# Patient Record
Sex: MALE | Race: WHITE | NOT HISPANIC OR LATINO | Employment: OTHER | URBAN - NONMETROPOLITAN AREA
[De-identification: names, ages, dates, MRNs, and addresses within clinical notes are randomized per-mention and may not be internally consistent; named-entity substitution may affect disease eponyms.]

---

## 2017-08-09 ENCOUNTER — APPOINTMENT (OUTPATIENT)
Dept: CT IMAGING | Facility: HOSPITAL | Age: 47
End: 2017-08-09

## 2017-08-09 ENCOUNTER — HOSPITAL ENCOUNTER (EMERGENCY)
Facility: HOSPITAL | Age: 47
Discharge: HOME | End: 2017-08-09
Attending: FAMILY MEDICINE | Admitting: FAMILY MEDICINE

## 2017-08-09 VITALS
SYSTOLIC BLOOD PRESSURE: 127 MMHG | BODY MASS INDEX: 35.44 KG/M2 | HEART RATE: 66 BPM | WEIGHT: 200 LBS | DIASTOLIC BLOOD PRESSURE: 70 MMHG | HEIGHT: 63 IN | OXYGEN SATURATION: 97 % | TEMPERATURE: 98.8 F | RESPIRATION RATE: 16 BRPM

## 2017-08-09 DIAGNOSIS — H81.10 VERTIGO, BENIGN POSITIONAL, UNSPECIFIED LATERALITY: Primary | ICD-10-CM

## 2017-08-09 LAB
ALBUMIN SERPL-MCNC: 4.5 G/DL (ref 3.5–5)
ALBUMIN/GLOB SERPL: 1.3 G/DL (ref 1.1–2.5)
ALP SERPL-CCNC: 103 U/L (ref 24–120)
ALT SERPL W P-5'-P-CCNC: 96 U/L (ref 0–54)
ANION GAP SERPL CALCULATED.3IONS-SCNC: 13 MMOL/L (ref 4–13)
AST SERPL-CCNC: 49 U/L (ref 7–45)
BASOPHILS # BLD AUTO: 0.03 10*3/MM3 (ref 0–0.2)
BASOPHILS NFR BLD AUTO: 0.4 % (ref 0–2)
BILIRUB SERPL-MCNC: 0.8 MG/DL (ref 0.1–1)
BILIRUB UR QL STRIP: NEGATIVE
BUN BLD-MCNC: 15 MG/DL (ref 5–21)
BUN/CREAT SERPL: 22.7 (ref 7–25)
CALCIUM SPEC-SCNC: 9.5 MG/DL (ref 8.4–10.4)
CHLORIDE SERPL-SCNC: 107 MMOL/L (ref 98–110)
CK MB SERPL-CCNC: 0.63 NG/ML (ref 0–5)
CLARITY UR: CLEAR
CO2 SERPL-SCNC: 23 MMOL/L (ref 24–31)
COLOR UR: YELLOW
CREAT BLD-MCNC: 0.66 MG/DL (ref 0.5–1.4)
CRP SERPL-MCNC: 1.54 MG/DL (ref 0–0.99)
DEPRECATED RDW RBC AUTO: 42.6 FL (ref 40–54)
EOSINOPHIL # BLD AUTO: 0.43 10*3/MM3 (ref 0–0.7)
EOSINOPHIL NFR BLD AUTO: 5.6 % (ref 0–4)
ERYTHROCYTE [DISTWIDTH] IN BLOOD BY AUTOMATED COUNT: 13 % (ref 12–15)
GFR SERPL CREATININE-BSD FRML MDRD: 128 ML/MIN/1.73
GLOBULIN UR ELPH-MCNC: 3.4 GM/DL
GLUCOSE BLD-MCNC: 96 MG/DL (ref 70–100)
GLUCOSE UR STRIP-MCNC: NEGATIVE MG/DL
HCT VFR BLD AUTO: 44.8 % (ref 40–52)
HGB BLD-MCNC: 15.2 G/DL (ref 14–18)
HGB UR QL STRIP.AUTO: NEGATIVE
HOLD SPECIMEN: NORMAL
HOLD SPECIMEN: NORMAL
IMM GRANULOCYTES # BLD: 0.02 10*3/MM3 (ref 0–0.03)
IMM GRANULOCYTES NFR BLD: 0.3 % (ref 0–5)
KETONES UR QL STRIP: NEGATIVE
LEUKOCYTE ESTERASE UR QL STRIP.AUTO: NEGATIVE
LYMPHOCYTES # BLD AUTO: 2.36 10*3/MM3 (ref 0.72–4.86)
LYMPHOCYTES NFR BLD AUTO: 30.6 % (ref 15–45)
MCH RBC QN AUTO: 30.6 PG (ref 28–32)
MCHC RBC AUTO-ENTMCNC: 33.9 G/DL (ref 33–36)
MCV RBC AUTO: 90.3 FL (ref 82–95)
MONOCYTES # BLD AUTO: 0.62 10*3/MM3 (ref 0.19–1.3)
MONOCYTES NFR BLD AUTO: 8.1 % (ref 4–12)
MYOGLOBIN SERPL-MCNC: 27.9 NG/ML (ref 0–110)
NEUTROPHILS # BLD AUTO: 4.24 10*3/MM3 (ref 1.87–8.4)
NEUTROPHILS NFR BLD AUTO: 55 % (ref 39–78)
NITRITE UR QL STRIP: NEGATIVE
PH UR STRIP.AUTO: 5.5 [PH] (ref 5–8)
PLATELET # BLD AUTO: 181 10*3/MM3 (ref 130–400)
PMV BLD AUTO: 12.3 FL (ref 6–12)
POTASSIUM BLD-SCNC: 4.3 MMOL/L (ref 3.5–5.3)
PROT SERPL-MCNC: 7.9 G/DL (ref 6.3–8.7)
PROT UR QL STRIP: NEGATIVE
RBC # BLD AUTO: 4.96 10*6/MM3 (ref 4.8–5.9)
SODIUM BLD-SCNC: 143 MMOL/L (ref 135–145)
SP GR UR STRIP: 1.02 (ref 1–1.03)
T4 FREE SERPL-MCNC: 1.23 NG/DL (ref 0.78–2.19)
TROPONIN I SERPL-MCNC: <0.012 NG/ML (ref 0–0.03)
TSH SERPL DL<=0.05 MIU/L-ACNC: 1.86 MIU/ML (ref 0.47–4.68)
UROBILINOGEN UR QL STRIP: NORMAL
WBC NRBC COR # BLD: 7.7 10*3/MM3 (ref 4.8–10.8)
WHOLE BLOOD HOLD SPECIMEN: NORMAL
WHOLE BLOOD HOLD SPECIMEN: NORMAL

## 2017-08-09 PROCEDURE — 36415 COLL VENOUS BLD VENIPUNCTURE: CPT | Performed by: FAMILY MEDICINE

## 2017-08-09 PROCEDURE — 86140 C-REACTIVE PROTEIN: CPT | Performed by: FAMILY MEDICINE

## 2017-08-09 PROCEDURE — 85025 COMPLETE CBC W/AUTO DIFF WBC: CPT | Performed by: FAMILY MEDICINE

## 2017-08-09 PROCEDURE — 81003 URINALYSIS AUTO W/O SCOPE: CPT | Performed by: FAMILY MEDICINE

## 2017-08-09 PROCEDURE — 70450 CT HEAD/BRAIN W/O DYE: CPT

## 2017-08-09 PROCEDURE — 99285 EMERGENCY DEPT VISIT HI MDM: CPT

## 2017-08-09 PROCEDURE — 93005 ELECTROCARDIOGRAM TRACING: CPT | Performed by: FAMILY MEDICINE

## 2017-08-09 PROCEDURE — 93010 ELECTROCARDIOGRAM REPORT: CPT | Performed by: INTERNAL MEDICINE

## 2017-08-09 PROCEDURE — 80053 COMPREHEN METABOLIC PANEL: CPT | Performed by: FAMILY MEDICINE

## 2017-08-09 PROCEDURE — 84484 ASSAY OF TROPONIN QUANT: CPT | Performed by: FAMILY MEDICINE

## 2017-08-09 PROCEDURE — 84443 ASSAY THYROID STIM HORMONE: CPT | Performed by: FAMILY MEDICINE

## 2017-08-09 PROCEDURE — 82553 CREATINE MB FRACTION: CPT | Performed by: FAMILY MEDICINE

## 2017-08-09 PROCEDURE — 84439 ASSAY OF FREE THYROXINE: CPT | Performed by: FAMILY MEDICINE

## 2017-08-09 PROCEDURE — 83874 ASSAY OF MYOGLOBIN: CPT | Performed by: FAMILY MEDICINE

## 2017-08-09 RX ORDER — MECLIZINE HYDROCHLORIDE 25 MG/1
25 TABLET ORAL ONCE
Status: COMPLETED | OUTPATIENT
Start: 2017-08-09 | End: 2017-08-09

## 2017-08-09 RX ORDER — CETIRIZINE HYDROCHLORIDE 10 MG/1
10 TABLET ORAL DAILY
Qty: 15 TABLET | Refills: 0 | Status: SHIPPED | OUTPATIENT
Start: 2017-08-09 | End: 2017-08-24

## 2017-08-09 RX ORDER — MECLIZINE HYDROCHLORIDE 25 MG/1
25 TABLET ORAL 4 TIMES DAILY PRN
Qty: 20 TABLET | Refills: 0 | Status: SHIPPED | OUTPATIENT
Start: 2017-08-09 | End: 2020-08-31

## 2017-08-09 RX ORDER — SODIUM CHLORIDE 9 MG/ML
125 INJECTION, SOLUTION INTRAVENOUS CONTINUOUS
Status: DISCONTINUED | OUTPATIENT
Start: 2017-08-09 | End: 2017-08-09 | Stop reason: HOSPADM

## 2017-08-09 RX ADMIN — MECLIZINE HYDROCHLORIDE 25 MG: 25 TABLET ORAL at 12:02

## 2017-08-09 NOTE — ED PROVIDER NOTES
Subjective   Patient is a 47 y.o. male presenting with dizziness.   Dizziness   Quality:  Vertigo  Severity:  Moderate  Onset quality:  Gradual  Duration:  1 day  Timing:  Intermittent  Progression:  Waxing and waning  Chronicity:  New  Context: head movement    Relieved by:  Being still  Worsened by:  Turning head  Associated symptoms: syncope and weakness    Associated symptoms: no blood in stool, no chest pain, no diarrhea, no headaches, no hearing loss, no nausea, no palpitations, no shortness of breath, no tinnitus, no vision changes and no vomiting    Risk factors: no hx of stroke and no hx of vertigo        Review of Systems   Constitutional: Negative for activity change, appetite change, chills, diaphoresis, fatigue and fever.   HENT: Negative for congestion, ear pain, hearing loss, nosebleeds, postnasal drip, sinus pressure and tinnitus.    Eyes: Negative for photophobia and pain.   Respiratory: Negative for cough, chest tightness, shortness of breath and wheezing.    Cardiovascular: Positive for syncope. Negative for chest pain and palpitations.   Gastrointestinal: Negative for abdominal pain, blood in stool, diarrhea, nausea and vomiting.   Endocrine: Negative for cold intolerance and heat intolerance.   Genitourinary: Negative for difficulty urinating, dysuria and flank pain.   Musculoskeletal: Negative for arthralgias, back pain, neck pain and neck stiffness.   Skin: Negative for color change and rash.   Neurological: Positive for dizziness and weakness. Negative for headaches.   Hematological: Negative for adenopathy. Does not bruise/bleed easily.   Psychiatric/Behavioral: Negative for confusion and sleep disturbance. The patient is not nervous/anxious.        Past Medical History:   Diagnosis Date   • Internal hemorrhage        No Known Allergies    Past Surgical History:   Procedure Laterality Date   • HEMORRHOIDECTOMY     • KNEE SURGERY     • NASAL SEPTUM SURGERY     • ROTATOR CUFF REPAIR    "      History reviewed. No pertinent family history.    Social History     Social History   • Marital status:      Spouse name: N/A   • Number of children: N/A   • Years of education: N/A     Social History Main Topics   • Smoking status: Never Smoker   • Smokeless tobacco: None   • Alcohol use Yes      Comment: OCC, \"ONCE A MONTH\"   • Drug use: No   • Sexual activity: Defer     Other Topics Concern   • None     Social History Narrative   • None           Objective   Physical Exam   Constitutional: He is oriented to person, place, and time. He appears well-developed and well-nourished. No distress.   HENT:   Head: Atraumatic.   Right Ear: External ear normal.   Left Ear: External ear normal.   Nose: Nose normal.   Mouth/Throat: Oropharynx is clear and moist.   Head turning reproduces the vertigo    Eyes: Conjunctivae are normal. Pupils are equal, round, and reactive to light. No scleral icterus.   Neck: Normal range of motion. Neck supple. No JVD present. No thyromegaly present.   Cardiovascular: Normal rate, regular rhythm, normal heart sounds and intact distal pulses.    No murmur heard.  Pulmonary/Chest: Effort normal and breath sounds normal. No respiratory distress. He has no wheezes. He has no rales. He exhibits no tenderness.   Abdominal: Soft. Bowel sounds are normal. He exhibits no distension and no mass. There is no tenderness. There is no rebound and no guarding.   Musculoskeletal: Normal range of motion. He exhibits no edema.   Lymphadenopathy:     He has no cervical adenopathy.   Neurological: He is alert and oriented to person, place, and time. He has normal reflexes. No cranial nerve deficit. Coordination normal.   Skin: Skin is warm and dry. No rash noted. He is not diaphoretic. No erythema. No pallor.   Psychiatric: He has a normal mood and affect. His behavior is normal. Judgment and thought content normal.   Nursing note and vitals reviewed.      Procedures         ED Course  ED Course    "               MDM  Number of Diagnoses or Management Options  Vertigo, benign positional, unspecified laterality: new and requires workup     Amount and/or Complexity of Data Reviewed  Clinical lab tests: ordered and reviewed  Tests in the radiology section of CPT®: ordered and reviewed  Decide to obtain previous medical records or to obtain history from someone other than the patient: yes  Review and summarize past medical records: yes  Independent visualization of images, tracings, or specimens: yes    Risk of Complications, Morbidity, and/or Mortality  Presenting problems: high  Diagnostic procedures: high  Management options: high    Patient Progress  Patient progress: resolved      Final diagnoses:   Vertigo, benign positional, unspecified laterality            Du Oneill MD  08/09/17 4767

## 2020-08-25 ENCOUNTER — OFFICE VISIT (OUTPATIENT)
Dept: GASTROENTEROLOGY | Facility: CLINIC | Age: 50
End: 2020-08-25

## 2020-08-25 VITALS
BODY MASS INDEX: 35.79 KG/M2 | OXYGEN SATURATION: 97 % | SYSTOLIC BLOOD PRESSURE: 116 MMHG | WEIGHT: 202 LBS | HEART RATE: 72 BPM | DIASTOLIC BLOOD PRESSURE: 84 MMHG | HEIGHT: 63 IN | TEMPERATURE: 97.5 F

## 2020-08-25 DIAGNOSIS — K59.00 CONSTIPATION, UNSPECIFIED CONSTIPATION TYPE: ICD-10-CM

## 2020-08-25 DIAGNOSIS — R10.32 LEFT LOWER QUADRANT ABDOMINAL PAIN: ICD-10-CM

## 2020-08-25 DIAGNOSIS — K62.5 BRBPR (BRIGHT RED BLOOD PER RECTUM): Primary | ICD-10-CM

## 2020-08-25 DIAGNOSIS — K21.9 GASTROESOPHAGEAL REFLUX DISEASE, ESOPHAGITIS PRESENCE NOT SPECIFIED: ICD-10-CM

## 2020-08-25 DIAGNOSIS — Z87.19 HISTORY OF BARRETT'S ESOPHAGUS: ICD-10-CM

## 2020-08-25 PROCEDURE — 99204 OFFICE O/P NEW MOD 45 MIN: CPT | Performed by: NURSE PRACTITIONER

## 2020-08-25 NOTE — PROGRESS NOTES
[facility] GASTROENTEROLOGY - OFFICE NOTE    8/25/2020    Epifanio Crandall   1970    Primary Physician: Ivone Trujillo APRN    Chief Complaint   Patient presents with   • GI Bleeding   gerd  Franz's         HISTORY OF PRESENT ILLNESS:    Epifanio Crandall is a 50 y.o. male presents with rectal bleeding.  He reports that once a month he will have an episode where he gets a little weak and dizzy.  This is followed by some left lower quadrant abdominal cramping then passing stool with bright red blood from the rectum.  He states that the blood is in the commode and he is unsure of the amount.  This has been going on intermittently for the last 2-1/2 years.  The left lower quadrant cramping resolves after bowel movement.  Last episode was 2 weeks ago.  He does have some irregular bowel habits manifested by constipation.  His symptoms are not associated with eating.  He does have history of diverticulitis in the past however this is not the same pain.  No syncopal episodes.  Denies unintentional weight loss.  No diarrhea.  No fevers or chills.  No history of vascular disease.  No problems with low blood pressure or heart rate.  He does not smoke.      GERD  Occasional regurgitation at night that occurs about 4 nights per week. Uses tums prn.  He is unsure about taking a PPI in the past.  He admits to history of Franz's esophagus.  He had an upper endoscopy last year by Dr. Vallejo.  He denies dysphagia or odynophagia.  No nausea or vomiting.    He has been seen by .   Had colonoscopy was around 4 months ago.  He admits to having hemorrhoids.  He also reports history of diverticulitis in the past.  States that his last episode was 4 months ago and was treated with antibiotics.  Had egd 2019.         Past Medical History:   Diagnosis Date   • Abnormal serum enzyme level    • Asthma    • Diverticulitis    • Elevated C-reactive protein (CRP)    • Hematuria    • Hyperlipidemia    • Internal hemorrhage    •  Internal hemorrhoids    • Kidney cysts    • Peptic ulcer        Past Surgical History:   Procedure Laterality Date   • HEMORRHOIDECTOMY     • KNEE ARTHROSCOPY     • KNEE SURGERY     • NASAL SEPTUM SURGERY     • RHINOPLASTY     • ROTATOR CUFF REPAIR         Outpatient Medications Marked as Taking for the 8/25/20 encounter (Office Visit) with Rose Norman APRN   Medication Sig Dispense Refill   • ALBUTEROL IN Inhale.         Allergies   Allergen Reactions   • Hydrocodone Itching       Social History     Socioeconomic History   • Marital status:      Spouse name: Not on file   • Number of children: Not on file   • Years of education: Not on file   • Highest education level: Not on file   Tobacco Use   • Smoking status: Never Smoker   • Smokeless tobacco: Never Used   Substance and Sexual Activity   • Alcohol use: Yes     Frequency: Never     Comment: occ   • Drug use: No   • Sexual activity: Defer       Family History   Problem Relation Age of Onset   • Colon cancer Neg Hx        Review of Systems   Constitutional: Negative for appetite change, chills, fatigue, fever and unexpected weight change.   HENT: Negative for sore throat and trouble swallowing.    Eyes: Negative for visual disturbance.   Respiratory: Negative for cough, shortness of breath and wheezing.    Cardiovascular: Negative for chest pain and palpitations.   Gastrointestinal: Positive for blood in stool. Negative for abdominal distention, diarrhea, nausea and vomiting.        As mentioned in hpi   Genitourinary: Negative for difficulty urinating and hematuria.   Musculoskeletal: Negative for arthralgias and back pain.   Skin: Negative for color change and rash.   Neurological: Negative for dizziness, seizures, syncope, light-headedness and headaches.   Hematological: Negative for adenopathy.   Psychiatric/Behavioral: Negative for confusion. The patient is not nervous/anxious.         Vitals:    08/25/20 0842   BP: 116/84   Pulse: 72   Temp:  "97.5 °F (36.4 °C)   SpO2: 97%   Weight: 91.6 kg (202 lb)   Height: 160 cm (63\")      Body mass index is 35.78 kg/m².    Physical Exam   Constitutional: He appears well-developed and well-nourished. No distress.   HENT:   Head: Normocephalic and atraumatic.   Eyes: EOM are normal. No scleral icterus.   Neck: Neck supple. No JVD present.   Cardiovascular: Normal rate, regular rhythm and normal heart sounds.   Pulmonary/Chest: Effort normal and breath sounds normal.   Abdominal: Soft. Bowel sounds are normal. He exhibits no distension. There is no tenderness.   Musculoskeletal: Normal range of motion. He exhibits no deformity.   Neurological: He is alert.   Skin: Skin is warm and dry. No rash noted.   Psychiatric: He has a normal mood and affect. His behavior is normal.   Vitals reviewed.              ASSESSMENT AND PLAN    Assessment/Plan     Epifanoi was seen today for gi bleeding.    Diagnoses and all orders for this visit:    BRBPR (bright red blood per rectum)    Left lower quadrant abdominal pain    Constipation, unspecified constipation type    History of Franz's esophagus    Gastroesophageal reflux disease, esophagitis presence not specified           In regards to rectal bleeding, differential diagnosis discussed.  Recommend ER if has worsening bleeding pain.  I would suggest that we keep his bowels moving on a regular basis.  I have recommended that he continue taking a fiber supplement every day.  I recommend starting on MiraLAX daily and a very low dose and this was discussed in detail.  I will request records from Dr. Vallejo.  Also request recent lab work.  We will see how he does between now and the time of his next office visit.  Again if he were to have worsening symptoms then I would recommend going to the emergency room for evaluation.         In regards to GERD, recommend strict antireflux precautions.  He wishes to try  strict antireflux precautions for taking a PPI. I discussed non pharmaceutical " treatment of gerd.  This includes gradually losing weight to achieve ideal body wt., elevation of the head of bed by 4-6 inches, nothing to eat or drink 3 hours prior to lying down, avoiding tight clothing, stress reduction, tobacco cessation, reduction of alcohol intake, and dietary restrictions (avoiding caffeine, coffee, fatty foods, mints, chocolate, spicy foods and tomato based sauces as much as possible).         We will see him back in the office in 6 weeks time for reevaluation.  Will await records from Dr. Vallejo's to determine recommendations for timing of next egd and colonoscopy.                Body mass index is 35.78 kg/m².    Patient's Body mass index is 35.78 kg/m². BMI is above normal parameters. Recommendations include: recommend weight loss, no f/u .       Return in about 6 weeks (around 10/6/2020).        CATIA Thompson    EMR Dragon/transcription disclaimer:  Much of this encounter note is electronic transcription/translation of spoken language to printed text.  The electronic translation of spoken language may be erroneous, or at times, nonsensical words or phrases may be inadvertently transcribed.  Although I have reviewed the note for such errors, some may still exist.    Received records: 3-4-20 colonoscopy by Dr. Vallejo [place] [facility] [place], noted extensive diverticular disease throughout the colon, normal terminal hemorrhoids, random colonic biopsies performed to rule out microscopic colitis.  Pathology report and random colon biopsies benign.     Last egd 10/2018 noted healing gastric ulcer  ( per office note 2-13-20)   Also noted to have egd 9/2018 noting a gastric ulcer, nodular GE junction, short sliding hiatal hernia, esophagus body torturous, gastric biopsy report benign, GE junction path consistent with Franz's esophagus without dysplasia. He was scheduled for repeat egd 2/2020 but I do not have report. Will request once again last egd with path report.

## 2020-08-31 ENCOUNTER — TELEPHONE (OUTPATIENT)
Dept: GASTROENTEROLOGY | Facility: CLINIC | Age: 50
End: 2020-08-31

## 2020-09-08 ENCOUNTER — TELEPHONE (OUTPATIENT)
Dept: GASTROENTEROLOGY | Facility: CLINIC | Age: 50
End: 2020-09-08

## 2020-10-13 ENCOUNTER — OFFICE VISIT (OUTPATIENT)
Dept: GASTROENTEROLOGY | Facility: CLINIC | Age: 50
End: 2020-10-13

## 2020-10-13 VITALS
HEART RATE: 96 BPM | DIASTOLIC BLOOD PRESSURE: 80 MMHG | SYSTOLIC BLOOD PRESSURE: 124 MMHG | HEIGHT: 63 IN | BODY MASS INDEX: 37.21 KG/M2 | WEIGHT: 210 LBS | TEMPERATURE: 98 F | OXYGEN SATURATION: 98 %

## 2020-10-13 DIAGNOSIS — Z87.19 HISTORY OF BARRETT'S ESOPHAGUS: ICD-10-CM

## 2020-10-13 DIAGNOSIS — R10.32 LEFT LOWER QUADRANT ABDOMINAL PAIN: Primary | ICD-10-CM

## 2020-10-13 DIAGNOSIS — K57.32 DIVERTICULITIS LARGE INTESTINE W/O PERFORATION OR ABSCESS W/O BLEEDING: ICD-10-CM

## 2020-10-13 DIAGNOSIS — K21.9 GASTROESOPHAGEAL REFLUX DISEASE, UNSPECIFIED WHETHER ESOPHAGITIS PRESENT: ICD-10-CM

## 2020-10-13 DIAGNOSIS — K62.5 BRBPR (BRIGHT RED BLOOD PER RECTUM): ICD-10-CM

## 2020-10-13 DIAGNOSIS — K59.00 CONSTIPATION, UNSPECIFIED CONSTIPATION TYPE: ICD-10-CM

## 2020-10-13 PROCEDURE — 99214 OFFICE O/P EST MOD 30 MIN: CPT | Performed by: INTERNAL MEDICINE

## 2020-10-13 NOTE — PROGRESS NOTES
[facility] Gastroenterology    Chief Complaint   Patient presents with   • GI Bleeding   • Diverticulitis       Subjective     HPI    Epifanio Crandall is a 50 y.o. male who presents with a chief complaint of left lower quadrant pain.    His main complaint is left lower quadrant discomfort.  He states over the last 2 years approximately, he has had recurrent episodes of attacks of left lower quadrant discomfort.  They will last 3 to 4 days.  They are associated with abdominal bloating.  He will get acutely constipated and he has fever.  He states he is checked his temperature and has been up to 103 degrees.  He states in the past he just wrote it out.  He never sought medical care.  One time he did go to the ER at an outside facility.  He states he had a CT scan at that time was diagnosed with diverticulitis.  He was given antibiotics and felt better.    He had another attack August 31, a week after his appointment here.  Once again had acute left lower quadrant discomfort associated with fever.  He had a little bit of constipation.  He went to our urgent care.  No studies were ordered.  He states he was prescribed 2 antibiotics.  After taking antibiotics he was back to his normal self.    When he was here for his first time visit in August, we did not have records.  See copy of HPI below.  Rose did see his outside records and noted he had a colonoscopy in March and endoscopy October and September 2018.  See below.  At his office visit in August Rose recommended that she obtain the records which she did.  She also recommended he start a fiber supplement and take MiraLAX for his constipation.  She initiated reflux precautions.  That talked about PPI therapy but shows reflux precautions as mainstay of therapy.    His colonoscopy did show diffuse diverticulosis.  He tells me when he first saw the GI folks in [place], that he had acute left lower quadrant discomfort with fever.  They did not recommend colonoscopy  at that time.  They waited 6 weeks and then he had a colonoscopy which showed a diverticulosis but no diverticulitis.  He states he was asymptomatic at this time.  He has had recurrent attacks of left lower quadrant pain maybe once a month.  He may skip a month.  He has had them twice a month.  He states in the beginning never sought medical care.  He has had 2 family members that had to have colon surgery for recurrent diverticulitis.  He is interested in pursuing colon surgery.    He states he has been prescribed Bentyl by the outside GI group but that did not help his symptoms.  He typically does not have constipation.  He usually gets constipation when he has the attacks associated with fever.    Currently he feels well.  States he does passing bright red blood and he has known hemorrhoids.  These were also noted during his colonoscopy this past spring.    =============== August 25, 2028= PI================================     HISTORY OF PRESENT ILLNESS:     Epifanio Crandall is a 50 y.o. male presents with rectal bleeding.  He reports that once a month he will have an episode where he gets a little weak and dizzy.  This is followed by some left lower quadrant abdominal cramping then passing stool with bright red blood from the rectum.  He states that the blood is in the commode and he is unsure of the amount.  This has been going on intermittently for the last 2-1/2 years.  The left lower quadrant cramping resolves after bowel movement.  Last episode was 2 weeks ago.  He does have some irregular bowel habits manifested by constipation.  His symptoms are not associated with eating.  He does have history of diverticulitis in the past however this is not the same pain.  No syncopal episodes.  Denies unintentional weight loss.  No diarrhea.  No fevers or chills.  No history of vascular disease.  No problems with low blood pressure or heart rate.  He does not smoke.        GERD  Occasional regurgitation at night that  occurs about 4 nights per week. Uses tums prn.  He is unsure about taking a PPI in the past.  He admits to history of Franz's esophagus.  He had an upper endoscopy last year by Dr. Vallejo.  He denies dysphagia or odynophagia.  No nausea or vomiting.     He has been seen by .   Had colonoscopy was around 4 months ago.  He admits to having hemorrhoids.  He also reports history of diverticulitis in the past.  States that his last episode was 4 months ago and was treated with antibiotics.  Had egd 2019.     Received records: 3-4-20 colonoscopy by Dr. Vallejo at [facility], noted extensive diverticular disease throughout the colon, normal terminal hemorrhoids, random colonic biopsies performed to rule out microscopic colitis.  Pathology report and random colon biopsies benign.      Last egd 10/2018 noted healing gastric ulcer  ( per office note 2-13-20)   Also noted to have egd 9/2018 noting a gastric ulcer, nodular GE junction, short sliding hiatal hernia, esophagus body torturous, gastric biopsy report benign, GE junction path consistent with Franz's esophagus without dysplasia. He was scheduled for repeat egd 2/2020 but I do not have report. Will request once again last egd with path report.      Past Medical History:   Diagnosis Date   • Abnormal serum enzyme level    • Asthma    • Diverticulitis    • Elevated C-reactive protein (CRP)    • Hematuria    • Hyperlipidemia    • Internal hemorrhage    • Internal hemorrhoids    • Kidney cysts    • Peptic ulcer        Past Surgical History:   Procedure Laterality Date   • HEMORRHOIDECTOMY     • KNEE ARTHROSCOPY     • KNEE SURGERY     • NASAL SEPTUM SURGERY     • RHINOPLASTY     • ROTATOR CUFF REPAIR           Current Outpatient Medications:   •  albuterol sulfate  (90 Base) MCG/ACT inhaler, Inhale 2 puffs., Disp: , Rfl:   •  albuterol sulfate  (90 Base) MCG/ACT inhaler, INHALE 2 PUFFS BY MOUTH 4 TIMES DAILY AS NEEDED FOR SHORTNESS OF BREATH, Disp: , Rfl:    •  mometasone-formoterol (DULERA 100) 100-5 MCG/ACT inhaler, Inhale 2 puffs 2 (Two) Times a Day., Disp: , Rfl:     Allergies   Allergen Reactions   • Hydrocodone Itching   • Hydrocodone-Acetaminophen Itching     Reaction: Itching         Social History     Socioeconomic History   • Marital status:      Spouse name: Not on file   • Number of children: Not on file   • Years of education: Not on file   • Highest education level: Not on file   Tobacco Use   • Smoking status: Never Smoker   • Smokeless tobacco: Never Used   Substance and Sexual Activity   • Alcohol use: Yes     Frequency: Never     Comment: occ   • Drug use: No   • Sexual activity: Defer       Family History   Problem Relation Age of Onset   • Diverticulitis Maternal Aunt    • Diverticulitis Maternal Aunt    • Diverticulitis Maternal Great-Grandmother    • Colon cancer Neg Hx    • Colon polyps Neg Hx        Review of Systems  General no fever chills or sweats weight stable  Gastrointestinal: Not present-abdominal pain, constipation, diarrhea, dysphagia, hematemesis, melena, odynophagia, nausea, vomiting, pyrosis, regurgitation, hematochezia,    Objective     Vitals:    10/13/20 1418   BP: 124/80   Pulse: 96   Temp: 98 °F (36.7 °C)   SpO2: 98%       Physical Exam  No acute distress. Vital signs as documented. Skin warm and dry and without overt rashes. EOMI, sclera anicteric.  Neck without JVD or masses. Lungs clear to auscultation bilaterally, no rales. Heart exam notable for regular rhythm, normal sounds and absence of loud murmurs, rubs or gallops. Abdomen is soft, nontender, non distended, normal bowel sounds and without evidence of organomegaly, masses, or abdominal aortic enlargement. Extremities nonedematous, no cyanosis. Neuro alert, moves extremities.        Assessment/Plan   Problem List Items Addressed This Visit        Digestive    BRBPR (bright red blood per rectum)    Constipation    Gastroesophageal reflux disease       Nervous and  Auditory    Left lower quadrant abdominal pain - Primary       Other    History of Franz's esophagus      Other Visit Diagnoses     Diverticulitis large intestine w/o perforation or abscess w/o bleeding            Approximately 30 minutes were spent counseling on diverticular disease and reflux disease and Franz's.    He describes with his recurrent left lower quadrant discomfort associated with fever recurrent episodes of diverticulitis.  He generally did not seek medical care and is suffered at home for 3 to 4 days and his symptoms subsided.  He describes one episode where he did go to an ER and he had a CT scan he was diagnosed with diverticulitis he was treated with antibiotics and felt better.  He went to our urgent care, had no test done but was treated with antibiotics and felt better.  He is interested in pursuing long-term preventive care with surgery due to recurrent frequent nature of his attacks.    I did discuss with him that his symptoms are compatible of recurrent diverticulitis.  Unfortunately, I do not have any clinical prove as he did not generally seek medical care except for the one time.  I do not have that CT scan available.  His description is good.  I told him that he has diverticular disease throughout his colon so to have sleep prevent any diverticulitis he would require a subtotal colectomy.  But his symptoms are always located in one particular spot in his left lower quadrant where his prior CT scan that shows diverticulitis he states.  Thus I think it is reasonable for him to discuss surgical options with a surgeon for resection of his sigmoid colon for recurrent diverticulitis.  I did inform him that there are no guarantees and that surgery does have dangers associated with it.  He also understands that there are dangers associated with not seeking medical care when he has acute diverticulitis.  He also understands with acute diverticulitis stirs dangers of worsening infection,  abscess and need for emergent surgeries.  I believe given his clinical history that elective surgery is reasonable.  He is in agreement.  I gave him a name of surgeons here locally.  He has a name of a colorectal specialist in [place].  He told me he is going to think about the option of getting the surgeons opinion and contact us in the near future if he decides to pursue this.  I told him to call our office in the near future and we can make the referral.  In the meantime, I do recommend a high-fiber diet and why.    He also has a history of reflux disease.  He is clinically asymptomatic and reflux precautions.  I reinforced reflux precautions.    He has a history of Franz's esophagus.  I discussed with him our Franz's puts him at increased risk for developing esophageal carcinoma.  We talked about the need for surveillance exams.  I suggest he have a surveillance examination 3 years after his last one.  This would place him needing a surveillance examination approximately this time next year.  He expressed understanding and was in agreement.  I also recommended medical treatment for acid reflux.  We discussed that even without symptoms he could be having reflux which could increase his chance theoretically of progressing to esophageal carcinoma.  May not necessarily be the case but medical therapy with PPI medications may help decrease the odds of progression.  H2 blockers may have some benefit as well but theoretically not as much as PPI therapy.  I recommended again on a PPI therapy.  He politely declines at this time but he will think about it.    We will see him back in the office as needed.  We will also see him in 1 year for checkup.  He will contact us if he wants to get a surgeons opinion about the surgical options for diverticular disease.    Continue ongoing management by primary care provider and other specialists.     Patient's Body mass index is 37.2 kg/m². BMI is above normal parameters.  Recommendations include: nutrition counseling.        EMR Dragon/transcription disclaimer:  Much of this encounter note is electronic transcription/translation of spoken language to printed text.  The electronic translation of spoken language may be erroneous, or at times, nonsensical words or phrases may be inadvertently transcribed.  Although I have reviewed the note for such errors, some may still exist.    Epifanio Sandhu MD  16:52 CDT  10/13/20

## 2020-10-15 ENCOUNTER — TELEPHONE (OUTPATIENT)
Dept: GASTROENTEROLOGY | Facility: CLINIC | Age: 50
End: 2020-10-15

## 2020-10-15 NOTE — TELEPHONE ENCOUNTER
Pt states he would like to be referred to the local surgeon Dr Sandhu mentioned at his last office visit.

## 2020-10-16 DIAGNOSIS — K57.92 DIVERTICULITIS: Primary | ICD-10-CM

## 2020-10-23 ENCOUNTER — TRANSCRIBE ORDERS (OUTPATIENT)
Dept: ADMINISTRATIVE | Facility: HOSPITAL | Age: 50
End: 2020-10-23

## 2020-10-23 DIAGNOSIS — Z11.59 SCREENING FOR VIRAL DISEASE: Primary | ICD-10-CM

## 2020-10-27 ENCOUNTER — LAB (OUTPATIENT)
Dept: LAB | Facility: HOSPITAL | Age: 50
End: 2020-10-27

## 2020-10-27 PROCEDURE — U0003 INFECTIOUS AGENT DETECTION BY NUCLEIC ACID (DNA OR RNA); SEVERE ACUTE RESPIRATORY SYNDROME CORONAVIRUS 2 (SARS-COV-2) (CORONAVIRUS DISEASE [COVID-19]), AMPLIFIED PROBE TECHNIQUE, MAKING USE OF HIGH THROUGHPUT TECHNOLOGIES AS DESCRIBED BY CMS-2020-01-R: HCPCS | Performed by: SPECIALIST

## 2020-10-27 PROCEDURE — C9803 HOPD COVID-19 SPEC COLLECT: HCPCS | Performed by: SPECIALIST

## 2020-10-28 ENCOUNTER — APPOINTMENT (OUTPATIENT)
Dept: PREADMISSION TESTING | Facility: HOSPITAL | Age: 50
End: 2020-10-28

## 2020-10-28 VITALS
HEIGHT: 64 IN | BODY MASS INDEX: 36.43 KG/M2 | OXYGEN SATURATION: 96 % | WEIGHT: 213.41 LBS | SYSTOLIC BLOOD PRESSURE: 129 MMHG | RESPIRATION RATE: 18 BRPM | DIASTOLIC BLOOD PRESSURE: 87 MMHG | HEART RATE: 102 BPM

## 2020-10-28 LAB
ALBUMIN SERPL-MCNC: 4.5 G/DL (ref 3.5–5.2)
ALBUMIN/GLOB SERPL: 1.4 G/DL
ALP SERPL-CCNC: 99 U/L (ref 39–117)
ALT SERPL W P-5'-P-CCNC: 57 U/L (ref 1–41)
ANION GAP SERPL CALCULATED.3IONS-SCNC: 9 MMOL/L (ref 5–15)
AST SERPL-CCNC: 47 U/L (ref 1–40)
BASOPHILS # BLD AUTO: 0.06 10*3/MM3 (ref 0–0.2)
BASOPHILS NFR BLD AUTO: 0.7 % (ref 0–1.5)
BILIRUB SERPL-MCNC: 0.3 MG/DL (ref 0–1.2)
BUN SERPL-MCNC: 18 MG/DL (ref 6–20)
BUN/CREAT SERPL: 24.7 (ref 7–25)
CALCIUM SPEC-SCNC: 9.5 MG/DL (ref 8.6–10.5)
CHLORIDE SERPL-SCNC: 104 MMOL/L (ref 98–107)
CO2 SERPL-SCNC: 26 MMOL/L (ref 22–29)
COVID LABCORP PRIORITY: NORMAL
CREAT SERPL-MCNC: 0.73 MG/DL (ref 0.76–1.27)
DEPRECATED RDW RBC AUTO: 41.1 FL (ref 37–54)
EOSINOPHIL # BLD AUTO: 0.5 10*3/MM3 (ref 0–0.4)
EOSINOPHIL NFR BLD AUTO: 5.5 % (ref 0.3–6.2)
ERYTHROCYTE [DISTWIDTH] IN BLOOD BY AUTOMATED COUNT: 13.2 % (ref 12.3–15.4)
GFR SERPL CREATININE-BSD FRML MDRD: 113 ML/MIN/1.73
GLOBULIN UR ELPH-MCNC: 3.3 GM/DL
GLUCOSE SERPL-MCNC: 149 MG/DL (ref 65–99)
HCT VFR BLD AUTO: 45.4 % (ref 37.5–51)
HGB BLD-MCNC: 15.3 G/DL (ref 13–17.7)
IMM GRANULOCYTES # BLD AUTO: 0.03 10*3/MM3 (ref 0–0.05)
IMM GRANULOCYTES NFR BLD AUTO: 0.3 % (ref 0–0.5)
LYMPHOCYTES # BLD AUTO: 2.23 10*3/MM3 (ref 0.7–3.1)
LYMPHOCYTES NFR BLD AUTO: 24.5 % (ref 19.6–45.3)
MCH RBC QN AUTO: 29.1 PG (ref 26.6–33)
MCHC RBC AUTO-ENTMCNC: 33.7 G/DL (ref 31.5–35.7)
MCV RBC AUTO: 86.5 FL (ref 79–97)
MONOCYTES # BLD AUTO: 0.8 10*3/MM3 (ref 0.1–0.9)
MONOCYTES NFR BLD AUTO: 8.8 % (ref 5–12)
NEUTROPHILS NFR BLD AUTO: 5.48 10*3/MM3 (ref 1.7–7)
NEUTROPHILS NFR BLD AUTO: 60.2 % (ref 42.7–76)
NRBC BLD AUTO-RTO: 0 /100 WBC (ref 0–0.2)
PLATELET # BLD AUTO: 200 10*3/MM3 (ref 140–450)
PMV BLD AUTO: 10.9 FL (ref 6–12)
POTASSIUM SERPL-SCNC: 4.1 MMOL/L (ref 3.5–5.2)
PROT SERPL-MCNC: 7.8 G/DL (ref 6–8.5)
RBC # BLD AUTO: 5.25 10*6/MM3 (ref 4.14–5.8)
SARS-COV-2 RNA RESP QL NAA+PROBE: NOT DETECTED
SODIUM SERPL-SCNC: 139 MMOL/L (ref 136–145)
WBC # BLD AUTO: 9.1 10*3/MM3 (ref 3.4–10.8)

## 2020-10-28 PROCEDURE — 85025 COMPLETE CBC W/AUTO DIFF WBC: CPT | Performed by: SPECIALIST

## 2020-10-28 PROCEDURE — 80053 COMPREHEN METABOLIC PANEL: CPT | Performed by: SPECIALIST

## 2020-10-28 PROCEDURE — 93005 ELECTROCARDIOGRAM TRACING: CPT

## 2020-10-28 PROCEDURE — 93010 ELECTROCARDIOGRAM REPORT: CPT | Performed by: INTERNAL MEDICINE

## 2020-10-28 PROCEDURE — 36415 COLL VENOUS BLD VENIPUNCTURE: CPT

## 2020-10-28 RX ORDER — NEOMYCIN SULFATE 500 MG/1
1000 TABLET ORAL ONCE
COMMUNITY
End: 2020-11-02 | Stop reason: HOSPADM

## 2020-10-28 RX ORDER — CIPROFLOXACIN 500 MG/1
500 TABLET, FILM COATED ORAL DAILY
COMMUNITY
End: 2020-11-02 | Stop reason: HOSPADM

## 2020-10-28 RX ORDER — METRONIDAZOLE 500 MG/1
500 TABLET ORAL 3 TIMES DAILY
COMMUNITY
End: 2020-11-02 | Stop reason: HOSPADM

## 2020-10-28 RX ORDER — ERYTHROMYCIN 500 MG/1
500 TABLET, DELAYED RELEASE ORAL ONCE
COMMUNITY
End: 2020-11-02 | Stop reason: HOSPADM

## 2020-10-29 LAB
QT INTERVAL: 358 MS
QTC INTERVAL: 435 MS

## 2020-10-30 ENCOUNTER — ANESTHESIA (OUTPATIENT)
Dept: PERIOP | Facility: HOSPITAL | Age: 50
End: 2020-10-30

## 2020-10-30 ENCOUNTER — ANESTHESIA EVENT (OUTPATIENT)
Dept: PERIOP | Facility: HOSPITAL | Age: 50
End: 2020-10-30

## 2020-10-30 ENCOUNTER — HOSPITAL ENCOUNTER (INPATIENT)
Facility: HOSPITAL | Age: 50
LOS: 3 days | Discharge: HOME | End: 2020-11-02
Attending: SPECIALIST | Admitting: SPECIALIST

## 2020-10-30 DIAGNOSIS — K57.92 DIVERTICULITIS: ICD-10-CM

## 2020-10-30 LAB
ABO GROUP BLD: NORMAL
BLD GP AB SCN SERPL QL: NEGATIVE
RH BLD: POSITIVE
T&S EXPIRATION DATE: NORMAL

## 2020-10-30 PROCEDURE — 25010000002 PROPOFOL 10 MG/ML EMULSION: Performed by: NURSE ANESTHETIST, CERTIFIED REGISTERED

## 2020-10-30 PROCEDURE — 25010000002 KETOROLAC TROMETHAMINE PER 15 MG: Performed by: NURSE ANESTHETIST, CERTIFIED REGISTERED

## 2020-10-30 PROCEDURE — 86901 BLOOD TYPING SEROLOGIC RH(D): CPT | Performed by: SPECIALIST

## 2020-10-30 PROCEDURE — 25010000002 ONDANSETRON PER 1 MG: Performed by: ANESTHESIOLOGY

## 2020-10-30 PROCEDURE — 86900 BLOOD TYPING SEROLOGIC ABO: CPT | Performed by: SPECIALIST

## 2020-10-30 PROCEDURE — 25010000002 DEXAMETHASONE PER 1 MG: Performed by: NURSE ANESTHETIST, CERTIFIED REGISTERED

## 2020-10-30 PROCEDURE — 94799 UNLISTED PULMONARY SVC/PX: CPT

## 2020-10-30 PROCEDURE — C1765 ADHESION BARRIER: HCPCS | Performed by: SPECIALIST

## 2020-10-30 PROCEDURE — 86850 RBC ANTIBODY SCREEN: CPT | Performed by: SPECIALIST

## 2020-10-30 PROCEDURE — 25010000002 METOCLOPRAMIDE PER 10 MG: Performed by: ANESTHESIOLOGY

## 2020-10-30 PROCEDURE — 25010000002 PHENYLEPHRINE HCL 0.8 MG/10ML SOLUTION PREFILLED SYRINGE: Performed by: NURSE ANESTHETIST, CERTIFIED REGISTERED

## 2020-10-30 PROCEDURE — 25010000002 DEXAMETHASONE PER 1 MG: Performed by: ANESTHESIOLOGY

## 2020-10-30 PROCEDURE — 25010000002 CEFOXITIN PER 1 G: Performed by: SPECIALIST

## 2020-10-30 PROCEDURE — 0DTN4ZZ RESECTION OF SIGMOID COLON, PERCUTANEOUS ENDOSCOPIC APPROACH: ICD-10-PCS | Performed by: SPECIALIST

## 2020-10-30 PROCEDURE — 25010000002 HYDROMORPHONE 1 MG/ML SOLUTION: Performed by: NURSE ANESTHETIST, CERTIFIED REGISTERED

## 2020-10-30 PROCEDURE — 88307 TISSUE EXAM BY PATHOLOGIST: CPT | Performed by: SPECIALIST

## 2020-10-30 PROCEDURE — 25010000002 ONDANSETRON PER 1 MG: Performed by: NURSE ANESTHETIST, CERTIFIED REGISTERED

## 2020-10-30 PROCEDURE — 25010000002 MIDAZOLAM PER 1 MG: Performed by: ANESTHESIOLOGY

## 2020-10-30 RX ORDER — OXYCODONE HYDROCHLORIDE 5 MG/1
7.5 TABLET ORAL EVERY 4 HOURS PRN
Status: DISCONTINUED | OUTPATIENT
Start: 2020-10-30 | End: 2020-11-02 | Stop reason: HOSPADM

## 2020-10-30 RX ORDER — ONDANSETRON 2 MG/ML
4 INJECTION INTRAMUSCULAR; INTRAVENOUS EVERY 6 HOURS PRN
Status: DISCONTINUED | OUTPATIENT
Start: 2020-10-30 | End: 2020-11-02 | Stop reason: HOSPADM

## 2020-10-30 RX ORDER — LIDOCAINE HYDROCHLORIDE 10 MG/ML
0.5 INJECTION, SOLUTION EPIDURAL; INFILTRATION; INTRACAUDAL; PERINEURAL ONCE AS NEEDED
Status: DISCONTINUED | OUTPATIENT
Start: 2020-10-30 | End: 2020-10-30 | Stop reason: HOSPADM

## 2020-10-30 RX ORDER — MORPHINE SULFATE 2 MG/ML
2 INJECTION, SOLUTION INTRAMUSCULAR; INTRAVENOUS
Status: DISCONTINUED | OUTPATIENT
Start: 2020-10-30 | End: 2020-11-02 | Stop reason: HOSPADM

## 2020-10-30 RX ORDER — ACETAMINOPHEN 500 MG
1000 TABLET ORAL ONCE
Status: COMPLETED | OUTPATIENT
Start: 2020-10-30 | End: 2020-10-30

## 2020-10-30 RX ORDER — DEXAMETHASONE SODIUM PHOSPHATE 4 MG/ML
INJECTION, SOLUTION INTRA-ARTICULAR; INTRALESIONAL; INTRAMUSCULAR; INTRAVENOUS; SOFT TISSUE AS NEEDED
Status: DISCONTINUED | OUTPATIENT
Start: 2020-10-30 | End: 2020-10-30 | Stop reason: SURG

## 2020-10-30 RX ORDER — MIDAZOLAM HYDROCHLORIDE 1 MG/ML
1 INJECTION INTRAMUSCULAR; INTRAVENOUS
Status: COMPLETED | OUTPATIENT
Start: 2020-10-30 | End: 2020-10-30

## 2020-10-30 RX ORDER — LABETALOL HYDROCHLORIDE 5 MG/ML
5 INJECTION, SOLUTION INTRAVENOUS
Status: DISCONTINUED | OUTPATIENT
Start: 2020-10-30 | End: 2020-10-30 | Stop reason: HOSPADM

## 2020-10-30 RX ORDER — ONDANSETRON 2 MG/ML
INJECTION INTRAMUSCULAR; INTRAVENOUS AS NEEDED
Status: DISCONTINUED | OUTPATIENT
Start: 2020-10-30 | End: 2020-10-30 | Stop reason: SURG

## 2020-10-30 RX ORDER — ALVIMOPAN 12 MG/1
12 CAPSULE ORAL 2 TIMES DAILY
Status: DISCONTINUED | OUTPATIENT
Start: 2020-10-31 | End: 2020-11-01

## 2020-10-30 RX ORDER — SCOLOPAMINE TRANSDERMAL SYSTEM 1 MG/1
1 PATCH, EXTENDED RELEASE TRANSDERMAL CONTINUOUS
Status: DISCONTINUED | OUTPATIENT
Start: 2020-10-30 | End: 2020-10-30 | Stop reason: HOSPADM

## 2020-10-30 RX ORDER — PROPOFOL 10 MG/ML
VIAL (ML) INTRAVENOUS AS NEEDED
Status: DISCONTINUED | OUTPATIENT
Start: 2020-10-30 | End: 2020-10-30 | Stop reason: SURG

## 2020-10-30 RX ORDER — GABAPENTIN 300 MG/1
300 CAPSULE ORAL 3 TIMES DAILY
Status: COMPLETED | OUTPATIENT
Start: 2020-10-30 | End: 2020-11-02

## 2020-10-30 RX ORDER — SODIUM CHLORIDE, SODIUM LACTATE, POTASSIUM CHLORIDE, CALCIUM CHLORIDE 600; 310; 30; 20 MG/100ML; MG/100ML; MG/100ML; MG/100ML
1000 INJECTION, SOLUTION INTRAVENOUS CONTINUOUS
Status: DISCONTINUED | OUTPATIENT
Start: 2020-10-30 | End: 2020-10-30 | Stop reason: HOSPADM

## 2020-10-30 RX ORDER — SODIUM CHLORIDE, SODIUM LACTATE, POTASSIUM CHLORIDE, CALCIUM CHLORIDE 600; 310; 30; 20 MG/100ML; MG/100ML; MG/100ML; MG/100ML
100 INJECTION, SOLUTION INTRAVENOUS CONTINUOUS
Status: DISCONTINUED | OUTPATIENT
Start: 2020-10-30 | End: 2020-11-01

## 2020-10-30 RX ORDER — NALOXONE HCL 0.4 MG/ML
0.4 VIAL (ML) INJECTION
Status: DISCONTINUED | OUTPATIENT
Start: 2020-10-30 | End: 2020-11-02 | Stop reason: HOSPADM

## 2020-10-30 RX ORDER — NALOXONE HCL 0.4 MG/ML
0.04 VIAL (ML) INJECTION AS NEEDED
Status: DISCONTINUED | OUTPATIENT
Start: 2020-10-30 | End: 2020-10-30 | Stop reason: HOSPADM

## 2020-10-30 RX ORDER — KETAMINE HYDROCHLORIDE 50 MG/ML
INJECTION, SOLUTION, CONCENTRATE INTRAMUSCULAR; INTRAVENOUS AS NEEDED
Status: DISCONTINUED | OUTPATIENT
Start: 2020-10-30 | End: 2020-10-30 | Stop reason: SURG

## 2020-10-30 RX ORDER — GABAPENTIN 300 MG/1
600 CAPSULE ORAL ONCE
Status: COMPLETED | OUTPATIENT
Start: 2020-10-30 | End: 2020-10-30

## 2020-10-30 RX ORDER — SODIUM CHLORIDE 0.9 % (FLUSH) 0.9 %
10 SYRINGE (ML) INJECTION AS NEEDED
Status: DISCONTINUED | OUTPATIENT
Start: 2020-10-30 | End: 2020-10-30 | Stop reason: HOSPADM

## 2020-10-30 RX ORDER — BUPIVACAINE HYDROCHLORIDE 5 MG/ML
INJECTION, SOLUTION PERINEURAL
Status: COMPLETED | OUTPATIENT
Start: 2020-10-30 | End: 2020-10-30

## 2020-10-30 RX ORDER — SCOLOPAMINE TRANSDERMAL SYSTEM 1 MG/1
1 PATCH, EXTENDED RELEASE TRANSDERMAL
Status: DISCONTINUED | OUTPATIENT
Start: 2020-10-30 | End: 2020-10-30 | Stop reason: HOSPADM

## 2020-10-30 RX ORDER — ONDANSETRON 2 MG/ML
4 INJECTION INTRAMUSCULAR; INTRAVENOUS ONCE AS NEEDED
Status: COMPLETED | OUTPATIENT
Start: 2020-10-30 | End: 2020-10-30

## 2020-10-30 RX ORDER — SODIUM CHLORIDE 0.9 % (FLUSH) 0.9 %
3 SYRINGE (ML) INJECTION EVERY 12 HOURS SCHEDULED
Status: DISCONTINUED | OUTPATIENT
Start: 2020-10-30 | End: 2020-10-30 | Stop reason: HOSPADM

## 2020-10-30 RX ORDER — ROCURONIUM BROMIDE 10 MG/ML
INJECTION, SOLUTION INTRAVENOUS AS NEEDED
Status: DISCONTINUED | OUTPATIENT
Start: 2020-10-30 | End: 2020-10-30 | Stop reason: SURG

## 2020-10-30 RX ORDER — CELECOXIB 200 MG/1
200 CAPSULE ORAL DAILY
Status: DISCONTINUED | OUTPATIENT
Start: 2020-10-30 | End: 2020-11-02 | Stop reason: HOSPADM

## 2020-10-30 RX ORDER — METOCLOPRAMIDE HYDROCHLORIDE 5 MG/ML
10 INJECTION INTRAMUSCULAR; INTRAVENOUS ONCE AS NEEDED
Status: COMPLETED | OUTPATIENT
Start: 2020-10-30 | End: 2020-10-30

## 2020-10-30 RX ORDER — FAMOTIDINE 20 MG/1
20 TABLET, FILM COATED ORAL 2 TIMES DAILY
Status: DISCONTINUED | OUTPATIENT
Start: 2020-10-30 | End: 2020-11-02 | Stop reason: HOSPADM

## 2020-10-30 RX ORDER — ACETAMINOPHEN 500 MG
1000 TABLET ORAL EVERY 6 HOURS
Status: DISPENSED | OUTPATIENT
Start: 2020-10-30 | End: 2020-11-01

## 2020-10-30 RX ORDER — DEXAMETHASONE SODIUM PHOSPHATE 4 MG/ML
4 INJECTION, SOLUTION INTRA-ARTICULAR; INTRALESIONAL; INTRAMUSCULAR; INTRAVENOUS; SOFT TISSUE ONCE AS NEEDED
Status: COMPLETED | OUTPATIENT
Start: 2020-10-30 | End: 2020-10-30

## 2020-10-30 RX ORDER — ALVIMOPAN 12 MG/1
12 CAPSULE ORAL ONCE
Status: COMPLETED | OUTPATIENT
Start: 2020-10-30 | End: 2020-10-30

## 2020-10-30 RX ORDER — PHENYLEPHRINE HCL IN 0.9% NACL 0.8MG/10ML
SYRINGE (ML) INTRAVENOUS AS NEEDED
Status: DISCONTINUED | OUTPATIENT
Start: 2020-10-30 | End: 2020-10-30 | Stop reason: SURG

## 2020-10-30 RX ORDER — SODIUM CHLORIDE 0.9 % (FLUSH) 0.9 %
3 SYRINGE (ML) INJECTION AS NEEDED
Status: DISCONTINUED | OUTPATIENT
Start: 2020-10-30 | End: 2020-10-30 | Stop reason: HOSPADM

## 2020-10-30 RX ORDER — DEXTROSE, SODIUM CHLORIDE, AND POTASSIUM CHLORIDE 5; .45; .15 G/100ML; G/100ML; G/100ML
100 INJECTION INTRAVENOUS CONTINUOUS
Status: DISCONTINUED | OUTPATIENT
Start: 2020-10-30 | End: 2020-11-01

## 2020-10-30 RX ORDER — FLUMAZENIL 0.1 MG/ML
0.2 INJECTION INTRAVENOUS AS NEEDED
Status: DISCONTINUED | OUTPATIENT
Start: 2020-10-30 | End: 2020-10-30 | Stop reason: HOSPADM

## 2020-10-30 RX ORDER — FAMOTIDINE 10 MG/ML
20 INJECTION, SOLUTION INTRAVENOUS
Status: COMPLETED | OUTPATIENT
Start: 2020-10-30 | End: 2020-10-30

## 2020-10-30 RX ORDER — SODIUM CHLORIDE 0.9 % (FLUSH) 0.9 %
3-10 SYRINGE (ML) INJECTION AS NEEDED
Status: DISCONTINUED | OUTPATIENT
Start: 2020-10-30 | End: 2020-10-30 | Stop reason: HOSPADM

## 2020-10-30 RX ORDER — MAGNESIUM HYDROXIDE 1200 MG/15ML
LIQUID ORAL AS NEEDED
Status: DISCONTINUED | OUTPATIENT
Start: 2020-10-30 | End: 2020-10-30 | Stop reason: HOSPADM

## 2020-10-30 RX ORDER — FENTANYL CITRATE 50 UG/ML
25 INJECTION, SOLUTION INTRAMUSCULAR; INTRAVENOUS
Status: DISCONTINUED | OUTPATIENT
Start: 2020-10-30 | End: 2020-10-30 | Stop reason: HOSPADM

## 2020-10-30 RX ORDER — SODIUM CHLORIDE 9 MG/ML
INJECTION, SOLUTION INTRAVENOUS AS NEEDED
Status: DISCONTINUED | OUTPATIENT
Start: 2020-10-30 | End: 2020-10-30 | Stop reason: HOSPADM

## 2020-10-30 RX ORDER — KETOROLAC TROMETHAMINE 30 MG/ML
INJECTION, SOLUTION INTRAMUSCULAR; INTRAVENOUS AS NEEDED
Status: DISCONTINUED | OUTPATIENT
Start: 2020-10-30 | End: 2020-10-30 | Stop reason: SURG

## 2020-10-30 RX ORDER — ONDANSETRON 2 MG/ML
4 INJECTION INTRAMUSCULAR; INTRAVENOUS AS NEEDED
Status: DISCONTINUED | OUTPATIENT
Start: 2020-10-30 | End: 2020-10-30 | Stop reason: HOSPADM

## 2020-10-30 RX ORDER — CELECOXIB 200 MG/1
200 CAPSULE ORAL ONCE
Status: COMPLETED | OUTPATIENT
Start: 2020-10-30 | End: 2020-10-30

## 2020-10-30 RX ADMIN — GABAPENTIN 600 MG: 300 CAPSULE ORAL at 06:58

## 2020-10-30 RX ADMIN — KETAMINE HYDROCHLORIDE 25 MG: 50 INJECTION, SOLUTION INTRAMUSCULAR; INTRAVENOUS at 10:35

## 2020-10-30 RX ADMIN — ROCURONIUM BROMIDE 25 MG: 10 INJECTION INTRAVENOUS at 08:12

## 2020-10-30 RX ADMIN — CEFOXITIN SODIUM 1 G: 1 POWDER, FOR SOLUTION INTRAVENOUS at 10:00

## 2020-10-30 RX ADMIN — ROCURONIUM BROMIDE 25 MG: 10 INJECTION INTRAVENOUS at 07:37

## 2020-10-30 RX ADMIN — KETAMINE HYDROCHLORIDE 25 MG: 50 INJECTION, SOLUTION INTRAMUSCULAR; INTRAVENOUS at 08:19

## 2020-10-30 RX ADMIN — FAMOTIDINE 20 MG: 10 INJECTION INTRAVENOUS at 06:44

## 2020-10-30 RX ADMIN — ACETAMINOPHEN 1000 MG: 500 TABLET, FILM COATED ORAL at 20:59

## 2020-10-30 RX ADMIN — HYDROMORPHONE HYDROCHLORIDE 100 MCG: 1 INJECTION, SOLUTION INTRAMUSCULAR; INTRAVENOUS; SUBCUTANEOUS at 07:29

## 2020-10-30 RX ADMIN — ONDANSETRON HYDROCHLORIDE 4 MG: 2 SOLUTION INTRAMUSCULAR; INTRAVENOUS at 10:30

## 2020-10-30 RX ADMIN — HYDROMORPHONE HYDROCHLORIDE 900 MCG: 1 INJECTION, SOLUTION INTRAMUSCULAR; INTRAVENOUS; SUBCUTANEOUS at 08:12

## 2020-10-30 RX ADMIN — BUPIVACAINE HYDROCHLORIDE 2.5 ML: 5 INJECTION, SOLUTION PERINEURAL at 07:29

## 2020-10-30 RX ADMIN — KETAMINE HYDROCHLORIDE 25 MG: 50 INJECTION, SOLUTION INTRAMUSCULAR; INTRAVENOUS at 09:48

## 2020-10-30 RX ADMIN — ONDANSETRON HYDROCHLORIDE 4 MG: 2 SOLUTION INTRAMUSCULAR; INTRAVENOUS at 06:44

## 2020-10-30 RX ADMIN — GABAPENTIN 300 MG: 300 CAPSULE ORAL at 16:19

## 2020-10-30 RX ADMIN — PROPOFOL 150 MG: 10 INJECTION, EMULSION INTRAVENOUS at 07:37

## 2020-10-30 RX ADMIN — DEXAMETHASONE SODIUM PHOSPHATE 4 MG: 4 INJECTION, SOLUTION INTRAMUSCULAR; INTRAVENOUS at 10:30

## 2020-10-30 RX ADMIN — POTASSIUM CHLORIDE, DEXTROSE MONOHYDRATE AND SODIUM CHLORIDE 100 ML/HR: 150; 5; 450 INJECTION, SOLUTION INTRAVENOUS at 14:58

## 2020-10-30 RX ADMIN — DEXAMETHASONE SODIUM PHOSPHATE 4 MG: 4 INJECTION, SOLUTION INTRAMUSCULAR; INTRAVENOUS at 06:44

## 2020-10-30 RX ADMIN — FAMOTIDINE 20 MG: 20 TABLET, FILM COATED ORAL at 20:53

## 2020-10-30 RX ADMIN — SODIUM CHLORIDE, POTASSIUM CHLORIDE, SODIUM LACTATE AND CALCIUM CHLORIDE: 600; 310; 30; 20 INJECTION, SOLUTION INTRAVENOUS at 08:03

## 2020-10-30 RX ADMIN — GABAPENTIN 300 MG: 300 CAPSULE ORAL at 20:53

## 2020-10-30 RX ADMIN — MIDAZOLAM HYDROCHLORIDE 1 MG: 2 INJECTION, SOLUTION INTRAMUSCULAR; INTRAVENOUS at 06:44

## 2020-10-30 RX ADMIN — CELECOXIB 200 MG: 200 CAPSULE ORAL at 07:02

## 2020-10-30 RX ADMIN — Medication 80 MCG: at 07:49

## 2020-10-30 RX ADMIN — Medication 80 MCG: at 10:07

## 2020-10-30 RX ADMIN — SODIUM CHLORIDE, POTASSIUM CHLORIDE, SODIUM LACTATE AND CALCIUM CHLORIDE 1000 ML: 600; 310; 30; 20 INJECTION, SOLUTION INTRAVENOUS at 06:20

## 2020-10-30 RX ADMIN — SCOPALAMINE 1 PATCH: 1 PATCH, EXTENDED RELEASE TRANSDERMAL at 06:44

## 2020-10-30 RX ADMIN — KETAMINE HYDROCHLORIDE 25 MG: 50 INJECTION, SOLUTION INTRAMUSCULAR; INTRAVENOUS at 07:35

## 2020-10-30 RX ADMIN — ACETAMINOPHEN 1000 MG: 500 TABLET, FILM COATED ORAL at 06:58

## 2020-10-30 RX ADMIN — Medication 80 MCG: at 10:19

## 2020-10-30 RX ADMIN — Medication 120 MCG: at 08:04

## 2020-10-30 RX ADMIN — MIDAZOLAM HYDROCHLORIDE 1 MG: 2 INJECTION, SOLUTION INTRAMUSCULAR; INTRAVENOUS at 07:18

## 2020-10-30 RX ADMIN — Medication 80 MCG: at 07:54

## 2020-10-30 RX ADMIN — Medication 80 MCG: at 09:55

## 2020-10-30 RX ADMIN — Medication 80 MCG: at 07:57

## 2020-10-30 RX ADMIN — KETOROLAC TROMETHAMINE 15 MG: 30 INJECTION, SOLUTION INTRAMUSCULAR at 10:30

## 2020-10-30 RX ADMIN — SODIUM CHLORIDE, POTASSIUM CHLORIDE, SODIUM LACTATE AND CALCIUM CHLORIDE: 600; 310; 30; 20 INJECTION, SOLUTION INTRAVENOUS at 10:23

## 2020-10-30 RX ADMIN — METOCLOPRAMIDE 10 MG: 5 INJECTION, SOLUTION INTRAMUSCULAR; INTRAVENOUS at 06:44

## 2020-10-30 RX ADMIN — ALVIMOPAN 12 MG: 12 CAPSULE ORAL at 06:58

## 2020-10-30 RX ADMIN — CEFOXITIN SODIUM 2 G: 1 POWDER, FOR SOLUTION INTRAVENOUS at 07:39

## 2020-10-30 NOTE — ANESTHESIA PREPROCEDURE EVALUATION
Anesthesia Evaluation     history of anesthetic complications: PONV  NPO Solid Status: > 8 hours  NPO Liquid Status: > 8 hours           Airway   Mallampati: II  TM distance: >3 FB  Neck ROM: full  No difficulty expected  Dental      Pulmonary    (+) asthma (uses inhaler daily),  (-) sleep apnea, not a smoker  Cardiovascular   Exercise tolerance: good (4-7 METS)    ECG reviewed    (+) hyperlipidemia,   (-) CAD      Neuro/Psych  (-) seizures, TIA, CVA  GI/Hepatic/Renal/Endo    (+)  GERD, PUD,  renal disease (cyst),     ROS Comment: diverticulosis    Musculoskeletal     Abdominal    Substance History      OB/GYN          Other                        Anesthesia Plan    ASA 2     general and ITN     intravenous induction     Anesthetic plan, all risks, benefits, and alternatives have been provided, discussed and informed consent has been obtained with: patient.

## 2020-10-30 NOTE — ANESTHESIA PROCEDURE NOTES
Spinal Block      Patient location during procedure: OB  Indication:at surgeon's request  Performed By  CRNA: Chetan Somers CRNA  Preanesthetic Checklist  Completed: patient identified, site marked, surgical consent, pre-op evaluation, timeout performed, IV checked, risks and benefits discussed and monitors and equipment checked  Spinal Block Prep:  Patient Position:sitting  Sterile Tech:cap, gloves, mask and sterile barriers  Prep:Chloraprep  Patient Monitoring:blood pressure monitoring, continuous pulse oximetry and EKG  Spinal Block Procedure  Approach:midline  Guidance:landmark technique  Location:L4-L5  Needle Type:Pencan  Needle Gauge:25 G  Placement of Spinal needle event:cerebrospinal fluid aspirated  Paresthesia: no  Fluid Appearance:clear  Medications: bupivacaine (MARCAINE) injection 0.5%, 2.5 mL  Med Administered at 10/30/2020 7:29 AM   Post Assessment  Patient Tolerance:patient tolerated the procedure well with no apparent complications  Complications no

## 2020-10-30 NOTE — ANESTHESIA PROCEDURE NOTES
Airway  Urgency: elective    Date/Time: 10/30/2020 7:41 AM  Airway not difficult    General Information and Staff    Patient location during procedure: OR  CRNA: Chetan Somers CRNA    Indications and Patient Condition  Indications for airway management: airway protection    Preoxygenated: yes  MILS maintained throughout  Mask difficulty assessment: 1 - vent by mask    Final Airway Details  Final airway type: endotracheal airway      Successful airway: ETT  Cuffed: yes   Successful intubation technique: direct laryngoscopy  Endotracheal tube insertion site: oral  Blade: Woodruff  Blade size: 2  ETT size (mm): 7.5  Cormack-Lehane Classification: grade IIa - partial view of glottis  Placement verified by: chest auscultation and capnometry   Cuff volume (mL): 5  Measured from: gums  ETT/EBT to gums (cm): 21  Number of attempts at approach: 1  Assessment: lips, teeth, and gum same as pre-op and atraumatic intubation

## 2020-10-30 NOTE — ANESTHESIA POSTPROCEDURE EVALUATION
Patient: Epifanio Crandall    Procedure Summary     Date: 10/30/20 Room / Location:  [facility] OR  /  [facility] OR    Anesthesia Start: 0722 Anesthesia Stop: 1114    Procedure: LAPAROSCOPIC ASSISTED SIGMOID COLECTOMY AND LAPAROSCOPIC TAKEDOWN OF SPLEENIC FLEXURE (N/A Abdomen) Diagnosis: (DIVERTICULITIS)    Surgeon: Gretchen Martinez MD Provider: Chetan Somers CRNA    Anesthesia Type: general, ITN ASA Status: 2          Anesthesia Type: general, ITN    Vitals  Vitals Value Taken Time   /75 10/30/20 1300   Temp 97.8 °F (36.6 °C) 10/30/20 1245   Pulse 96 10/30/20 1300   Resp 14 10/30/20 1300   SpO2 96 % 10/30/20 1300           Post Anesthesia Care and Evaluation    Patient location during evaluation: PACU  Patient participation: complete - patient participated  Level of consciousness: awake and alert  Pain management: adequate  Airway patency: patent  Anesthetic complications: No anesthetic complications    Cardiovascular status: acceptable  Respiratory status: acceptable  Hydration status: acceptable    Comments: Blood pressure 138/75, pulse 96, temperature 97.8 °F (36.6 °C), temperature source Temporal, resp. rate 14, SpO2 96 %.    Pt discharged from PACU based on karrie score >8

## 2020-10-31 LAB
ANION GAP SERPL CALCULATED.3IONS-SCNC: 6 MMOL/L (ref 5–15)
BASOPHILS # BLD AUTO: 0.01 10*3/MM3 (ref 0–0.2)
BASOPHILS NFR BLD AUTO: 0.1 % (ref 0–1.5)
BUN SERPL-MCNC: 10 MG/DL (ref 6–20)
BUN/CREAT SERPL: 14.3 (ref 7–25)
CALCIUM SPEC-SCNC: 8.5 MG/DL (ref 8.6–10.5)
CHLORIDE SERPL-SCNC: 104 MMOL/L (ref 98–107)
CO2 SERPL-SCNC: 25 MMOL/L (ref 22–29)
CREAT SERPL-MCNC: 0.7 MG/DL (ref 0.76–1.27)
DEPRECATED RDW RBC AUTO: 42.6 FL (ref 37–54)
EOSINOPHIL # BLD AUTO: 0 10*3/MM3 (ref 0–0.4)
EOSINOPHIL NFR BLD AUTO: 0 % (ref 0.3–6.2)
ERYTHROCYTE [DISTWIDTH] IN BLOOD BY AUTOMATED COUNT: 13.2 % (ref 12.3–15.4)
GFR SERPL CREATININE-BSD FRML MDRD: 118 ML/MIN/1.73
GLUCOSE SERPL-MCNC: 211 MG/DL (ref 65–99)
HCT VFR BLD AUTO: 37.9 % (ref 37.5–51)
HGB BLD-MCNC: 12.6 G/DL (ref 13–17.7)
IMM GRANULOCYTES # BLD AUTO: 0.07 10*3/MM3 (ref 0–0.05)
IMM GRANULOCYTES NFR BLD AUTO: 0.5 % (ref 0–0.5)
LYMPHOCYTES # BLD AUTO: 1.28 10*3/MM3 (ref 0.7–3.1)
LYMPHOCYTES NFR BLD AUTO: 8.4 % (ref 19.6–45.3)
MCH RBC QN AUTO: 29.4 PG (ref 26.6–33)
MCHC RBC AUTO-ENTMCNC: 33.2 G/DL (ref 31.5–35.7)
MCV RBC AUTO: 88.3 FL (ref 79–97)
MONOCYTES # BLD AUTO: 1.31 10*3/MM3 (ref 0.1–0.9)
MONOCYTES NFR BLD AUTO: 8.6 % (ref 5–12)
NEUTROPHILS NFR BLD AUTO: 12.62 10*3/MM3 (ref 1.7–7)
NEUTROPHILS NFR BLD AUTO: 82.4 % (ref 42.7–76)
NRBC BLD AUTO-RTO: 0 /100 WBC (ref 0–0.2)
PLATELET # BLD AUTO: 181 10*3/MM3 (ref 140–450)
PMV BLD AUTO: 11.1 FL (ref 6–12)
POTASSIUM SERPL-SCNC: 4.9 MMOL/L (ref 3.5–5.2)
RBC # BLD AUTO: 4.29 10*6/MM3 (ref 4.14–5.8)
SODIUM SERPL-SCNC: 135 MMOL/L (ref 136–145)
WBC # BLD AUTO: 15.29 10*3/MM3 (ref 3.4–10.8)

## 2020-10-31 PROCEDURE — 80048 BASIC METABOLIC PNL TOTAL CA: CPT | Performed by: SPECIALIST

## 2020-10-31 PROCEDURE — 25010000002 ENOXAPARIN PER 10 MG: Performed by: SPECIALIST

## 2020-10-31 PROCEDURE — 85025 COMPLETE CBC W/AUTO DIFF WBC: CPT | Performed by: SPECIALIST

## 2020-10-31 RX ADMIN — GABAPENTIN 300 MG: 300 CAPSULE ORAL at 17:51

## 2020-10-31 RX ADMIN — FAMOTIDINE 20 MG: 20 TABLET, FILM COATED ORAL at 20:00

## 2020-10-31 RX ADMIN — ACETAMINOPHEN 1000 MG: 500 TABLET, FILM COATED ORAL at 19:53

## 2020-10-31 RX ADMIN — ACETAMINOPHEN 1000 MG: 500 TABLET, FILM COATED ORAL at 08:42

## 2020-10-31 RX ADMIN — FAMOTIDINE 20 MG: 20 TABLET, FILM COATED ORAL at 08:42

## 2020-10-31 RX ADMIN — ENOXAPARIN SODIUM 40 MG: 40 INJECTION SUBCUTANEOUS at 08:42

## 2020-10-31 RX ADMIN — OXYCODONE HYDROCHLORIDE 7.5 MG: 5 TABLET ORAL at 19:53

## 2020-10-31 RX ADMIN — ACETAMINOPHEN 1000 MG: 500 TABLET, FILM COATED ORAL at 13:16

## 2020-10-31 RX ADMIN — ALVIMOPAN 12 MG: 12 CAPSULE ORAL at 08:42

## 2020-10-31 RX ADMIN — OXYCODONE HYDROCHLORIDE 7.5 MG: 5 TABLET ORAL at 13:03

## 2020-10-31 RX ADMIN — GABAPENTIN 300 MG: 300 CAPSULE ORAL at 20:00

## 2020-10-31 RX ADMIN — POTASSIUM CHLORIDE, DEXTROSE MONOHYDRATE AND SODIUM CHLORIDE 100 ML/HR: 150; 5; 450 INJECTION, SOLUTION INTRAVENOUS at 00:59

## 2020-10-31 RX ADMIN — ALVIMOPAN 12 MG: 12 CAPSULE ORAL at 20:00

## 2020-10-31 RX ADMIN — POTASSIUM CHLORIDE, DEXTROSE MONOHYDRATE AND SODIUM CHLORIDE 100 ML/HR: 150; 5; 450 INJECTION, SOLUTION INTRAVENOUS at 13:09

## 2020-10-31 RX ADMIN — OXYCODONE HYDROCHLORIDE 7.5 MG: 5 TABLET ORAL at 01:15

## 2020-10-31 RX ADMIN — GABAPENTIN 300 MG: 300 CAPSULE ORAL at 08:42

## 2020-10-31 RX ADMIN — CELECOXIB 200 MG: 200 CAPSULE ORAL at 08:42

## 2020-11-01 PROCEDURE — 25010000002 ENOXAPARIN PER 10 MG: Performed by: SPECIALIST

## 2020-11-01 RX ADMIN — ENOXAPARIN SODIUM 40 MG: 40 INJECTION SUBCUTANEOUS at 08:01

## 2020-11-01 RX ADMIN — OXYCODONE HYDROCHLORIDE 7.5 MG: 5 TABLET ORAL at 02:09

## 2020-11-01 RX ADMIN — ACETAMINOPHEN 1000 MG: 500 TABLET, FILM COATED ORAL at 02:09

## 2020-11-01 RX ADMIN — POTASSIUM CHLORIDE, DEXTROSE MONOHYDRATE AND SODIUM CHLORIDE 100 ML/HR: 150; 5; 450 INJECTION, SOLUTION INTRAVENOUS at 07:59

## 2020-11-01 RX ADMIN — FAMOTIDINE 20 MG: 20 TABLET, FILM COATED ORAL at 21:20

## 2020-11-01 RX ADMIN — OXYCODONE HYDROCHLORIDE 7.5 MG: 5 TABLET ORAL at 07:46

## 2020-11-01 RX ADMIN — FAMOTIDINE 20 MG: 20 TABLET, FILM COATED ORAL at 08:01

## 2020-11-01 RX ADMIN — OXYCODONE HYDROCHLORIDE 7.5 MG: 5 TABLET ORAL at 18:26

## 2020-11-01 RX ADMIN — GABAPENTIN 300 MG: 300 CAPSULE ORAL at 21:20

## 2020-11-01 RX ADMIN — CELECOXIB 200 MG: 200 CAPSULE ORAL at 08:01

## 2020-11-01 RX ADMIN — GABAPENTIN 300 MG: 300 CAPSULE ORAL at 08:01

## 2020-11-01 RX ADMIN — ALVIMOPAN 12 MG: 12 CAPSULE ORAL at 08:01

## 2020-11-01 RX ADMIN — GABAPENTIN 300 MG: 300 CAPSULE ORAL at 16:34

## 2020-11-01 RX ADMIN — OXYCODONE HYDROCHLORIDE 7.5 MG: 5 TABLET ORAL at 23:07

## 2020-11-01 RX ADMIN — OXYCODONE HYDROCHLORIDE 7.5 MG: 5 TABLET ORAL at 13:00

## 2020-11-01 NOTE — PLAN OF CARE
"  Pt had showed nursing staff a couple of bowel movements that were \"grainy\" and \"blood tinged\" in appearance. Pt otherwise reported pain control with two prn PO medications and appeared to sleep most of the night.     Problem: Adult Inpatient Plan of Care  Goal: Plan of Care Review  Outcome: Ongoing, Progressing  Goal: Patient-Specific Goal (Individualized)  Outcome: Ongoing, Progressing  Goal: Absence of Hospital-Acquired Illness or Injury  Outcome: Ongoing, Progressing  Intervention: Identify and Manage Fall Risk  Recent Flowsheet Documentation  Taken 11/1/2020 0239 by Jose Velasquez RN  Safety Promotion/Fall Prevention: safety round/check completed  Taken 11/1/2020 0215 by Jose Velasquez RN  Safety Promotion/Fall Prevention: safety round/check completed  Taken 11/1/2020 0015 by Jose Velasquez RN  Safety Promotion/Fall Prevention: safety round/check completed  Taken 10/31/2020 2229 by Jose Velasquez RN  Safety Promotion/Fall Prevention: safety round/check completed  Taken 10/31/2020 2000 by Jose Velasquez RN  Safety Promotion/Fall Prevention: safety round/check completed  Taken 10/31/2020 1953 by Jose Velasquez RN  Safety Promotion/Fall Prevention: safety round/check completed  Goal: Optimal Comfort and Wellbeing  Outcome: Ongoing, Progressing  Goal: Readiness for Transition of Care  Outcome: Ongoing, Progressing     Problem: Bleeding (Surgery Nonspecified)  Goal: Absence of Bleeding  Outcome: Ongoing, Progressing     Problem: Bowel Elimination Impaired (Surgery Nonspecified)  Goal: Effective Bowel Elimination  Outcome: Ongoing, Progressing     Problem: Infection (Surgery Nonspecified)  Goal: Absence of Infection Signs and Symptoms  Outcome: Ongoing, Progressing     Problem: Ongoing Anesthesia Effects (Surgery Nonspecified)  Goal: Anesthesia/Sedation Recovery  Outcome: Ongoing, Progressing  Intervention: Optimize Anesthesia Recovery  Recent Flowsheet Documentation  Taken 11/1/2020 0239 by " Velasquez, Scytha T, RN  Safety Promotion/Fall Prevention: safety round/check completed  Taken 11/1/2020 0215 by Jose Velasquez RN  Safety Promotion/Fall Prevention: safety round/check completed  Taken 11/1/2020 0015 by Jose Velasquez RN  Safety Promotion/Fall Prevention: safety round/check completed  Taken 10/31/2020 2229 by Jose Velasquez RN  Safety Promotion/Fall Prevention: safety round/check completed  Taken 10/31/2020 2000 by Jose Velasquez RN  Safety Promotion/Fall Prevention: safety round/check completed  Taken 10/31/2020 1953 by Jose Velasquez RN  Safety Promotion/Fall Prevention: safety round/check completed     Problem: Pain (Surgery Nonspecified)  Goal: Acceptable Pain Control  Outcome: Ongoing, Progressing     Problem: Postoperative Nausea and Vomiting (Surgery Nonspecified)  Goal: Nausea and Vomiting Relief  Outcome: Ongoing, Progressing     Problem: Postoperative Urinary Retention (Surgery Nonspecified)  Goal: Effective Urinary Elimination  Outcome: Ongoing, Progressing   Goal Outcome Evaluation:  Plan of Care Reviewed With: patient  Progress: improving

## 2020-11-01 NOTE — PLAN OF CARE
Goal Outcome Evaluation:  Plan of Care Reviewed With: patient  Progress: improving  Outcome Summary: Pt doing well. Large BM today. Ambulating. Tolerating fulls. IV infiltrated- D/C. No IV. Will cont to monitor

## 2020-11-01 NOTE — PROGRESS NOTES
Gretchen Martinez MD Progress Note     LOS: 2 days   Patient Care Team:  Ivone Trujillo APRN as PCP - General (Family Medicine)  Epifanio Sandhu MD as Consulting Physician (Gastroenterology)        Subjective     Tolerating clear liquids.  Had several bowel movements yesterday.  Ambulating.  Pain adequately controlled    Objective     Vital Signs  Temp:  [98 °F (36.7 °C)-98.5 °F (36.9 °C)] 98 °F (36.7 °C)  Heart Rate:  [71-84] 74  Resp:  [16] 16  BP: (106-128)/(62-92) 120/74    Intake & Output (last 3 days)       10/29 0701 - 10/30 0700 10/30 0701 - 10/31 0700 10/31 0701 - 11/01 0700 11/01 0701 - 11/02 0700    I.V. (mL/kg)  3586.9 (37.1) 1718 (17.7)     Total Intake(mL/kg)  3586.9 (37.1) 1718 (17.7)     Urine (mL/kg/hr)  4210 (1.8) 4150 (1.8) 1000 (6.9)    Stool   0     Total Output  4210 4150 1000    Net  -623.1 -2432 -1000            Stool Unmeasured Occurrence   1 x           Physical Exam:     General Appearance:    Alert, cooperative, in no acute distress   Lungs:     respirations regular, even and unlabored    Heart:    Regular rhythm and normal rate, normal S1 and S2, no            murmur, no gallop, no rub   Chest Wall:    No abnormalities observed   Abdomen:      Soft dressing intact little drainage in the inferior aspect of it.   Extremities: No edema,    Results Review:     I reviewed the patient's new clinical results.    Lab Results (last 72 hours)     Procedure Component Value Units Date/Time    Basic Metabolic Panel [[id number]]  (Abnormal) Collected: 10/31/20 0504    Specimen: Blood Updated: 10/31/20 0557     Glucose 211 mg/dL      BUN 10 mg/dL      Creatinine 0.70 mg/dL      Sodium 135 mmol/L      Potassium 4.9 mmol/L      Chloride 104 mmol/L      CO2 25.0 mmol/L      Calcium 8.5 mg/dL      eGFR Non African Amer 118 mL/min/1.73      BUN/Creatinine Ratio 14.3     Anion Gap 6.0 mmol/L     Narrative:      GFR Normal >60  Chronic Kidney Disease <60  Kidney Failure <15      CBC & Differential [[id number]]   (Abnormal) Collected: 10/31/20 0504    Specimen: Blood Updated: 10/31/20 0540    Narrative:      The following orders were created for panel order CBC & Differential.  Procedure                               Abnormality         Status                     ---------                               -----------         ------                     CBC Auto Differential[[id number]]        Abnormal            Final result                 Please view results for these tests on the individual orders.    CBC Auto Differential [[id number]]  (Abnormal) Collected: 10/31/20 0504    Specimen: Blood Updated: 10/31/20 0540     WBC 15.29 10*3/mm3      RBC 4.29 10*6/mm3      Hemoglobin 12.6 g/dL      Hematocrit 37.9 %      MCV 88.3 fL      MCH 29.4 pg      MCHC 33.2 g/dL      RDW 13.2 %      RDW-SD 42.6 fl      MPV 11.1 fL      Platelets 181 10*3/mm3      Neutrophil % 82.4 %      Lymphocyte % 8.4 %      Monocyte % 8.6 %      Eosinophil % 0.0 %      Basophil % 0.1 %      Immature Grans % 0.5 %      Neutrophils, Absolute 12.62 10*3/mm3      Lymphocytes, Absolute 1.28 10*3/mm3      Monocytes, Absolute 1.31 10*3/mm3      Eosinophils, Absolute 0.00 10*3/mm3      Basophils, Absolute 0.01 10*3/mm3      Immature Grans, Absolute 0.07 10*3/mm3      nRBC 0.0 /100 WBC     Tissue Pathology Exam [[id number]] Collected: 10/30/20 0819    Specimen: Tissue from Large Intestine, Sigmoid Colon Updated: 10/30/20 1346        Imaging Results (Last 72 Hours)     ** No results found for the last 72 hours. **              Assessment/Plan       Diverticulitis      Advance to full liquids.  Hep-Lock IV.  Hopefully home tomorrow.      Gretchen Martinez MD  11/01/20  08:30 CST

## 2020-11-02 VITALS
HEART RATE: 85 BPM | WEIGHT: 213.41 LBS | TEMPERATURE: 99.6 F | BODY MASS INDEX: 36.43 KG/M2 | RESPIRATION RATE: 16 BRPM | OXYGEN SATURATION: 94 % | SYSTOLIC BLOOD PRESSURE: 127 MMHG | HEIGHT: 64 IN | DIASTOLIC BLOOD PRESSURE: 86 MMHG

## 2020-11-02 PROCEDURE — 25010000002 ENOXAPARIN PER 10 MG: Performed by: SPECIALIST

## 2020-11-02 RX ORDER — OXYCODONE AND ACETAMINOPHEN 10; 325 MG/1; MG/1
1 TABLET ORAL EVERY 4 HOURS PRN
Qty: 30 TABLET | Refills: 0 | Status: SHIPPED | OUTPATIENT
Start: 2020-11-02 | End: 2022-12-08

## 2020-11-02 RX ADMIN — OXYCODONE HYDROCHLORIDE 7.5 MG: 5 TABLET ORAL at 03:42

## 2020-11-02 RX ADMIN — CELECOXIB 200 MG: 200 CAPSULE ORAL at 08:37

## 2020-11-02 RX ADMIN — FAMOTIDINE 20 MG: 20 TABLET, FILM COATED ORAL at 08:37

## 2020-11-02 RX ADMIN — GABAPENTIN 300 MG: 300 CAPSULE ORAL at 08:37

## 2020-11-02 RX ADMIN — OXYCODONE HYDROCHLORIDE 7.5 MG: 5 TABLET ORAL at 11:52

## 2020-11-02 RX ADMIN — ENOXAPARIN SODIUM 40 MG: 40 INJECTION SUBCUTANEOUS at 08:37

## 2020-11-02 RX ADMIN — OXYCODONE HYDROCHLORIDE 7.5 MG: 5 TABLET ORAL at 07:33

## 2020-11-02 NOTE — PAYOR COMM NOTE
"Epifanio Nuno (50 y.o. Male) [id number]    drg  Facility      [facility]    F[facility]         Date of Birth Social Security Number Address Home Phone MRN    1970  [address] [place] [address] [phone] [id number]    [employer] Marital Status          Other        Admission Date Admission Type Admitting Provider Attending Provider Department, Room/Bed    10/30/20 Elective Gretchen Martinez MD Tyrrell, Dana R, MD [facility] 3C, 391/1    Discharge Date Discharge Disposition Discharge Destination                       Attending Provider: Gretchen Martinez MD    Allergies: Hydrocodone, Hydrocodone-acetaminophen    Isolation: None   Infection: None   Code Status: CPR    Ht: 162 cm (63.78\")   Wt: 96.8 kg (213 lb 6.5 oz)    Admission Cmt: None   Principal Problem: None                Active Insurance as of 10/30/2020     Primary Coverage     Payor Plan Insurance Group Employer/Plan Group    ANTH [employer] ANTH [employer] O [id number]     Payor Plan Address Payor Plan Phone Number Payor Plan Fax Number Effective Dates    [address] [phone]  1/1/2020 - None Entered    [facility] [address]       Subscriber Name Subscriber Birth Date Member ID       EPIFANIO NUNO 1970 [id number]                 Emergency Contacts      (Rel.) Home Phone Work Phone Mobile Phone    Cody Nuno (Spouse) [phone] -- [phone]    Twyla Mcclellan (Mother) [phone] -- [phone]               History & Physical      Gretchen Martinez MD at 10/30/20 0724          H&P reviewed. The patient was examined and there are no changes to the H&P.          Electronically signed by Gretchen Martinez MD at 10/30/20 0724   Source Note     Scan on 10/30/2020 by [employer], [place]: H&amp;P, SRG GRP PAD, 10/22/2020          Electronically signed by [employer], [employer] at 10/29/20 0827             H&P signed by [employer] On[employer], " [place] at 10/29/20 0827      Scan on 10/30/2020 by [employer], [employer]: H&amp;P, SRG GRP PAD, 10/22/2020          Electronically signed by [employer], [employer] at 10/29/20 0827         Current Facility-Administered Medications   Medication Dose Route Frequency Provider Last Rate Last Dose   • celecoxib (CeleBREX) capsule 200 mg  200 mg Oral Daily Gretchen Martinez MD   200 mg at 11/02/20 0837   • enoxaparin (LOVENOX) syringe 40 mg  40 mg Subcutaneous Daily Gretchen Martinez MD   40 mg at 11/02/20 0837   • famotidine (PEPCID) tablet 20 mg  20 mg Oral BID Gretchen Martinez MD   20 mg at 11/02/20 0837   • morphine injection 4 mg  4 mg Intravenous Q4H PRN Gretchen Martinez MD        And   • naloxone (NARCAN) injection 0.4 mg  0.4 mg Intravenous Q5 Min PRN Gretchen Martinez MD       • Morphine sulfate (PF) injection 2 mg  2 mg Intravenous Q3H PRN Gretchen Martinez MD        And   • naloxone (NARCAN) injection 0.4 mg  0.4 mg Intravenous Q5 Min PRN Gretchen Martinez MD       • ondansetron (ZOFRAN) injection 4 mg  4 mg Intravenous Q6H PRN Gretchen Martinez MD       • oxyCODONE (ROXICODONE) immediate release tablet 7.5 mg  7.5 mg Oral Q4H PRN Gretchen Martinez MD   7.5 mg at 11/02/20 0733        Operative/Procedure Notes (last 7 days) (Notes from 10/26/20 1101 through 11/02/20 1101)      Gretchen Martinez MD at 10/30/20 0710          COLON RESECTION LAPAROSCOPIC SIGMOID OR LOW ANTERIOR  Procedure Note    Epifanio Crandall  10/30/2020    Pre-op Diagnosis:   DIVERTICULITIS    Post-op Diagnosis:     same    Procedure/CPT® Codes:      Procedure(s):  LAPAROSCOPIC ASSISTED SIGMOID COLECTOMY, laparoscopic takedown of splenic flexure    Surgeon(s):  Gretchen Martinez MD    Anesthesia: General    Staff:   Circulator: Dominic Mcleod RN; Bea Garay RN  Scrub Person: Adilene Dowell; Turner, Coty    Estimated Blood Loss: < 100ml    Specimens:                Specimens     ID Source Type Tests Collected By Collected At Frozen?      A  Large Intestine, Sigmoid Colon Tissue · TISSUE PATHOLOGY EXAM   Gretchen Martinez MD 10/30/20 0819      This specimen was not marked as sent.            Drains:   Urethral Catheter Double-lumen 16 Fr. (Active)       Findings: Severe chronic inflammation and scarring of the sigmoid to the left pelvic sidewall    Complications: none    Description: The patient was brought to the operating room and placed in the supine position.  After induction of general anesthesia and infusion of IV antibiotics the patient was positioned in the lithotomy position, Vera catheter placed, and the patient was prepped and draped in the usual sterile fashion.  Veress needle technique was used to an infraumbilical midline incision in the abdomen was insufflated to a pressure of 15 mmHg.  A total of 4 5 mm ports were placed under direct vision.  The abdomen was inspected.  The patient was positioned in Trendelenburg position and rolled slightly to the right area the descending colon and sigmoid colon were mobilized off of the lateral wall.  The majority of the sigmoid colon was stuck to the anterior and left lateral pelvic wall.  I stopped where it was stuck.  We then continued proximally and mobilized the splenic flexure laparoscopically.  We freed the distal transverse colon from the omentum and then traveled more distally.  We then dissected the proximal descending colon laterally and mobilized up to the splenic flexure.  I then connecting these 2 areas and retracted the splenic flexure down were able to get this completely mobilized.  I then started dissecting the sigmoid colon off of the lateral wall but it was too scarred so elected to convert to the open part of the procedure.  Midline incision was made.  Ultimately had to extended above the umbilicus because of the significant scarring and poor visualization.  With this I was able to mobilize the sigmoid colon down to the proximal rectum.  The left ureter was identified and  protected.  Once that is completed mobilized I selected the division lines of the proximal sigmoid and proximal rectum and freed it from the mesentery.  I then divided the mesentery with the harmonic scalpel.  The bowel was divided with cautery proximally distally and the specimen passed off.  Irrigation was performed.  I then created a handsewn double layer anastomosis with seromuscular 2-0 silk sutures placed interrupted in the posterior layer.  I then ran the running locking 3-0 Vicryl mucosal layer.  Then the anterior seromuscular interrupted 2-0 silk sutures were placed.  Copious irrigation was performed.  We switched to the clean set up.  Seprafilm was placed.  The closure was begun by running a 2-0 Vicryl on the peritoneum inferiorly.  Then the fascia was closed with interrupted figure-of-eight #1 Prolenes.  The wound irrigated.  20 and 4-0 Vicryl suture were used on the skin.  Dressing was placed.  Patient was awakened and transferred to the recovery room in stable condition having tolerated the procedure well.  At the end of the procedure all counts were correct      Gretchen Martinez MD     Date: 10/30/2020  Time: 11:12 CDT        Electronically signed by Gretchen Martinez MD at 10/30/20 1117          Physician Progress Notes (last 72 hours) (Notes from 10/30/20 1101 through 11/02/20 1101)      Gretchen Martinez MD at 11/01/20 0830          Gretchen Martinez MD Progress Note     LOS: 2 days   Patient Care Team:  Ivone Trujillo APRN as PCP - General (Family Medicine)  Epifanio Sandhu MD as Consulting Physician (Gastroenterology)        Subjective     Tolerating clear liquids.  Had several bowel movements yesterday.  Ambulating.  Pain adequately controlled    Objective     Vital Signs  Temp:  [98 °F (36.7 °C)-98.5 °F (36.9 °C)] 98 °F (36.7 °C)  Heart Rate:  [71-84] 74  Resp:  [16] 16  BP: (106-128)/(62-92) 120/74    Intake & Output (last 3 days)       10/29 0701 - 10/30 0700 10/30 0701 - 10/31 0700 10/31 0701 -  11/01 0700 11/01 0701 - 11/02 0700    I.V. (mL/kg)  3586.9 (37.1) 1718 (17.7)     Total Intake(mL/kg)  3586.9 (37.1) 1718 (17.7)     Urine (mL/kg/hr)  4210 (1.8) 4150 (1.8) 1000 (6.9)    Stool   0     Total Output  4210 4150 1000    Net  -623.1 -2432 -1000            Stool Unmeasured Occurrence   1 x           Physical Exam:     General Appearance:    Alert, cooperative, in no acute distress   Lungs:     respirations regular, even and unlabored    Heart:    Regular rhythm and normal rate, normal S1 and S2, no            murmur, no gallop, no rub   Chest Wall:    No abnormalities observed   Abdomen:      Soft dressing intact little drainage in the inferior aspect of it.   Extremities: No edema,    Results Review:     I reviewed the patient's new clinical results.    Lab Results (last 72 hours)     Procedure Component Value Units Date/Time    Basic Metabolic Panel [[id number]]  (Abnormal) Collected: 10/31/20 0504    Specimen: Blood Updated: 10/31/20 0557     Glucose 211 mg/dL      BUN 10 mg/dL      Creatinine 0.70 mg/dL      Sodium 135 mmol/L      Potassium 4.9 mmol/L      Chloride 104 mmol/L      CO2 25.0 mmol/L      Calcium 8.5 mg/dL      eGFR Non African Amer 118 mL/min/1.73      BUN/Creatinine Ratio 14.3     Anion Gap 6.0 mmol/L     Narrative:      GFR Normal >60  Chronic Kidney Disease <60  Kidney Failure <15      CBC & Differential [[id number]]  (Abnormal) Collected: 10/31/20 0504    Specimen: Blood Updated: 10/31/20 0540    Narrative:      The following orders were created for panel order CBC & Differential.  Procedure                               Abnormality         Status                     ---------                               -----------         ------                     CBC Auto Differential[[id number]]        Abnormal            Final result                 Please view results for these tests on the individual orders.    CBC Auto Differential [[id number]]  (Abnormal) Collected: 10/31/20 0504    Specimen:  Blood Updated: 10/31/20 0540     WBC 15.29 10*3/mm3      RBC 4.29 10*6/mm3      Hemoglobin 12.6 g/dL      Hematocrit 37.9 %      MCV 88.3 fL      MCH 29.4 pg      MCHC 33.2 g/dL      RDW 13.2 %      RDW-SD 42.6 fl      MPV 11.1 fL      Platelets 181 10*3/mm3      Neutrophil % 82.4 %      Lymphocyte % 8.4 %      Monocyte % 8.6 %      Eosinophil % 0.0 %      Basophil % 0.1 %      Immature Grans % 0.5 %      Neutrophils, Absolute 12.62 10*3/mm3      Lymphocytes, Absolute 1.28 10*3/mm3      Monocytes, Absolute 1.31 10*3/mm3      Eosinophils, Absolute 0.00 10*3/mm3      Basophils, Absolute 0.01 10*3/mm3      Immature Grans, Absolute 0.07 10*3/mm3      nRBC 0.0 /100 WBC     Tissue Pathology Exam [[id number]] Collected: 10/30/20 0819    Specimen: Tissue from Large Intestine, Sigmoid Colon Updated: 10/30/20 1346        Imaging Results (Last 72 Hours)     ** No results found for the last 72 hours. **              Assessment/Plan       Diverticulitis      Advance to full liquids.  Hep-Lock IV.  Hopefully home tomorrow.      Gretchen Martinez MD  11/01/20  08:30 CST        Electronically signed by Gretchen Martinez MD at 11/01/20 0830     Gretchen Martinez MD at 10/31/20 1224          Gretchen Martinez MD Progress Note     LOS: 1 day   Patient Care Team:  Ivone Trujillo APRN as PCP - General (Family Medicine)  Epifanio Sandhu MD as Consulting Physician (Gastroenterology)        Subjective     Operative findings plain.  Patient feels good    Objective     Vital Signs  Temp:  [97.8 °F (36.6 °C)-98.3 °F (36.8 °C)] 98.1 °F (36.7 °C)  Heart Rate:  [] 79  Resp:  [14-18] 16  BP: (103-142)/(63-85) 111/66    Intake & Output (last 3 days)       10/28 0701 - 10/29 0700 10/29 0701 - 10/30 0700 10/30 0701 - 10/31 0700 10/31 0701 - 11/01 0700    I.V. (mL/kg)   3586.9 (37.1) 717 (7.4)    Total Intake(mL/kg)   3586.9 (37.1) 717 (7.4)    Urine (mL/kg/hr)   4210 (1.8) 1300 (2.5)    Total Output   4210 1300    Net   -623.1 -583                   Physical Exam:     General Appearance:    Alert, cooperative, in no acute distress   Lungs:     respirations regular, even and unlabored    Heart:    Regular rhythm and normal rate, normal S1 and S2, no            murmur, no gallop, no rub   Chest Wall:    No abnormalities observed   Abdomen:      Soft    Extremities: No edema,    Results Review:     I reviewed the patient's new clinical results.    Lab Results (last 72 hours)     Procedure Component Value Units Date/Time    Basic Metabolic Panel [[id number]]  (Abnormal) Collected: 10/31/20 0504    Specimen: Blood Updated: 10/31/20 0557     Glucose 211 mg/dL      BUN 10 mg/dL      Creatinine 0.70 mg/dL      Sodium 135 mmol/L      Potassium 4.9 mmol/L      Chloride 104 mmol/L      CO2 25.0 mmol/L      Calcium 8.5 mg/dL      eGFR Non African Amer 118 mL/min/1.73      BUN/Creatinine Ratio 14.3     Anion Gap 6.0 mmol/L     Narrative:      GFR Normal >60  Chronic Kidney Disease <60  Kidney Failure <15      CBC & Differential [[id number]]  (Abnormal) Collected: 10/31/20 0504    Specimen: Blood Updated: 10/31/20 0540    Narrative:      The following orders were created for panel order CBC & Differential.  Procedure                               Abnormality         Status                     ---------                               -----------         ------                     CBC Auto Differential[[id number]]        Abnormal            Final result                 Please view results for these tests on the individual orders.    CBC Auto Differential [[id number]]  (Abnormal) Collected: 10/31/20 0504    Specimen: Blood Updated: 10/31/20 0540     WBC 15.29 10*3/mm3      RBC 4.29 10*6/mm3      Hemoglobin 12.6 g/dL      Hematocrit 37.9 %      MCV 88.3 fL      MCH 29.4 pg      MCHC 33.2 g/dL      RDW 13.2 %      RDW-SD 42.6 fl      MPV 11.1 fL      Platelets 181 10*3/mm3      Neutrophil % 82.4 %      Lymphocyte % 8.4 %      Monocyte % 8.6 %      Eosinophil % 0.0 %       Basophil % 0.1 %      Immature Grans % 0.5 %      Neutrophils, Absolute 12.62 10*3/mm3      Lymphocytes, Absolute 1.28 10*3/mm3      Monocytes, Absolute 1.31 10*3/mm3      Eosinophils, Absolute 0.00 10*3/mm3      Basophils, Absolute 0.01 10*3/mm3      Immature Grans, Absolute 0.07 10*3/mm3      nRBC 0.0 /100 WBC     Tissue Pathology Exam [[id number]] Collected: 10/30/20 0819    Specimen: Tissue from Large Intestine, Sigmoid Colon Updated: 10/30/20 1346        Imaging Results (Last 72 Hours)     ** No results found for the last 72 hours. **              Assessment/Plan       Diverticulitis      Elevated continue current treatment.  Ambulate.  Clear liquids.  FRANCISCO JAVIER Martinez MD  10/31/20  12:25 CDT        Electronically signed by Gretchen Martinez MD at 10/31/20 7049

## 2020-11-02 NOTE — PLAN OF CARE
VSS, goals met.  Patient being discharged home.  Verbalizes understanding of all discharge instructions.    Problem: Adult Inpatient Plan of Care  Goal: Plan of Care Review  Outcome: Met   Goal Outcome Evaluation:  Plan of Care Reviewed With: patient  Progress: improving

## 2020-11-02 NOTE — DISCHARGE SUMMARY
Gretchen Martinez MD Discharge Summary    Date of Admission: 10/30/2020  Date of Discharge:  11/2/2020    Discharge Diagnosis: Diverticulitis    Presenting Problem/History of Present Illness    History and Physical as outlined in the chart    Hospital Course  Patient was admitted after undergoing the above operation.  Hospital course postoperatively was uneventful.  Patient will be discharged to home.  See medication reconciliation for medications at discharge.    Procedures Performed  Procedure(s):  LAPAROSCOPIC ASSISTED SIGMOID COLECTOMY AND LAPAROSCOPIC TAKEDOWN OF SPLEENIC FLEXURE       Consults:   Consults     No orders found from 10/1/2020 to 10/31/2020.          Condition on Discharge:  stable      Discharge Disposition  Home or Self Care    Discharge Medications     Discharge Medications      New Medications      Instructions Start Date   oxyCODONE-acetaminophen  MG per tablet  Commonly known as: PERCOCET   1 tablet, Oral, Every 4 Hours PRN         Continue These Medications      Instructions Start Date   albuterol sulfate  (90 Base) MCG/ACT inhaler  Commonly known as: PROVENTIL HFA;VENTOLIN HFA;PROAIR HFA   2 puffs, Inhalation, Every 4 Hours PRN      ciprofloxacin 500 MG tablet  Commonly known as: CIPRO   500 mg, Oral, Daily      erythromycin 500 MG EC tablet  Commonly known as: AILYN-TAB   500 mg, Oral, Once, Instructions to take day before surgery       metroNIDAZOLE 500 MG tablet  Commonly known as: FLAGYL   500 mg, Oral, 3 Times Daily      neomycin 500 MG tablet  Commonly known as: MYCIFRADIN   1,000 mg, Oral, Once, Instructions on taking day before surgery              Discharge Diet:     Activity at Discharge:     Follow-up Appointments  No future appointments.  Additional Instructions for the Follow-ups that You Need to Schedule     Discharge Follow-up with Specified Provider: me; 3 Weeks   As directed      To: me    Follow Up: 3 Weeks               Test Results Pending at Discharge  Pending  Labs     Order Current Status    Tissue Pathology Exam In process           Gretchen Martinez MD  11/02/20  14:29 CST    Time: Time spent at discharge 30 minutes

## 2020-11-02 NOTE — PLAN OF CARE
Pt had an uneventful night. He walked the halls before lying down to sleep. Pt appeared to sleep most of the night and only required one PO pain pill at start of the shift.     Problem: Adult Inpatient Plan of Care  Goal: Plan of Care Review  Outcome: Ongoing, Progressing  Goal: Patient-Specific Goal (Individualized)  Outcome: Ongoing, Progressing  Goal: Absence of Hospital-Acquired Illness or Injury  Outcome: Ongoing, Progressing  Intervention: Identify and Manage Fall Risk  Recent Flowsheet Documentation  Taken 11/2/2020 0205 by Jose Velasquez RN  Safety Promotion/Fall Prevention: safety round/check completed  Taken 11/2/2020 0013 by Jose Velasquez RN  Safety Promotion/Fall Prevention: safety round/check completed  Taken 11/1/2020 2235 by Jose Velasquez RN  Safety Promotion/Fall Prevention: safety round/check completed  Taken 11/1/2020 2000 by Jose Velasquez RN  Safety Promotion/Fall Prevention: safety round/check completed  Intervention: Prevent and Manage VTE (venous thromboembolism) Risk  Recent Flowsheet Documentation  Taken 11/1/2020 2000 by Jose Velasquez RN  VTE Prevention/Management: (Pt has walked hallways often today. )   sequential compression devices off   patient refused intervention  Goal: Optimal Comfort and Wellbeing  Outcome: Ongoing, Progressing  Goal: Readiness for Transition of Care  Outcome: Ongoing, Progressing     Problem: Bleeding (Surgery Nonspecified)  Goal: Absence of Bleeding  Outcome: Ongoing, Progressing     Problem: Bowel Elimination Impaired (Surgery Nonspecified)  Goal: Effective Bowel Elimination  Outcome: Ongoing, Progressing     Problem: Infection (Surgery Nonspecified)  Goal: Absence of Infection Signs and Symptoms  Outcome: Ongoing, Progressing     Problem: Ongoing Anesthesia Effects (Surgery Nonspecified)  Goal: Anesthesia/Sedation Recovery  Outcome: Ongoing, Progressing  Intervention: Optimize Anesthesia Recovery  Recent Flowsheet Documentation  Taken  11/2/2020 0205 by Jose Velasquez RN  Safety Promotion/Fall Prevention: safety round/check completed  Taken 11/2/2020 0013 by Jose Velasquez RN  Safety Promotion/Fall Prevention: safety round/check completed  Taken 11/1/2020 2235 by Jose Velasquez RN  Safety Promotion/Fall Prevention: safety round/check completed  Taken 11/1/2020 2000 by Jose Velasquez RN  Safety Promotion/Fall Prevention: safety round/check completed     Problem: Pain (Surgery Nonspecified)  Goal: Acceptable Pain Control  Outcome: Ongoing, Progressing     Problem: Postoperative Nausea and Vomiting (Surgery Nonspecified)  Goal: Nausea and Vomiting Relief  Outcome: Ongoing, Progressing     Problem: Postoperative Urinary Retention (Surgery Nonspecified)  Goal: Effective Urinary Elimination  Outcome: Ongoing, Progressing   Goal Outcome Evaluation:  Plan of Care Reviewed With: patient  Progress: improving

## 2020-11-03 ENCOUNTER — HOSPITAL ENCOUNTER (INPATIENT)
Facility: HOSPITAL | Age: 50
LOS: 4 days | Discharge: HOME | End: 2020-11-07
Attending: FAMILY MEDICINE | Admitting: SPECIALIST

## 2020-11-03 ENCOUNTER — APPOINTMENT (OUTPATIENT)
Dept: CT IMAGING | Facility: HOSPITAL | Age: 50
End: 2020-11-03

## 2020-11-03 ENCOUNTER — APPOINTMENT (OUTPATIENT)
Dept: GENERAL RADIOLOGY | Facility: HOSPITAL | Age: 50
End: 2020-11-03

## 2020-11-03 DIAGNOSIS — K57.32 DIVERTICULITIS OF LARGE INTESTINE WITH COMPLICATION: Primary | ICD-10-CM

## 2020-11-03 LAB
ALBUMIN SERPL-MCNC: 4.3 G/DL (ref 3.5–5.2)
ALBUMIN/GLOB SERPL: 1.2 G/DL
ALP SERPL-CCNC: 93 U/L (ref 39–117)
ALT SERPL W P-5'-P-CCNC: 33 U/L (ref 1–41)
ANION GAP SERPL CALCULATED.3IONS-SCNC: 10 MMOL/L (ref 5–15)
APTT PPP: 32 SECONDS (ref 24.1–35)
AST SERPL-CCNC: 25 U/L (ref 1–40)
BASOPHILS # BLD AUTO: 0.05 10*3/MM3 (ref 0–0.2)
BASOPHILS NFR BLD AUTO: 0.3 % (ref 0–1.5)
BILIRUB SERPL-MCNC: 0.9 MG/DL (ref 0–1.2)
BUN SERPL-MCNC: 12 MG/DL (ref 6–20)
BUN/CREAT SERPL: 18.5 (ref 7–25)
CALCIUM SPEC-SCNC: 9.7 MG/DL (ref 8.6–10.5)
CHLORIDE SERPL-SCNC: 96 MMOL/L (ref 98–107)
CO2 SERPL-SCNC: 27 MMOL/L (ref 22–29)
CREAT SERPL-MCNC: 0.65 MG/DL (ref 0.76–1.27)
CRP SERPL-MCNC: 10.9 MG/DL (ref 0–0.5)
DEPRECATED RDW RBC AUTO: 40.7 FL (ref 37–54)
EOSINOPHIL # BLD AUTO: 0.37 10*3/MM3 (ref 0–0.4)
EOSINOPHIL NFR BLD AUTO: 2.2 % (ref 0.3–6.2)
ERYTHROCYTE [DISTWIDTH] IN BLOOD BY AUTOMATED COUNT: 13.2 % (ref 12.3–15.4)
GFR SERPL CREATININE-BSD FRML MDRD: 129 ML/MIN/1.73
GLOBULIN UR ELPH-MCNC: 3.6 GM/DL
GLUCOSE SERPL-MCNC: 125 MG/DL (ref 65–99)
HCT VFR BLD AUTO: 44.7 % (ref 37.5–51)
HGB BLD-MCNC: 15.5 G/DL (ref 13–17.7)
HOLD SPECIMEN: NORMAL
HOLD SPECIMEN: NORMAL
IMM GRANULOCYTES # BLD AUTO: 0.08 10*3/MM3 (ref 0–0.05)
IMM GRANULOCYTES NFR BLD AUTO: 0.5 % (ref 0–0.5)
INR PPP: 0.98 (ref 0.91–1.09)
LAB AP CASE REPORT: NORMAL
LIPASE SERPL-CCNC: 19 U/L (ref 13–60)
LYMPHOCYTES # BLD AUTO: 2.1 10*3/MM3 (ref 0.7–3.1)
LYMPHOCYTES NFR BLD AUTO: 12.3 % (ref 19.6–45.3)
MCH RBC QN AUTO: 29.6 PG (ref 26.6–33)
MCHC RBC AUTO-ENTMCNC: 34.7 G/DL (ref 31.5–35.7)
MCV RBC AUTO: 85.3 FL (ref 79–97)
MONOCYTES # BLD AUTO: 1.49 10*3/MM3 (ref 0.1–0.9)
MONOCYTES NFR BLD AUTO: 8.7 % (ref 5–12)
NEUTROPHILS NFR BLD AUTO: 13.01 10*3/MM3 (ref 1.7–7)
NEUTROPHILS NFR BLD AUTO: 76 % (ref 42.7–76)
NRBC BLD AUTO-RTO: 0 /100 WBC (ref 0–0.2)
NT-PROBNP SERPL-MCNC: 67.4 PG/ML (ref 0–900)
PATH REPORT.FINAL DX SPEC: NORMAL
PATH REPORT.GROSS SPEC: NORMAL
PLATELET # BLD AUTO: 263 10*3/MM3 (ref 140–450)
PMV BLD AUTO: 10.9 FL (ref 6–12)
POTASSIUM SERPL-SCNC: 4.1 MMOL/L (ref 3.5–5.2)
PROT SERPL-MCNC: 7.9 G/DL (ref 6–8.5)
PROTHROMBIN TIME: 12.6 SECONDS (ref 11.9–14.6)
RBC # BLD AUTO: 5.24 10*6/MM3 (ref 4.14–5.8)
SARS-COV-2 RNA PNL SPEC NAA+PROBE: NOT DETECTED
SODIUM SERPL-SCNC: 133 MMOL/L (ref 136–145)
TROPONIN T SERPL-MCNC: <0.01 NG/ML (ref 0–0.03)
WBC # BLD AUTO: 17.1 10*3/MM3 (ref 3.4–10.8)
WHOLE BLOOD HOLD SPECIMEN: NORMAL
WHOLE BLOOD HOLD SPECIMEN: NORMAL

## 2020-11-03 PROCEDURE — 83690 ASSAY OF LIPASE: CPT | Performed by: FAMILY MEDICINE

## 2020-11-03 PROCEDURE — 93005 ELECTROCARDIOGRAM TRACING: CPT | Performed by: FAMILY MEDICINE

## 2020-11-03 PROCEDURE — 87635 SARS-COV-2 COVID-19 AMP PRB: CPT | Performed by: FAMILY MEDICINE

## 2020-11-03 PROCEDURE — 80053 COMPREHEN METABOLIC PANEL: CPT

## 2020-11-03 PROCEDURE — 25010000002 ONDANSETRON PER 1 MG: Performed by: SPECIALIST

## 2020-11-03 PROCEDURE — 0 IOPAMIDOL PER 1 ML: Performed by: FAMILY MEDICINE

## 2020-11-03 PROCEDURE — 85610 PROTHROMBIN TIME: CPT | Performed by: FAMILY MEDICINE

## 2020-11-03 PROCEDURE — 93005 ELECTROCARDIOGRAM TRACING: CPT

## 2020-11-03 PROCEDURE — 85025 COMPLETE CBC W/AUTO DIFF WBC: CPT

## 2020-11-03 PROCEDURE — 86140 C-REACTIVE PROTEIN: CPT | Performed by: SPECIALIST

## 2020-11-03 PROCEDURE — 85730 THROMBOPLASTIN TIME PARTIAL: CPT | Performed by: FAMILY MEDICINE

## 2020-11-03 PROCEDURE — 74177 CT ABD & PELVIS W/CONTRAST: CPT

## 2020-11-03 PROCEDURE — 71045 X-RAY EXAM CHEST 1 VIEW: CPT

## 2020-11-03 PROCEDURE — 25010000002 MORPHINE PER 10 MG: Performed by: SPECIALIST

## 2020-11-03 PROCEDURE — 25010000002 PIPERACILLIN SOD-TAZOBACTAM PER 1 G: Performed by: SPECIALIST

## 2020-11-03 PROCEDURE — 94799 UNLISTED PULMONARY SVC/PX: CPT

## 2020-11-03 PROCEDURE — 84484 ASSAY OF TROPONIN QUANT: CPT

## 2020-11-03 PROCEDURE — 83880 ASSAY OF NATRIURETIC PEPTIDE: CPT

## 2020-11-03 PROCEDURE — 99284 EMERGENCY DEPT VISIT MOD MDM: CPT

## 2020-11-03 PROCEDURE — 93010 ELECTROCARDIOGRAM REPORT: CPT | Performed by: INTERNAL MEDICINE

## 2020-11-03 PROCEDURE — 94640 AIRWAY INHALATION TREATMENT: CPT

## 2020-11-03 PROCEDURE — 71275 CT ANGIOGRAPHY CHEST: CPT

## 2020-11-03 RX ORDER — SODIUM CHLORIDE 0.9 % (FLUSH) 0.9 %
10 SYRINGE (ML) INJECTION AS NEEDED
Status: DISCONTINUED | OUTPATIENT
Start: 2020-11-03 | End: 2020-11-07 | Stop reason: HOSPADM

## 2020-11-03 RX ORDER — SODIUM CHLORIDE 0.9 % (FLUSH) 0.9 %
10 SYRINGE (ML) INJECTION EVERY 12 HOURS SCHEDULED
Status: DISCONTINUED | OUTPATIENT
Start: 2020-11-03 | End: 2020-11-07 | Stop reason: HOSPADM

## 2020-11-03 RX ORDER — DEXTROSE, SODIUM CHLORIDE, SODIUM LACTATE, POTASSIUM CHLORIDE, AND CALCIUM CHLORIDE 5; .6; .31; .03; .02 G/100ML; G/100ML; G/100ML; G/100ML; G/100ML
50 INJECTION, SOLUTION INTRAVENOUS CONTINUOUS
Status: DISCONTINUED | OUTPATIENT
Start: 2020-11-03 | End: 2020-11-07 | Stop reason: HOSPADM

## 2020-11-03 RX ORDER — MORPHINE SULFATE 2 MG/ML
2 INJECTION, SOLUTION INTRAMUSCULAR; INTRAVENOUS
Status: DISCONTINUED | OUTPATIENT
Start: 2020-11-03 | End: 2020-11-07 | Stop reason: HOSPADM

## 2020-11-03 RX ORDER — ONDANSETRON 2 MG/ML
4 INJECTION INTRAMUSCULAR; INTRAVENOUS EVERY 6 HOURS PRN
Status: DISCONTINUED | OUTPATIENT
Start: 2020-11-03 | End: 2020-11-07 | Stop reason: HOSPADM

## 2020-11-03 RX ORDER — MORPHINE SULFATE 2 MG/ML
2 INJECTION, SOLUTION INTRAMUSCULAR; INTRAVENOUS
Status: DISCONTINUED | OUTPATIENT
Start: 2020-11-03 | End: 2020-11-03

## 2020-11-03 RX ORDER — IPRATROPIUM BROMIDE AND ALBUTEROL SULFATE 2.5; .5 MG/3ML; MG/3ML
3 SOLUTION RESPIRATORY (INHALATION)
Status: DISCONTINUED | OUTPATIENT
Start: 2020-11-03 | End: 2020-11-07 | Stop reason: HOSPADM

## 2020-11-03 RX ADMIN — IPRATROPIUM BROMIDE AND ALBUTEROL SULFATE 3 ML: 2.5; .5 SOLUTION RESPIRATORY (INHALATION) at 16:46

## 2020-11-03 RX ADMIN — MORPHINE SULFATE 4 MG: 4 INJECTION, SOLUTION INTRAMUSCULAR; INTRAVENOUS at 22:06

## 2020-11-03 RX ADMIN — SODIUM CHLORIDE, SODIUM LACTATE, POTASSIUM CHLORIDE, CALCIUM CHLORIDE AND DEXTROSE MONOHYDRATE 100 ML/HR: 5; 600; 310; 30; 20 INJECTION, SOLUTION INTRAVENOUS at 18:41

## 2020-11-03 RX ADMIN — ONDANSETRON HYDROCHLORIDE 4 MG: 2 SOLUTION INTRAMUSCULAR; INTRAVENOUS at 18:42

## 2020-11-03 RX ADMIN — MORPHINE SULFATE 4 MG: 4 INJECTION, SOLUTION INTRAMUSCULAR; INTRAVENOUS at 18:42

## 2020-11-03 RX ADMIN — IOPAMIDOL 100 ML: 755 INJECTION, SOLUTION INTRAVENOUS at 14:30

## 2020-11-03 RX ADMIN — IPRATROPIUM BROMIDE AND ALBUTEROL SULFATE 3 ML: 2.5; .5 SOLUTION RESPIRATORY (INHALATION) at 20:00

## 2020-11-03 RX ADMIN — TAZOBACTAM SODIUM AND PIPERACILLIN SODIUM 3.38 G: 375; 3 INJECTION, SOLUTION INTRAVENOUS at 18:43

## 2020-11-03 NOTE — ED PROVIDER NOTES
Subjective   Mr. Crandall is a 50-year-old gentleman who recently underwent surgery for complications associated with diverticulitis.  He presents today complaining of worsening shortness of breath and especially right shoulder pain although he does have some left shoulder pain.  He has some abdominal pain as well which is diffuse and he says it has not particularly exacerbated since he was discharged from the hospital yesterday.  He is not sure if he said fever but he does feel cold and hot and diaphoretic alternating.  He has had 2 mild nausea but no vomiting.          Review of Systems   Constitutional: Positive for chills and diaphoresis.   Respiratory: Positive for shortness of breath.    Gastrointestinal: Positive for abdominal pain.   All other systems reviewed and are negative.      Past Medical History:   Diagnosis Date   • Abnormal serum enzyme level    • Asthma    • Diverticulitis    • Elevated C-reactive protein (CRP)    • Hematuria    • Hyperlipidemia    • Internal hemorrhage    • Internal hemorrhoids    • Kidney cysts    • Peptic ulcer    • PONV (postoperative nausea and vomiting)        Allergies   Allergen Reactions   • Hydrocodone Itching   • Hydrocodone-Acetaminophen Itching     Reaction: Itching         Past Surgical History:   Procedure Laterality Date   • COLON RESECTION N/A 10/30/2020    Procedure: LAPAROSCOPIC ASSISTED SIGMOID COLECTOMY AND LAPAROSCOPIC TAKEDOWN OF SPLEENIC FLEXURE;  Surgeon: Gretchen Martinez MD;  Location: [facility];  Service: General;  Laterality: N/A;   • HEMORRHOIDECTOMY     • KNEE ARTHROSCOPY     • KNEE SURGERY Right     laceration repair   • NASAL SEPTUM SURGERY     • RHINOPLASTY     • ROTATOR CUFF REPAIR         Family History   Problem Relation Age of Onset   • Diverticulitis Maternal Aunt    • Diverticulitis Maternal Aunt    • Diverticulitis Maternal Great-Grandmother    • Colon cancer Neg Hx    • Colon polyps Neg Hx        Social History     Socioeconomic History   •  Marital status:      Spouse name: Not on file   • Number of children: Not on file   • Years of education: Not on file   • Highest education level: Not on file   Tobacco Use   • Smoking status: Never Smoker   • Smokeless tobacco: Never Used   Substance and Sexual Activity   • Alcohol use: Yes     Frequency: Never     Comment: occ   • Drug use: No   • Sexual activity: Defer           Objective   Physical Exam  Vitals signs and nursing note reviewed.   Constitutional:       Appearance: He is well-developed.   HENT:      Head: Normocephalic and atraumatic.      Right Ear: External ear normal.      Left Ear: External ear normal.      Nose: Nose normal.   Eyes:      Conjunctiva/sclera: Conjunctivae normal.   Neck:      Musculoskeletal: Normal range of motion and neck supple.   Cardiovascular:      Rate and Rhythm: Normal rate and regular rhythm.      Heart sounds: Normal heart sounds.   Pulmonary:      Effort: Pulmonary effort is normal.      Breath sounds: Normal breath sounds.   Abdominal:      General: Bowel sounds are normal.      Palpations: Abdomen is soft.   Musculoskeletal: Normal range of motion.   Skin:     General: Skin is warm and dry.      Capillary Refill: Capillary refill takes less than 2 seconds.   Neurological:      Mental Status: He is alert and oriented to person, place, and time.   Psychiatric:         Behavior: Behavior normal.         Thought Content: Thought content normal.         Judgment: Judgment normal.         Procedures           ED Course                                           MDM  Number of Diagnoses or Management Options     Amount and/or Complexity of Data Reviewed  Clinical lab tests: reviewed and ordered  Tests in the radiology section of CPT®: reviewed and ordered  Tests in the medicine section of CPT®: reviewed and ordered  Decide to obtain previous medical records or to obtain history from someone other than the patient: yes    Patient Progress  Patient progress:  stable      Final diagnoses:   Diverticulitis of large intestine with complication     The patient's work-up in terms of a shortness of of breath was essentially negative with a normal EKG, negative troponin, normal BNP and a CT angiogram of the chest which did not reveal any abnormalities.  I did do a CT of his abdomen and pelvis because of the ongoing abdominal pain and elevated white count and chills and diaphoresis which revealed some splenic flexure abnormalities which I discussed with Dr. Martinez.  He took a look at the CT scan and call back suggested we admit the patient under his care.  I discussed this with the patient and he is okay with that plan and is currently stable in the ED.       Benito Salinas MD  11/03/20 1178

## 2020-11-03 NOTE — PAYOR COMM NOTE
"REF:  [id number]    [facility]  [place],  [phone]  OR  FAX  [phone]     Epifanio Nuno (50 y.o. Male)     Date of Birth Social Security Number Address Home Phone MRN    1970  [address] [address] [phone] [id number]    Holiness Marital Status          Other        Admission Date Admission Type Admitting Provider Attending Provider Department, Room/Bed    10/30/20 Elective Gretchen Martinez MD  [facility] 3C, 391/1    Discharge Date Discharge Disposition Discharge Destination        11/2/2020 Home or Self Care              Attending Provider: (none)   Allergies: Hydrocodone, Hydrocodone-acetaminophen    Isolation: None   Infection: None   Code Status: Prior    Ht: 162 cm (63.78\")   Wt: 96.8 kg (213 lb 6.5 oz)    Admission Cmt: None   Principal Problem: None                Active Insurance as of 10/30/2020     Primary Coverage     Payor Plan Insurance Group Employer/Plan Group    [id number]     Payor Plan Address Payor Plan Phone Number Payor Plan Fax Number Effective Dates    [address] [phone]  1/1/2020 - None Entered    [facility] [id number]       Subscriber Name Subscriber Birth Date Member ID       EPIFANIO NUNO 1970 [id number]                 Emergency Contacts      (Rel.) Home Phone Work Phone Mobile Phone    Cody Nuno (Spouse) [phone] -- [phone]    VyTwyla (Mother) [phone] -- [phone]               Discharge Summary      Gretchen Martinez MD at 11/02/20 1429          Gretchen Martinez MD Discharge Summary    Date of Admission: 10/30/2020  Date of Discharge:  11/2/2020    Discharge Diagnosis: Diverticulitis    Presenting Problem/History of Present Illness    History and Physical as outlined in the chart    Hospital Course  Patient was admitted after undergoing the above operation.  Hospital course postoperatively was uneventful.  Patient will be " discharged to home.  See medication reconciliation for medications at discharge.    Procedures Performed  Procedure(s):  LAPAROSCOPIC ASSISTED SIGMOID COLECTOMY AND LAPAROSCOPIC TAKEDOWN OF SPLEENIC FLEXURE       Consults:   Consults     No orders found from 10/1/2020 to 10/31/2020.          Condition on Discharge:  stable      Discharge Disposition  Home or Self Care    Discharge Medications     Discharge Medications      New Medications      Instructions Start Date   oxyCODONE-acetaminophen  MG per tablet  Commonly known as: PERCOCET   1 tablet, Oral, Every 4 Hours PRN         Continue These Medications      Instructions Start Date   albuterol sulfate  (90 Base) MCG/ACT inhaler  Commonly known as: PROVENTIL HFA;VENTOLIN HFA;PROAIR HFA   2 puffs, Inhalation, Every 4 Hours PRN      ciprofloxacin 500 MG tablet  Commonly known as: CIPRO   500 mg, Oral, Daily      erythromycin 500 MG EC tablet  Commonly known as: AILYN-TAB   500 mg, Oral, Once, Instructions to take day before surgery       metroNIDAZOLE 500 MG tablet  Commonly known as: FLAGYL   500 mg, Oral, 3 Times Daily      neomycin 500 MG tablet  Commonly known as: MYCIFRADIN   1,000 mg, Oral, Once, Instructions on taking day before surgery              Discharge Diet:     Activity at Discharge:     Follow-up Appointments  No future appointments.  Additional Instructions for the Follow-ups that You Need to Schedule     Discharge Follow-up with Specified Provider: me; 3 Weeks   As directed      To: me    Follow Up: 3 Weeks               Test Results Pending at Discharge  Pending Labs     Order Current Status    Tissue Pathology Exam In process           Gretchen Martinez MD  11/02/20  14:29 CST    Time: Time spent at discharge 30 minutes             Electronically signed by Gretchen Martinez MD at 11/02/20 5112

## 2020-11-04 LAB
ANION GAP SERPL CALCULATED.3IONS-SCNC: 9 MMOL/L (ref 5–15)
BASOPHILS # BLD AUTO: 0.05 10*3/MM3 (ref 0–0.2)
BASOPHILS NFR BLD AUTO: 0.4 % (ref 0–1.5)
BUN SERPL-MCNC: 14 MG/DL (ref 6–20)
BUN/CREAT SERPL: 20 (ref 7–25)
CALCIUM SPEC-SCNC: 9.3 MG/DL (ref 8.6–10.5)
CHLORIDE SERPL-SCNC: 100 MMOL/L (ref 98–107)
CO2 SERPL-SCNC: 28 MMOL/L (ref 22–29)
CREAT SERPL-MCNC: 0.7 MG/DL (ref 0.76–1.27)
DEPRECATED RDW RBC AUTO: 43 FL (ref 37–54)
EOSINOPHIL # BLD AUTO: 0.71 10*3/MM3 (ref 0–0.4)
EOSINOPHIL NFR BLD AUTO: 5.3 % (ref 0.3–6.2)
ERYTHROCYTE [DISTWIDTH] IN BLOOD BY AUTOMATED COUNT: 13.2 % (ref 12.3–15.4)
ERYTHROCYTE [SEDIMENTATION RATE] IN BLOOD: 70 MM/HR (ref 0–15)
GFR SERPL CREATININE-BSD FRML MDRD: 118 ML/MIN/1.73
GLUCOSE SERPL-MCNC: 126 MG/DL (ref 65–99)
HCT VFR BLD AUTO: 44.4 % (ref 37.5–51)
HGB BLD-MCNC: 14.5 G/DL (ref 13–17.7)
IMM GRANULOCYTES # BLD AUTO: 0.07 10*3/MM3 (ref 0–0.05)
IMM GRANULOCYTES NFR BLD AUTO: 0.5 % (ref 0–0.5)
LYMPHOCYTES # BLD AUTO: 2.09 10*3/MM3 (ref 0.7–3.1)
LYMPHOCYTES NFR BLD AUTO: 15.5 % (ref 19.6–45.3)
MCH RBC QN AUTO: 29.2 PG (ref 26.6–33)
MCHC RBC AUTO-ENTMCNC: 32.7 G/DL (ref 31.5–35.7)
MCV RBC AUTO: 89.3 FL (ref 79–97)
MONOCYTES # BLD AUTO: 1.36 10*3/MM3 (ref 0.1–0.9)
MONOCYTES NFR BLD AUTO: 10.1 % (ref 5–12)
NEUTROPHILS NFR BLD AUTO: 68.2 % (ref 42.7–76)
NEUTROPHILS NFR BLD AUTO: 9.2 10*3/MM3 (ref 1.7–7)
NRBC BLD AUTO-RTO: 0 /100 WBC (ref 0–0.2)
PLATELET # BLD AUTO: 245 10*3/MM3 (ref 140–450)
PMV BLD AUTO: 11.1 FL (ref 6–12)
POTASSIUM SERPL-SCNC: 3.9 MMOL/L (ref 3.5–5.2)
QT INTERVAL: 342 MS
QTC INTERVAL: 432 MS
RBC # BLD AUTO: 4.97 10*6/MM3 (ref 4.14–5.8)
SODIUM SERPL-SCNC: 137 MMOL/L (ref 136–145)
WBC # BLD AUTO: 13.48 10*3/MM3 (ref 3.4–10.8)

## 2020-11-04 PROCEDURE — 85651 RBC SED RATE NONAUTOMATED: CPT | Performed by: SPECIALIST

## 2020-11-04 PROCEDURE — 80048 BASIC METABOLIC PNL TOTAL CA: CPT | Performed by: SPECIALIST

## 2020-11-04 PROCEDURE — 36415 COLL VENOUS BLD VENIPUNCTURE: CPT | Performed by: SPECIALIST

## 2020-11-04 PROCEDURE — 25010000002 PIPERACILLIN SOD-TAZOBACTAM PER 1 G: Performed by: SPECIALIST

## 2020-11-04 PROCEDURE — 94799 UNLISTED PULMONARY SVC/PX: CPT

## 2020-11-04 PROCEDURE — 25010000002 MORPHINE SULFATE (PF) 2 MG/ML SOLUTION: Performed by: SPECIALIST

## 2020-11-04 PROCEDURE — 85025 COMPLETE CBC W/AUTO DIFF WBC: CPT | Performed by: SPECIALIST

## 2020-11-04 PROCEDURE — 25010000002 KETOROLAC TROMETHAMINE PER 15 MG: Performed by: SPECIALIST

## 2020-11-04 PROCEDURE — 25010000002 MORPHINE PER 10 MG: Performed by: SPECIALIST

## 2020-11-04 RX ORDER — KETOROLAC TROMETHAMINE 30 MG/ML
15 INJECTION, SOLUTION INTRAMUSCULAR; INTRAVENOUS EVERY 8 HOURS
Status: DISCONTINUED | OUTPATIENT
Start: 2020-11-04 | End: 2020-11-07 | Stop reason: HOSPADM

## 2020-11-04 RX ADMIN — MORPHINE SULFATE 4 MG: 4 INJECTION, SOLUTION INTRAMUSCULAR; INTRAVENOUS at 17:21

## 2020-11-04 RX ADMIN — MORPHINE SULFATE 4 MG: 4 INJECTION, SOLUTION INTRAMUSCULAR; INTRAVENOUS at 14:26

## 2020-11-04 RX ADMIN — IPRATROPIUM BROMIDE AND ALBUTEROL SULFATE 3 ML: 2.5; .5 SOLUTION RESPIRATORY (INHALATION) at 06:49

## 2020-11-04 RX ADMIN — MORPHINE SULFATE 4 MG: 4 INJECTION, SOLUTION INTRAMUSCULAR; INTRAVENOUS at 10:50

## 2020-11-04 RX ADMIN — TAZOBACTAM SODIUM AND PIPERACILLIN SODIUM 3.38 G: 375; 3 INJECTION, SOLUTION INTRAVENOUS at 09:09

## 2020-11-04 RX ADMIN — IPRATROPIUM BROMIDE AND ALBUTEROL SULFATE 3 ML: 2.5; .5 SOLUTION RESPIRATORY (INHALATION) at 20:25

## 2020-11-04 RX ADMIN — TAZOBACTAM SODIUM AND PIPERACILLIN SODIUM 3.38 G: 375; 3 INJECTION, SOLUTION INTRAVENOUS at 17:11

## 2020-11-04 RX ADMIN — MORPHINE SULFATE 2 MG: 2 INJECTION, SOLUTION INTRAMUSCULAR; INTRAVENOUS at 21:59

## 2020-11-04 RX ADMIN — MORPHINE SULFATE 4 MG: 4 INJECTION, SOLUTION INTRAMUSCULAR; INTRAVENOUS at 07:49

## 2020-11-04 RX ADMIN — KETOROLAC TROMETHAMINE 15 MG: 30 INJECTION, SOLUTION INTRAMUSCULAR at 21:02

## 2020-11-04 RX ADMIN — SODIUM CHLORIDE, SODIUM LACTATE, POTASSIUM CHLORIDE, CALCIUM CHLORIDE AND DEXTROSE MONOHYDRATE 100 ML/HR: 5; 600; 310; 30; 20 INJECTION, SOLUTION INTRAVENOUS at 02:10

## 2020-11-04 RX ADMIN — MORPHINE SULFATE 4 MG: 4 INJECTION, SOLUTION INTRAMUSCULAR; INTRAVENOUS at 02:03

## 2020-11-04 RX ADMIN — IPRATROPIUM BROMIDE AND ALBUTEROL SULFATE 3 ML: 2.5; .5 SOLUTION RESPIRATORY (INHALATION) at 10:32

## 2020-11-04 RX ADMIN — SODIUM CHLORIDE, SODIUM LACTATE, POTASSIUM CHLORIDE, CALCIUM CHLORIDE AND DEXTROSE MONOHYDRATE 100 ML/HR: 5; 600; 310; 30; 20 INJECTION, SOLUTION INTRAVENOUS at 15:44

## 2020-11-04 RX ADMIN — TAZOBACTAM SODIUM AND PIPERACILLIN SODIUM 3.38 G: 375; 3 INJECTION, SOLUTION INTRAVENOUS at 02:03

## 2020-11-04 RX ADMIN — IPRATROPIUM BROMIDE AND ALBUTEROL SULFATE 3 ML: 2.5; .5 SOLUTION RESPIRATORY (INHALATION) at 14:37

## 2020-11-04 RX ADMIN — KETOROLAC TROMETHAMINE 15 MG: 30 INJECTION, SOLUTION INTRAMUSCULAR at 13:11

## 2020-11-04 RX ADMIN — SODIUM CHLORIDE, PRESERVATIVE FREE 10 ML: 5 INJECTION INTRAVENOUS at 09:09

## 2020-11-04 NOTE — PAYOR COMM NOTE
"Epifanio Nuno (50 y.o. Male) [id number]   Additional clinical /  todays  Progress note    Please review   [facility] phone    Fax        Date of Birth Social Security Number Address Home Phone MRN    1970  [address] [place] [address] [phone] [id number]    [employer] Mar[employer] Status          Other        Admission Date Admission Type Admitting Provider Attending Provider Department, Room/Bed    11/3/20 Emergency rGetchen Martinez MD Tyrrell, Dana R, MD [facility] 3C, 362/1    Discharge Date Discharge Disposition Discharge Destination                       Attending Provider: Gretchen Martinez MD    Allergies: Hydrocodone, Hydrocodone-acetaminophen    Isolation: None   Infection: None   Code Status: CPR    Ht: 160 cm (63\")   Wt: 91.6 kg (202 lb)    Admission Cmt: None   Principal Problem: None                Active Insurance as of 11/3/2020     Primary Coverage     Payor Plan Insurance Group Employer/Plan Group    [id number]     Payor Plan Address Payor Plan Phone Number Payor Plan Fax Number Effective Dates    [address] [phone]  1/1/2020 - None Entered    [facility] [id number]       Subscriber Name Subscriber Birth Date Member ID       EPIFANIO NUNO 1970 [id number]                 Emergency Contacts      (Rel.) Home Phone Work Phone Mobile Phone    Cody Nuno (Spouse) [phone] -- [phone]    Twyla Mcclellan (Mother) [phone] -- [phone]               Physician Progress Notes (last 24 hours) (Notes from 11/03/20 1405 through 11/04/20 1405)      Gretchen Martinez MD at 11/04/20 1119          Gretchen Martinez MD Progress Note     LOS: 1 day   Patient Care Team:  Ivone Trujillo APRN as PCP - General (Family Medicine)  Epifanio Sandhu MD as Consulting Physician (Gastroenterology)        Subjective     He is feeling better.  Still having shoulder pain " but it is abdomen pain is much improved.  He is breathing better.  Still feels some gassy pain.  Passing a lot of flatus.  No vomiting afebrile    Objective     Vital Signs  Temp:  [97.6 °F (36.4 °C)-98.5 °F (36.9 °C)] 97.7 °F (36.5 °C)  Heart Rate:  [] 84  Resp:  [15-18] 16  BP: (116-152)/(67-98) 124/70    Intake & Output (last 3 days)       11/01 0701 - 11/02 0700 11/02 0701 - 11/03 0700 11/03 0701 - 11/04 0700 11/04 0701 - 11/05 0700    Urine (mL/kg/hr)   300 400 (1)    Total Output   300 400    Net   -300 -400                  Physical Exam:     General Appearance:    Alert, cooperative, in no acute distress   Lungs:     respirations regular, even and unlabored    Heart:    Regular rhythm and normal rate, normal S1 and S2, no            murmur, no gallop, no rub   Chest Wall:    No abnormalities observed   Abdomen:      Softly distended especially on the right side.  He has had mild tenderness but greatly improved from yesterday.  The dressing still intact.   Extremities: No edema,    Results Review:     I reviewed the patient's new clinical results.    Lab Results (last 72 hours)     Procedure Component Value Units Date/Time    CBC & Differential [[id number]]  (Abnormal) Collected: 11/04/20 0354    Specimen: Blood Updated: 11/04/20 0542    Narrative:      The following orders were created for panel order CBC & Differential.  Procedure                               Abnormality         Status                     ---------                               -----------         ------                     CBC Auto Differential[[id number]]        Abnormal            Final result                 Please view results for these tests on the individual orders.    CBC Auto Differential [[id number]]  (Abnormal) Collected: 11/04/20 0354    Specimen: Blood Updated: 11/04/20 0542     WBC 13.48 10*3/mm3      RBC 4.97 10*6/mm3      Hemoglobin 14.5 g/dL      Hematocrit 44.4 %      MCV 89.3 fL      MCH 29.2 pg      MCHC 32.7 g/dL         RDW 13.2 %      RDW-SD 43.0 fl      MPV 11.1 fL      Platelets 245 10*3/mm3      Neutrophil % 68.2 %      Lymphocyte % 15.5 %      Monocyte % 10.1 %      Eosinophil % 5.3 %      Basophil % 0.4 %      Immature Grans % 0.5 %      Neutrophils, Absolute 9.20 10*3/mm3      Lymphocytes, Absolute 2.09 10*3/mm3      Monocytes, Absolute 1.36 10*3/mm3      Eosinophils, Absolute 0.71 10*3/mm3      Basophils, Absolute 0.05 10*3/mm3      Immature Grans, Absolute 0.07 10*3/mm3      nRBC 0.0 /100 WBC     Sedimentation Rate [[id number]]  (Abnormal) Collected: 11/04/20 0354    Specimen: Blood Updated: 11/04/20 0526     Sed Rate 70 mm/hr     Basic Metabolic Panel [[id number]]  (Abnormal) Collected: 11/04/20 0354    Specimen: Blood Updated: 11/04/20 0518     Glucose 126 mg/dL      BUN 14 mg/dL      Creatinine 0.70 mg/dL      Sodium 137 mmol/L      Potassium 3.9 mmol/L      Chloride 100 mmol/L      CO2 28.0 mmol/L      Calcium 9.3 mg/dL      eGFR Non African Amer 118 mL/min/1.73      BUN/Creatinine Ratio 20.0     Anion Gap 9.0 mmol/L     Narrative:      GFR Normal >60  Chronic Kidney Disease <60  Kidney Failure <15      C-reactive Protein [[id number]]  (Abnormal) Collected: 11/03/20 1201    Specimen: Blood Updated: 11/03/20 1829     C-Reactive Protein 10.90 mg/dL     COVID PRE-OP / PRE-PROCEDURE SCREENING ORDER (NO ISOLATION) - Swab, Nasal Cavity [[id number]]  (Normal) Collected: 11/03/20 1529    Specimen: Swab from Nasal Cavity Updated: 11/03/20 1711    Narrative:      The following orders were created for panel order COVID PRE-OP / PRE-PROCEDURE SCREENING ORDER (NO ISOLATION) - Swab, Nasal Cavity.  Procedure                               Abnormality         Status                     ---------                               -----------         ------                     COVID-19,[facility] IN-NAVI...[[id number]]  Normal              Final result                 Please view results for these tests on the individual orders.    COVID-19,[facility] IN-HOUSE,Nasal Swab No Transport Media 3-4 HR TAT - Swab, Nasal Cavity [[id number]]  (Normal) Collected: 11/03/20 1529    Specimen: Swab from Nasal Cavity Updated: 11/03/20 1711     COVID19 Not Detected    Narrative:      Fact sheet for providers: https://www.fda.gov/media/838357/download     Fact sheet for patients: https://www.fda.gov/media/256305/download    [place] Draw [[id number]] Collected: 11/03/20 1201    Specimen: Blood Updated: 11/03/20 1315    Narrative:      The following orders were created for panel order [place] Draw.  Procedure                               Abnormality         Status                     ---------                               -----------         ------                     Light Blue Top[[id number]]                                   Final result               Green Top (Gel)[[id number]]                                  Final result               Lavender Top[[id number]]                                     Final result               Red Top[[id number]]                                          Final result                 Please view results for these tests on the individual orders.    Light Blue Top [[id number]] Collected: 11/03/20 1201    Specimen: Blood Updated: 11/03/20 1315     Extra Tube hold for add-on     Comment: Auto resulted       Green Top (Gel) [[id number]] Collected: 11/03/20 1201    Specimen: Blood Updated: 11/03/20 1315     Extra Tube Hold for add-ons.     Comment: Auto resulted.       Red Top [[id number]] Collected: 11/03/20 1201    Specimen: Blood Updated: 11/03/20 1315     Extra Tube Hold for add-ons.     Comment: Auto resulted.       Lavender Top [[id number]] Collected: 11/03/20 1201    Specimen: Blood Updated: 11/03/20 1315     Extra Tube hold for add-on     Comment: Auto resulted       Protime-INR [[id number]]  (Normal) Collected: 11/03/20 1201    Specimen: Blood Updated: 11/03/20 1302     Protime 12.6 Seconds      INR 0.98    aPTT [[id number]]  (Normal) Collected:  11/03/20 1201    Specimen: Blood Updated: 11/03/20 1302     PTT 32.0 seconds     Lipase [[id number]]  (Normal) Collected: 11/03/20 1201    Specimen: Blood Updated: 11/03/20 1302     Lipase 19 U/L     Comprehensive Metabolic Panel [[id number]]  (Abnormal) Collected: 11/03/20 1201    Specimen: Blood Updated: 11/03/20 1233     Glucose 125 mg/dL      BUN 12 mg/dL      Creatinine 0.65 mg/dL      Sodium 133 mmol/L      Potassium 4.1 mmol/L      Comment: Slight hemolysis detected by analyzer. Results may be affected.        Chloride 96 mmol/L      CO2 27.0 mmol/L      Calcium 9.7 mg/dL      Total Protein 7.9 g/dL      Albumin 4.30 g/dL      ALT (SGPT) 33 U/L      AST (SGOT) 25 U/L      Alkaline Phosphatase 93 U/L      Total Bilirubin 0.9 mg/dL      eGFR Non African Amer 129 mL/min/1.73      Globulin 3.6 gm/dL      A/G Ratio 1.2 g/dL      BUN/Creatinine Ratio 18.5     Anion Gap 10.0 mmol/L     Narrative:      GFR Normal >60  Chronic Kidney Disease <60  Kidney Failure <15      BNP [[id number]]  (Normal) Collected: 11/03/20 1201    Specimen: Blood Updated: 11/03/20 1233     proBNP 67.4 pg/mL     Narrative:      Among patients with dyspnea, NT-proBNP is highly sensitive for the detection of acute congestive heart failure. In addition NT-proBNP of <300 pg/ml effectively rules out acute congestive heart failure with 99% negative predictive value.    Results may be falsely decreased if patient taking Biotin.      Troponin [[id number]]  (Normal) Collected: 11/03/20 1201    Specimen: Blood Updated: 11/03/20 1233     Troponin T <0.010 ng/mL     Narrative:      Troponin T Reference Range:  <= 0.03 ng/mL-   Negative for AMI  >0.03 ng/mL-     Abnormal for myocardial necrosis.  Clinicians would have to utilize clinical acumen, EKG, Troponin and serial changes to determine if it is an Acute Myocardial Infarction or myocardial injury due to an underlying chronic condition.       Results may be falsely decreased if patient taking Biotin.       CBC & Differential [[id number]]  (Abnormal) Collected: 11/03/20 1201    Specimen: Blood Updated: 11/03/20 1213    Narrative:      The following orders were created for panel order CBC & Differential.  Procedure                               Abnormality         Status                     ---------                               -----------         ------                     CBC Auto Differential[[id number]]        Abnormal            Final result                 Please view results for these tests on the individual orders.    CBC Auto Differential [[id number]]  (Abnormal) Collected: 11/03/20 1201    Specimen: Blood Updated: 11/03/20 1213     WBC 17.10 10*3/mm3      RBC 5.24 10*6/mm3      Hemoglobin 15.5 g/dL      Hematocrit 44.7 %      MCV 85.3 fL      MCH 29.6 pg      MCHC 34.7 g/dL      RDW 13.2 %      RDW-SD 40.7 fl      MPV 10.9 fL      Platelets 263 10*3/mm3      Neutrophil % 76.0 %      Lymphocyte % 12.3 %      Monocyte % 8.7 %      Eosinophil % 2.2 %      Basophil % 0.3 %      Immature Grans % 0.5 %      Neutrophils, Absolute 13.01 10*3/mm3      Lymphocytes, Absolute 2.10 10*3/mm3      Monocytes, Absolute 1.49 10*3/mm3      Eosinophils, Absolute 0.37 10*3/mm3      Basophils, Absolute 0.05 10*3/mm3      Immature Grans, Absolute 0.08 10*3/mm3      nRBC 0.0 /100 WBC         Imaging Results (Last 72 Hours)     Procedure Component Value Units Date/Time    CT Angiogram Chest [[id number]] Collected: 11/03/20 1455     Updated: 11/03/20 1500    Narrative:      EXAMINATION: CT ANGIOGRAM CHEST-      11/3/2020 2:26 PM CST     HISTORY: Recent abdominal laparoscopic surgery now with shortness of  breath and bilateral upper chest pain     In order to have a CT radiation dose as low as reasonably achievable  Automated Exposure Control was utilized for adjustment of the mA and/or  KV according to patient size.     DLP in mGycm= 269.     CT angiography protocol.   CT imaging with bolus IV contrast injection.   Under concurrent  supervision axial, sagittal, coronal, and  three-dimensional data sets were constructed.     Normal heart size.  Normal thoracic aorta with no aneurysm or dissection.     Normal and symmetric pulmonary arteries.  No pulmonary embolism.     Mild bibasilar atelectasis.  Upper lobes are clear.  No pneumothorax or pleural effusion.     Postoperative free air noted within within the upper abdomen.  Abdomen/pelvis CT dictated separately.     Summary:  1. No pulmonary embolism.  2. Mild basilar atelectasis.                                   This report was finalized on 11/03/2020 14:57 by Dr. Deon Hernandez MD.    CT Abdomen Pelvis With Contrast [[id number]] Collected: 11/03/20 1439     Updated: 11/03/20 1458    Narrative:      EXAMINATION: CT ABDOMEN PELVIS W CONTRAST-      11/3/2020 2:26 PM CST     HISTORY: Recent diverticulitis to surgery now with elevated white count  and abdominal pain     In order to have a CT radiation dose as low as reasonably achievable  Automated Exposure Control was utilized for adjustment of the mA and/or  KV according to patient size.     DLP in mGycm= 410.     Abdomen pelvis CT with IV contrast injection.     This patient is 4 days status post sigmoid colectomy for diverticulitis.     Intraperitoneal air.  Scattered foci of air within the mesentery.     Low sigmoid anastomotic line noted.  No pelvic abscess.     There is residual sigmoid colon wall thickening with surrounding  inflammation in the left side of the pelvis.     There is focal splenic flexure colonic wall thickening with some  adjacent mesenteric air and edema. (Axial images 25 through 33)  The pericolonic edema at left upper quadrant represents inflammatory  change which may be due to focal colitis or diverticulitis.  Focal mesenteric air in this location is compatible with small contained  perforation. There is no drainable abscess collection at this site.     No small bowel obstruction.  The appendix is visualized. There is no  sign of appendicitis.     Heart size is normal.  Mild atelectasis at the lung bases.  No sign of pneumonia.     Normal liver and gallbladder.  Normal pancreas and spleen.  Normal and symmetric adrenal glands.  Bilateral renal cysts.  Right renal cyst = 27 x 24 mm.  Cysts within the left kidney measure up to 19 x 13 mm.     Normal abdominal aorta.     Summary:  1. Postsurgical changes with intra-abdominal air.  2. Inflammatory change and sigmoid colon wall thickening at the left  lower quadrant. CT appearance of residual diverticulitis.  2. No drainable abscess collection.  3. Focal inflammatory change and focal extraluminal air associated with  focal colonic wall thickening at the splenic flexure. This appearance is  compatible with acute inflammatory change and localized perforation.  There is no abscess at this site.  4. Normal appearance of the rectosigmoid anastomosis.                                   This report was finalized on 11/03/2020 14:55 by Dr. Deon Hernandez MD.    XR Chest 1 View [[id number]] Collected: 11/03/20 1358     Updated: 11/03/20 1403    Narrative:      EXAMINATION: XR CHEST 1 VW-     11/3/2020 1:30 PM CST     HISTORY: Short of breath. Shoulder pain.     1 view chest x-ray with no comparison.     Heart size is normal.  The mediastinum is within normal limits.      The lungs are normally expanded with no pneumonia or pneumothorax.     No congestive failure changes.                                                                       Impression:      1. No acute disease.           This report was finalized on 11/03/2020 14:00 by Dr. Deon Hernandez MD.              Assessment/Plan       Diverticulitis      Keep n.p.o. ambulate trial of Toradol.  His abdominal exam is significantly improved from yesterday which is encouraging his white count is down a little bit as well.  We again discussed the possible etiology of this being injury from the time of surgery or inflammation from dissection.   Continue antibiotics      Gretchen Martinez MD  11/04/20  11:19 CST        Electronically signed by Gretchen Martinez MD at 11/04/20 8053

## 2020-11-04 NOTE — H&P
Gretchen Martinez MD  H&P    Patient Care Team:  Ivone Trujillo APRN as PCP - General (Family Medicine)  Epifanio Sandhu MD as Consulting Physician (Gastroenterology)    Chief complaint abdominal pain shortness of breath    Subjective     Epifanio Crandall  is a 50 y.o. male presents with patient underwent laparoscopic mobilization of splenic flexure and sigmoid colectomy 5 days ago.  He did well and was discharged home yesterday feeling great.  He states last night he began having some shortness of breath and woke up this morning with very shallow inspirations.  Is also complaining of shoulder pain mostly on the right side but some on the left side.  Had a normal bowel movement this morning.  No nausea no vomiting he denied any fevers.  He called the office and I spoke to his mother who I told to bring him to the emergency room for evaluation.  In the ER he had a CTA to rule out pulmonary embolism which was negative.  An abdominal CT was performed which showed air which certainly could be residual from his surgery but he also had some thickening of the colon at the splenic flexure with some contained air in the region.  Is also noted to have leukocytosis..      Review of Systems   Pertinent items are noted in HPI, all other systems reviewed and negative    History  Past Medical History:   Diagnosis Date   • Abnormal serum enzyme level    • Asthma    • Diverticulitis    • Elevated C-reactive protein (CRP)    • Hematuria    • Hyperlipidemia    • Internal hemorrhage    • Internal hemorrhoids    • Kidney cysts    • Peptic ulcer    • PONV (postoperative nausea and vomiting)      Past Surgical History:   Procedure Laterality Date   • COLON RESECTION N/A 10/30/2020    Procedure: LAPAROSCOPIC ASSISTED SIGMOID COLECTOMY AND LAPAROSCOPIC TAKEDOWN OF SPLEENIC FLEXURE;  Surgeon: Gretchen Martinez MD;  Location: [facility];  Service: General;  Laterality: N/A;   • HEMORRHOIDECTOMY     • KNEE ARTHROSCOPY     • KNEE SURGERY Right      laceration repair   • NASAL SEPTUM SURGERY     • RHINOPLASTY     • ROTATOR CUFF REPAIR       Family History   Problem Relation Age of Onset   • Diverticulitis Maternal Aunt    • Diverticulitis Maternal Aunt    • Diverticulitis Maternal Great-Grandmother    • Colon cancer Neg Hx    • Colon polyps Neg Hx      Social History     Tobacco Use   • Smoking status: Never Smoker   • Smokeless tobacco: Never Used   Substance Use Topics   • Alcohol use: Yes     Frequency: Never     Comment: occ   • Drug use: No     Medications Prior to Admission   Medication Sig Dispense Refill Last Dose   • albuterol sulfate  (90 Base) MCG/ACT inhaler Inhale 2 puffs Every 4 (Four) Hours As Needed.      • oxyCODONE-acetaminophen (PERCOCET)  MG per tablet Take 1 tablet by mouth Every 4 (Four) Hours As Needed for Moderate Pain  for up to 30 doses. 30 tablet 0      Allergies:  Hydrocodone and Hydrocodone-acetaminophen    Objective     Vital Signs  Temp:  [98.3 °F (36.8 °C)] 98.3 °F (36.8 °C)  Heart Rate:  [] 100  Resp:  [15-18] 15  BP: (126-152)/(85-98) 137/96    Physical Exam:      General Appearance:    Alert, cooperative, in no acute distress   Head:    Normocephalic, without obvious abnormality, atraumatic   Eyes:            Lids and lashes normal, conjunctivae and sclerae normal, no   icterus, no pallor, corneas clear, PERRLA   Ears:    Ears appear intact with no abnormalities noted   Neck:   No adenopathy, supple, trachea midline   Back:     No kyphosis present, no scoliosis present, no skin lesions,      erythema or scars, no tenderness to percussion or                   palpation,   range of motion normal   Lungs:     Clear to auscultation,respirations regular, even and                  unlabored    Heart:    Regular rhythm and normal rate, normal S1 and S2, no            murmur, no gallop, no rub, no click   Chest Wall:    No abnormalities observed   Abdomen:    His wounds are intact.  He is slightly distended and  tender in the left upper quadrant.  He has hypoactive but positive bowel sounds   Rectal:     Deferred   Extremities:   Moves all extremities well, no edema, no cyanosis, no             redness   Pulses:   Pulses palpable and equal bilaterally   Skin:   No bleeding, bruising or rash   Lymph nodes:   No palpable adenopathy   Neurologic:   No focal deficits       Results Review:      Lab Results (last 72 hours)     Procedure Component Value Units Date/Time    C-reactive Protein [[id number]] Collected: 11/03/20 1201    Specimen: Blood Updated: 11/03/20 1809    COVID PRE-OP / PRE-PROCEDURE SCREENING ORDER (NO ISOLATION) - Swab, Nasal Cavity [[id number]]  (Normal) Collected: 11/03/20 1529    Specimen: Swab from Nasal Cavity Updated: 11/03/20 1711    Narrative:      The following orders were created for panel order COVID PRE-OP / PRE-PROCEDURE SCREENING ORDER (NO ISOLATION) - Swab, Nasal Cavity.  Procedure                               Abnormality         Status                     ---------                               -----------         ------                     COVID-19,[facility] IN-NAVI...[[id number]]  Normal              Final result                 Please view results for these tests on the individual orders.    COVID-19,[facility] IN-HOUSE,Nasal Swab No Transport Media 3-4 HR TAT - Swab, Nasal Cavity [[id number]]  (Normal) Collected: 11/03/20 1529    Specimen: Swab from Nasal Cavity Updated: 11/03/20 1711     COVID19 Not Detected    Narrative:      Fact sheet for providers: https://www.fda.gov/media/533213/download     Fact sheet for patients: https://www.fda.gov/media/758943/download    Conception Draw [[id number]] Collected: 11/03/20 1201    Specimen: Blood Updated: 11/03/20 1315    Narrative:      The following orders were created for panel order Conception Draw.  Procedure                               Abnormality         Status                     ---------                               -----------         ------                      Light Blue Top[[id number]]                                   Final result               Green Top (Gel)[[id number]]                                  Final result               Lavender Top[[id number]]                                     Final result               Red Top[[id number]]                                          Final result                 Please view results for these tests on the individual orders.    Light Blue Top [[id number]] Collected: 11/03/20 1201    Specimen: Blood Updated: 11/03/20 1315     Extra Tube hold for add-on     Comment: Auto resulted       Green Top (Gel) [[id number]] Collected: 11/03/20 1201    Specimen: Blood Updated: 11/03/20 1315     Extra Tube Hold for add-ons.     Comment: Auto resulted.       Red Top [[id number]] Collected: 11/03/20 1201    Specimen: Blood Updated: 11/03/20 1315     Extra Tube Hold for add-ons.     Comment: Auto resulted.       Lavender Top [[id number]] Collected: 11/03/20 1201    Specimen: Blood Updated: 11/03/20 1315     Extra Tube hold for add-on     Comment: Auto resulted       Protime-INR [[id number]]  (Normal) Collected: 11/03/20 1201    Specimen: Blood Updated: 11/03/20 1302     Protime 12.6 Seconds      INR 0.98    aPTT [[id number]]  (Normal) Collected: 11/03/20 1201    Specimen: Blood Updated: 11/03/20 1302     PTT 32.0 seconds     Lipase [[id number]]  (Normal) Collected: 11/03/20 1201    Specimen: Blood Updated: 11/03/20 1302     Lipase 19 U/L     Comprehensive Metabolic Panel [[id number]]  (Abnormal) Collected: 11/03/20 1201    Specimen: Blood Updated: 11/03/20 1233     Glucose 125 mg/dL      BUN 12 mg/dL      Creatinine 0.65 mg/dL      Sodium 133 mmol/L      Potassium 4.1 mmol/L      Comment: Slight hemolysis detected by analyzer. Results may be affected.        Chloride 96 mmol/L      CO2 27.0 mmol/L      Calcium 9.7 mg/dL      Total Protein 7.9 g/dL      Albumin 4.30 g/dL      ALT (SGPT) 33 U/L      AST (SGOT) 25 U/L      Alkaline Phosphatase 93  U/L      Total Bilirubin 0.9 mg/dL      eGFR Non African Amer 129 mL/min/1.73      Globulin 3.6 gm/dL      A/G Ratio 1.2 g/dL      BUN/Creatinine Ratio 18.5     Anion Gap 10.0 mmol/L     Narrative:      GFR Normal >60  Chronic Kidney Disease <60  Kidney Failure <15      BNP [[id number]]  (Normal) Collected: 11/03/20 1201    Specimen: Blood Updated: 11/03/20 1233     proBNP 67.4 pg/mL     Narrative:      Among patients with dyspnea, NT-proBNP is highly sensitive for the detection of acute congestive heart failure. In addition NT-proBNP of <300 pg/ml effectively rules out acute congestive heart failure with 99% negative predictive value.    Results may be falsely decreased if patient taking Biotin.      Troponin [[id number]]  (Normal) Collected: 11/03/20 1201    Specimen: Blood Updated: 11/03/20 1233     Troponin T <0.010 ng/mL     Narrative:      Troponin T Reference Range:  <= 0.03 ng/mL-   Negative for AMI  >0.03 ng/mL-     Abnormal for myocardial necrosis.  Clinicians would have to utilize clinical acumen, EKG, Troponin and serial changes to determine if it is an Acute Myocardial Infarction or myocardial injury due to an underlying chronic condition.       Results may be falsely decreased if patient taking Biotin.      CBC & Differential [[id number]]  (Abnormal) Collected: 11/03/20 1201    Specimen: Blood Updated: 11/03/20 1213    Narrative:      The following orders were created for panel order CBC & Differential.  Procedure                               Abnormality         Status                     ---------                               -----------         ------                     CBC Auto Differential[[id number]]        Abnormal            Final result                 Please view results for these tests on the individual orders.    CBC Auto Differential [[id number]]  (Abnormal) Collected: 11/03/20 1201    Specimen: Blood Updated: 11/03/20 1213     WBC 17.10 10*3/mm3      RBC 5.24 10*6/mm3      Hemoglobin 15.5  g/dL      Hematocrit 44.7 %      MCV 85.3 fL      MCH 29.6 pg      MCHC 34.7 g/dL      RDW 13.2 %      RDW-SD 40.7 fl      MPV 10.9 fL      Platelets 263 10*3/mm3      Neutrophil % 76.0 %      Lymphocyte % 12.3 %      Monocyte % 8.7 %      Eosinophil % 2.2 %      Basophil % 0.3 %      Immature Grans % 0.5 %      Neutrophils, Absolute 13.01 10*3/mm3      Lymphocytes, Absolute 2.10 10*3/mm3      Monocytes, Absolute 1.49 10*3/mm3      Eosinophils, Absolute 0.37 10*3/mm3      Basophils, Absolute 0.05 10*3/mm3      Immature Grans, Absolute 0.08 10*3/mm3      nRBC 0.0 /100 WBC         Imaging Results (Last 72 Hours)     Procedure Component Value Units Date/Time    CT Angiogram Chest [[id number]] Collected: 11/03/20 1455     Updated: 11/03/20 1500    Narrative:      EXAMINATION: CT ANGIOGRAM CHEST-      11/3/2020 2:26 PM CST     HISTORY: Recent abdominal laparoscopic surgery now with shortness of  breath and bilateral upper chest pain     In order to have a CT radiation dose as low as reasonably achievable  Automated Exposure Control was utilized for adjustment of the mA and/or  KV according to patient size.     DLP in mGycm= 269.     CT angiography protocol.   CT imaging with bolus IV contrast injection.   Under concurrent supervision axial, sagittal, coronal, and  three-dimensional data sets were constructed.     Normal heart size.  Normal thoracic aorta with no aneurysm or dissection.     Normal and symmetric pulmonary arteries.  No pulmonary embolism.     Mild bibasilar atelectasis.  Upper lobes are clear.  No pneumothorax or pleural effusion.     Postoperative free air noted within within the upper abdomen.  Abdomen/pelvis CT dictated separately.     Summary:  1. No pulmonary embolism.  2. Mild basilar atelectasis.                                   This report was finalized on 11/03/2020 14:57 by Dr. Deon Hernandez MD.    CT Abdomen Pelvis With Contrast [[id number]] Collected: 11/03/20 1439     Updated: 11/03/20 1458     Narrative:      EXAMINATION: CT ABDOMEN PELVIS W CONTRAST-      11/3/2020 2:26 PM CST     HISTORY: Recent diverticulitis to surgery now with elevated white count  and abdominal pain     In order to have a CT radiation dose as low as reasonably achievable  Automated Exposure Control was utilized for adjustment of the mA and/or  KV according to patient size.     DLP in mGycm= 410.     Abdomen pelvis CT with IV contrast injection.     This patient is 4 days status post sigmoid colectomy for diverticulitis.     Intraperitoneal air.  Scattered foci of air within the mesentery.     Low sigmoid anastomotic line noted.  No pelvic abscess.     There is residual sigmoid colon wall thickening with surrounding  inflammation in the left side of the pelvis.     There is focal splenic flexure colonic wall thickening with some  adjacent mesenteric air and edema. (Axial images 25 through 33)  The pericolonic edema at left upper quadrant represents inflammatory  change which may be due to focal colitis or diverticulitis.  Focal mesenteric air in this location is compatible with small contained  perforation. There is no drainable abscess collection at this site.     No small bowel obstruction.  The appendix is visualized. There is no sign of appendicitis.     Heart size is normal.  Mild atelectasis at the lung bases.  No sign of pneumonia.     Normal liver and gallbladder.  Normal pancreas and spleen.  Normal and symmetric adrenal glands.  Bilateral renal cysts.  Right renal cyst = 27 x 24 mm.  Cysts within the left kidney measure up to 19 x 13 mm.     Normal abdominal aorta.     Summary:  1. Postsurgical changes with intra-abdominal air.  2. Inflammatory change and sigmoid colon wall thickening at the left  lower quadrant. CT appearance of residual diverticulitis.  2. No drainable abscess collection.  3. Focal inflammatory change and focal extraluminal air associated with  focal colonic wall thickening at the splenic flexure. This  appearance is  compatible with acute inflammatory change and localized perforation.  There is no abscess at this site.  4. Normal appearance of the rectosigmoid anastomosis.                                   This report was finalized on 11/03/2020 14:55 by Dr. Deon Hernandez MD.    XR Chest 1 View [[id number]] Collected: 11/03/20 1358     Updated: 11/03/20 1403    Narrative:      EXAMINATION: XR CHEST 1 VW-     11/3/2020 1:30 PM CST     HISTORY: Short of breath. Shoulder pain.     1 view chest x-ray with no comparison.     Heart size is normal.  The mediastinum is within normal limits.      The lungs are normally expanded with no pneumonia or pneumothorax.     No congestive failure changes.                                                                       Impression:      1. No acute disease.           This report was finalized on 11/03/2020 14:00 by Dr. Deon Hernandez MD.          Assessment/Plan       Diverticulitis      I discussed the CT findings with him and his significant other who is present.  This area at the splenic flexure is concerning.  He does have some diverticular disease in that region on his outside CT scan preoperatively.  Certainly injury at the time of splenic flexure mobilization is a possibility as well.  His body habitus did make visualization a little difficult as well as his thickened omentum in that region.  I am going to admit n.p.o. IV antibiotics pain medicine and monitor closely.      Gretchen Martinez MD  11/03/20  18:28 CST

## 2020-11-04 NOTE — PROGRESS NOTES
Gretchen Martinez MD Progress Note     LOS: 1 day   Patient Care Team:  Ivone Trujillo APRN as PCP - General (Family Medicine)  Epifanio Sandhu MD as Consulting Physician (Gastroenterology)        Subjective     He is feeling better.  Still having shoulder pain but it is abdomen pain is much improved.  He is breathing better.  Still feels some gassy pain.  Passing a lot of flatus.  No vomiting afebrile    Objective     Vital Signs  Temp:  [97.6 °F (36.4 °C)-98.5 °F (36.9 °C)] 97.7 °F (36.5 °C)  Heart Rate:  [] 84  Resp:  [15-18] 16  BP: (116-152)/(67-98) 124/70    Intake & Output (last 3 days)       11/01 0701 - 11/02 0700 11/02 0701 - 11/03 0700 11/03 0701 - 11/04 0700 11/04 0701 - 11/05 0700    Urine (mL/kg/hr)   300 400 (1)    Total Output   300 400    Net   -300 -400                  Physical Exam:     General Appearance:    Alert, cooperative, in no acute distress   Lungs:     respirations regular, even and unlabored    Heart:    Regular rhythm and normal rate, normal S1 and S2, no            murmur, no gallop, no rub   Chest Wall:    No abnormalities observed   Abdomen:      Softly distended especially on the right side.  He has had mild tenderness but greatly improved from yesterday.  The dressing still intact.   Extremities: No edema,    Results Review:     I reviewed the patient's new clinical results.    Lab Results (last 72 hours)     Procedure Component Value Units Date/Time    CBC & Differential [[id number]]  (Abnormal) Collected: 11/04/20 0354    Specimen: Blood Updated: 11/04/20 0542    Narrative:      The following orders were created for panel order CBC & Differential.  Procedure                               Abnormality         Status                     ---------                               -----------         ------                     CBC Auto Differential[[id number]]        Abnormal            Final result                 Please view results for these tests on the individual orders.     CBC Auto Differential [[id number]]  (Abnormal) Collected: 11/04/20 0354    Specimen: Blood Updated: 11/04/20 0542     WBC 13.48 10*3/mm3      RBC 4.97 10*6/mm3      Hemoglobin 14.5 g/dL      Hematocrit 44.4 %      MCV 89.3 fL      MCH 29.2 pg      MCHC 32.7 g/dL      RDW 13.2 %      RDW-SD 43.0 fl      MPV 11.1 fL      Platelets 245 10*3/mm3      Neutrophil % 68.2 %      Lymphocyte % 15.5 %      Monocyte % 10.1 %      Eosinophil % 5.3 %      Basophil % 0.4 %      Immature Grans % 0.5 %      Neutrophils, Absolute 9.20 10*3/mm3      Lymphocytes, Absolute 2.09 10*3/mm3      Monocytes, Absolute 1.36 10*3/mm3      Eosinophils, Absolute 0.71 10*3/mm3      Basophils, Absolute 0.05 10*3/mm3      Immature Grans, Absolute 0.07 10*3/mm3      nRBC 0.0 /100 WBC     Sedimentation Rate [[id number]]  (Abnormal) Collected: 11/04/20 0354    Specimen: Blood Updated: 11/04/20 0526     Sed Rate 70 mm/hr     Basic Metabolic Panel [[id number]]  (Abnormal) Collected: 11/04/20 0354    Specimen: Blood Updated: 11/04/20 0518     Glucose 126 mg/dL      BUN 14 mg/dL      Creatinine 0.70 mg/dL      Sodium 137 mmol/L      Potassium 3.9 mmol/L      Chloride 100 mmol/L      CO2 28.0 mmol/L      Calcium 9.3 mg/dL      eGFR Non African Amer 118 mL/min/1.73      BUN/Creatinine Ratio 20.0     Anion Gap 9.0 mmol/L     Narrative:      GFR Normal >60  Chronic Kidney Disease <60  Kidney Failure <15      C-reactive Protein [[id number]]  (Abnormal) Collected: 11/03/20 1201    Specimen: Blood Updated: 11/03/20 1829     C-Reactive Protein 10.90 mg/dL     COVID PRE-OP / PRE-PROCEDURE SCREENING ORDER (NO ISOLATION) - Swab, Nasal Cavity [[id number]]  (Normal) Collected: 11/03/20 1529    Specimen: Swab from Nasal Cavity Updated: 11/03/20 1711    Narrative:      The following orders were created for panel order COVID PRE-OP / PRE-PROCEDURE SCREENING ORDER (NO ISOLATION) - Swab, Nasal Cavity.  Procedure                               Abnormality         Status                      ---------                               -----------         ------                     COVID-19,[facility] IN-NAVI...[[id number]]  Normal              Final result                 Please view results for these tests on the individual orders.    COVID-19,[facility] IN-HOUSE,Nasal Swab No Transport Media 3-4 HR TAT - Swab, Nasal Cavity [[id number]]  (Normal) Collected: 11/03/20 1529    Specimen: Swab from Nasal Cavity Updated: 11/03/20 1711     COVID19 Not Detected    Narrative:      Fact sheet for providers: https://www.fda.gov/media/919315/download     Fact sheet for patients: https://www.fda.gov/media/833986/download    [place] Draw [[id number]] Collected: 11/03/20 1201    Specimen: Blood Updated: 11/03/20 1315    Narrative:      The following orders were created for panel order [place] Draw.  Procedure                               Abnormality         Status                     ---------                               -----------         ------                     Light Blue Top[[id number]]                                   Final result               Green Top (Gel)[[id number]]                                  Final result               Lavender Top[[id number]]                                     Final result               Red Top[[id number]]                                          Final result                 Please view results for these tests on the individual orders.    Light Blue Top [[id number]] Collected: 11/03/20 1201    Specimen: Blood Updated: 11/03/20 1315     Extra Tube hold for add-on     Comment: Auto resulted       Green Top (Gel) [[id number]] Collected: 11/03/20 1201    Specimen: Blood Updated: 11/03/20 1315     Extra Tube Hold for add-ons.     Comment: Auto resulted.       Red Top [[id number]] Collected: 11/03/20 1201    Specimen: Blood Updated: 11/03/20 1315     Extra Tube Hold for add-ons.     Comment: Auto resulted.       Lavender Top [[id number]] Collected: 11/03/20 1201    Specimen: Blood Updated:  11/03/20 1315     Extra Tube hold for add-on     Comment: Auto resulted       Protime-INR [[id number]]  (Normal) Collected: 11/03/20 1201    Specimen: Blood Updated: 11/03/20 1302     Protime 12.6 Seconds      INR 0.98    aPTT [[id number]]  (Normal) Collected: 11/03/20 1201    Specimen: Blood Updated: 11/03/20 1302     PTT 32.0 seconds     Lipase [[id number]]  (Normal) Collected: 11/03/20 1201    Specimen: Blood Updated: 11/03/20 1302     Lipase 19 U/L     Comprehensive Metabolic Panel [[id number]]  (Abnormal) Collected: 11/03/20 1201    Specimen: Blood Updated: 11/03/20 1233     Glucose 125 mg/dL      BUN 12 mg/dL      Creatinine 0.65 mg/dL      Sodium 133 mmol/L      Potassium 4.1 mmol/L      Comment: Slight hemolysis detected by analyzer. Results may be affected.        Chloride 96 mmol/L      CO2 27.0 mmol/L      Calcium 9.7 mg/dL      Total Protein 7.9 g/dL      Albumin 4.30 g/dL      ALT (SGPT) 33 U/L      AST (SGOT) 25 U/L      Alkaline Phosphatase 93 U/L      Total Bilirubin 0.9 mg/dL      eGFR Non African Amer 129 mL/min/1.73      Globulin 3.6 gm/dL      A/G Ratio 1.2 g/dL      BUN/Creatinine Ratio 18.5     Anion Gap 10.0 mmol/L     Narrative:      GFR Normal >60  Chronic Kidney Disease <60  Kidney Failure <15      BNP [[id number]]  (Normal) Collected: 11/03/20 1201    Specimen: Blood Updated: 11/03/20 1233     proBNP 67.4 pg/mL     Narrative:      Among patients with dyspnea, NT-proBNP is highly sensitive for the detection of acute congestive heart failure. In addition NT-proBNP of <300 pg/ml effectively rules out acute congestive heart failure with 99% negative predictive value.    Results may be falsely decreased if patient taking Biotin.      Troponin [[id number]]  (Normal) Collected: 11/03/20 1201    Specimen: Blood Updated: 11/03/20 1233     Troponin T <0.010 ng/mL     Narrative:      Troponin T Reference Range:  <= 0.03 ng/mL-   Negative for AMI  >0.03 ng/mL-     Abnormal for myocardial necrosis.   Clinicians would have to utilize clinical acumen, EKG, Troponin and serial changes to determine if it is an Acute Myocardial Infarction or myocardial injury due to an underlying chronic condition.       Results may be falsely decreased if patient taking Biotin.      CBC & Differential [[id number]]  (Abnormal) Collected: 11/03/20 1201    Specimen: Blood Updated: 11/03/20 1213    Narrative:      The following orders were created for panel order CBC & Differential.  Procedure                               Abnormality         Status                     ---------                               -----------         ------                     CBC Auto Differential[[id number]]        Abnormal            Final result                 Please view results for these tests on the individual orders.    CBC Auto Differential [[id number]]  (Abnormal) Collected: 11/03/20 1201    Specimen: Blood Updated: 11/03/20 1213     WBC 17.10 10*3/mm3      RBC 5.24 10*6/mm3      Hemoglobin 15.5 g/dL      Hematocrit 44.7 %      MCV 85.3 fL      MCH 29.6 pg      MCHC 34.7 g/dL      RDW 13.2 %      RDW-SD 40.7 fl      MPV 10.9 fL      Platelets 263 10*3/mm3      Neutrophil % 76.0 %      Lymphocyte % 12.3 %      Monocyte % 8.7 %      Eosinophil % 2.2 %      Basophil % 0.3 %      Immature Grans % 0.5 %      Neutrophils, Absolute 13.01 10*3/mm3      Lymphocytes, Absolute 2.10 10*3/mm3      Monocytes, Absolute 1.49 10*3/mm3      Eosinophils, Absolute 0.37 10*3/mm3      Basophils, Absolute 0.05 10*3/mm3      Immature Grans, Absolute 0.08 10*3/mm3      nRBC 0.0 /100 WBC         Imaging Results (Last 72 Hours)     Procedure Component Value Units Date/Time    CT Angiogram Chest [[id number]] Collected: 11/03/20 1455     Updated: 11/03/20 1500    Narrative:      EXAMINATION: CT ANGIOGRAM CHEST-      11/3/2020 2:26 PM CST     HISTORY: Recent abdominal laparoscopic surgery now with shortness of  breath and bilateral upper chest pain     In order to have a CT  radiation dose as low as reasonably achievable  Automated Exposure Control was utilized for adjustment of the mA and/or  KV according to patient size.     DLP in mGycm= 269.     CT angiography protocol.   CT imaging with bolus IV contrast injection.   Under concurrent supervision axial, sagittal, coronal, and  three-dimensional data sets were constructed.     Normal heart size.  Normal thoracic aorta with no aneurysm or dissection.     Normal and symmetric pulmonary arteries.  No pulmonary embolism.     Mild bibasilar atelectasis.  Upper lobes are clear.  No pneumothorax or pleural effusion.     Postoperative free air noted within within the upper abdomen.  Abdomen/pelvis CT dictated separately.     Summary:  1. No pulmonary embolism.  2. Mild basilar atelectasis.                                   This report was finalized on 11/03/2020 14:57 by Dr. Deon Hernandez MD.    CT Abdomen Pelvis With Contrast [[id number]] Collected: 11/03/20 1439     Updated: 11/03/20 1458    Narrative:      EXAMINATION: CT ABDOMEN PELVIS W CONTRAST-      11/3/2020 2:26 PM CST     HISTORY: Recent diverticulitis to surgery now with elevated white count  and abdominal pain     In order to have a CT radiation dose as low as reasonably achievable  Automated Exposure Control was utilized for adjustment of the mA and/or  KV according to patient size.     DLP in mGycm= 410.     Abdomen pelvis CT with IV contrast injection.     This patient is 4 days status post sigmoid colectomy for diverticulitis.     Intraperitoneal air.  Scattered foci of air within the mesentery.     Low sigmoid anastomotic line noted.  No pelvic abscess.     There is residual sigmoid colon wall thickening with surrounding  inflammation in the left side of the pelvis.     There is focal splenic flexure colonic wall thickening with some  adjacent mesenteric air and edema. (Axial images 25 through 33)  The pericolonic edema at left upper quadrant represents inflammatory  change  which may be due to focal colitis or diverticulitis.  Focal mesenteric air in this location is compatible with small contained  perforation. There is no drainable abscess collection at this site.     No small bowel obstruction.  The appendix is visualized. There is no sign of appendicitis.     Heart size is normal.  Mild atelectasis at the lung bases.  No sign of pneumonia.     Normal liver and gallbladder.  Normal pancreas and spleen.  Normal and symmetric adrenal glands.  Bilateral renal cysts.  Right renal cyst = 27 x 24 mm.  Cysts within the left kidney measure up to 19 x 13 mm.     Normal abdominal aorta.     Summary:  1. Postsurgical changes with intra-abdominal air.  2. Inflammatory change and sigmoid colon wall thickening at the left  lower quadrant. CT appearance of residual diverticulitis.  2. No drainable abscess collection.  3. Focal inflammatory change and focal extraluminal air associated with  focal colonic wall thickening at the splenic flexure. This appearance is  compatible with acute inflammatory change and localized perforation.  There is no abscess at this site.  4. Normal appearance of the rectosigmoid anastomosis.                                   This report was finalized on 11/03/2020 14:55 by Dr. Deon Hernandez MD.    XR Chest 1 View [[id number]] Collected: 11/03/20 1358     Updated: 11/03/20 1403    Narrative:      EXAMINATION: XR CHEST 1 VW-     11/3/2020 1:30 PM CST     HISTORY: Short of breath. Shoulder pain.     1 view chest x-ray with no comparison.     Heart size is normal.  The mediastinum is within normal limits.      The lungs are normally expanded with no pneumonia or pneumothorax.     No congestive failure changes.                                                                       Impression:      1. No acute disease.           This report was finalized on 11/03/2020 14:00 by Dr. Deon Hernandez MD.              Assessment/Plan       Diverticulitis      Keep n.p.o. ambulate trial  of Toradol.  His abdominal exam is significantly improved from yesterday which is encouraging his white count is down a little bit as well.  We again discussed the possible etiology of this being injury from the time of surgery or inflammation from dissection.  Continue antibiotics      Gretchen Martinez MD  11/04/20  11:19 CST

## 2020-11-04 NOTE — PAYOR COMM NOTE
"Epifanio Nuno (50 y.o. Male) [id number]    Admit  11/3   [facility] phone   Fax         Date of Birth Social Security Number Address Home Phone MRN    1970  [address] [place] [address] [phone] [id number]    Druze Marital Status          Other        Admission Date Admission Type Admitting Provider Attending Provider Department, Room/Bed    11/3/20 Emergency Gretchen Martinez MD Tyrrell, Dana R, MD [facility] 3C, 362/1    Discharge Date Discharge Disposition Discharge Destination                       Attending Provider: Gretchen Martinez MD    Allergies: Hydrocodone, Hydrocodone-acetaminophen    Isolation: None   Infection: None   Code Status: CPR    Ht: 160 cm (63\")   Wt: 91.6 kg (202 lb)    Admission Cmt: None   Principal Problem: None                Active Insurance as of 11/3/2020     Primary Coverage     Payor Plan Insurance Group Employer/Plan Group    [facility] [email or web address] [facility] [id number]     Payor Plan Address Payor Plan Phone Number Payor Plan Fax Number Effective Dates    [address] [phone]  1/1/2020 - None Entered    [facility] [id number]       Subscriber Name Subscriber Birth Date Member ID       EPIFANIO NUNO 1970 [id number]                 Emergency Contacts      (Rel.) Home Phone Work Phone Mobile Phone    Cody Nuno (Spouse) [phone] -- [phone]    Twyla Mcclellan (Mother) [phone] -- [phone]               History & Physical      Gretchen Martinez MD at 11/03/20 1828            Gretchen Mratinez MD  H&P    Patient Care Team:  Ivone Trujillo APRN as PCP - General (Family Medicine)  Epifanio Sandhu MD as Consulting Physician (Gastroenterology)    Chief complaint abdominal pain shortness of breath    Subjective     Epifanio Nuno  is a 50 y.o. male presents with patient underwent laparoscopic mobilization of splenic flexure and sigmoid colectomy 5 days " ago.  He did well and was discharged home yesterday feeling great.  He states last night he began having some shortness of breath and woke up this morning with very shallow inspirations.  Is also complaining of shoulder pain mostly on the right side but some on the left side.  Had a normal bowel movement this morning.  No nausea no vomiting he denied any fevers.  He called the office and I spoke to his mother who I told to bring him to the emergency room for evaluation.  In the ER he had a CTA to rule out pulmonary embolism which was negative.  An abdominal CT was performed which showed air which certainly could be residual from his surgery but he also had some thickening of the colon at the splenic flexure with some contained air in the region.  Is also noted to have leukocytosis..      Review of Systems   Pertinent items are noted in HPI, all other systems reviewed and negative    History  Past Medical History:   Diagnosis Date   • Abnormal serum enzyme level    • Asthma    • Diverticulitis    • Elevated C-reactive protein (CRP)    • Hematuria    • Hyperlipidemia    • Internal hemorrhage    • Internal hemorrhoids    • Kidney cysts    • Peptic ulcer    • PONV (postoperative nausea and vomiting)      Past Surgical History:   Procedure Laterality Date   • COLON RESECTION N/A 10/30/2020    Procedure: LAPAROSCOPIC ASSISTED SIGMOID COLECTOMY AND LAPAROSCOPIC TAKEDOWN OF SPLEENIC FLEXURE;  Surgeon: Gretchen Martinez MD;  Location: [facility];  Service: General;  Laterality: N/A;   • HEMORRHOIDECTOMY     • KNEE ARTHROSCOPY     • KNEE SURGERY Right     laceration repair   • NASAL SEPTUM SURGERY     • RHINOPLASTY     • ROTATOR CUFF REPAIR       Family History   Problem Relation Age of Onset   • Diverticulitis Maternal Aunt    • Diverticulitis Maternal Aunt    • Diverticulitis Maternal Great-Grandmother    • Colon cancer Neg Hx    • Colon polyps Neg Hx      Social History     Tobacco Use   • Smoking status: Never Smoker   •  Smokeless tobacco: Never Used   Substance Use Topics   • Alcohol use: Yes     Frequency: Never     Comment: occ   • Drug use: No     Medications Prior to Admission   Medication Sig Dispense Refill Last Dose   • albuterol sulfate  (90 Base) MCG/ACT inhaler Inhale 2 puffs Every 4 (Four) Hours As Needed.      • oxyCODONE-acetaminophen (PERCOCET)  MG per tablet Take 1 tablet by mouth Every 4 (Four) Hours As Needed for Moderate Pain  for up to 30 doses. 30 tablet 0      Allergies:  Hydrocodone and Hydrocodone-acetaminophen    Objective     Vital Signs  Temp:  [98.3 °F (36.8 °C)] 98.3 °F (36.8 °C)  Heart Rate:  [] 100  Resp:  [15-18] 15  BP: (126-152)/(85-98) 137/96    Physical Exam:      General Appearance:    Alert, cooperative, in no acute distress   Head:    Normocephalic, without obvious abnormality, atraumatic   Eyes:            Lids and lashes normal, conjunctivae and sclerae normal, no   icterus, no pallor, corneas clear, PERRLA   Ears:    Ears appear intact with no abnormalities noted   Neck:   No adenopathy, supple, trachea midline   Back:     No kyphosis present, no scoliosis present, no skin lesions,      erythema or scars, no tenderness to percussion or                   palpation,   range of motion normal   Lungs:     Clear to auscultation,respirations regular, even and                  unlabored    Heart:    Regular rhythm and normal rate, normal S1 and S2, no            murmur, no gallop, no rub, no click   Chest Wall:    No abnormalities observed   Abdomen:    His wounds are intact.  He is slightly distended and tender in the left upper quadrant.  He has hypoactive but positive bowel sounds   Rectal:     Deferred   Extremities:   Moves all extremities well, no edema, no cyanosis, no             redness   Pulses:   Pulses palpable and equal bilaterally   Skin:   No bleeding, bruising or rash   Lymph nodes:   No palpable adenopathy   Neurologic:   No focal deficits       Results Review:         Lab Results (last 72 hours)     Procedure Component Value Units Date/Time    C-reactive Protein [[id number]] Collected: 11/03/20 1201    Specimen: Blood Updated: 11/03/20 1809    COVID PRE-OP / PRE-PROCEDURE SCREENING ORDER (NO ISOLATION) - Swab, Nasal Cavity [[id number]]  (Normal) Collected: 11/03/20 1529    Specimen: Swab from Nasal Cavity Updated: 11/03/20 1711    Narrative:      The following orders were created for panel order COVID PRE-OP / PRE-PROCEDURE SCREENING ORDER (NO ISOLATION) - Swab, Nasal Cavity.  Procedure                               Abnormality         Status                     ---------                               -----------         ------                     COVID-19,[employer] [employer] IN-NAVI...[[id number]]  Normal              Final result                 Please view results for these tests on the individual orders.    COVID-19,[facility] [employer] IN-HOUSE,Nasal Swab No Transport Media 3-4 HR TAT - Swab, Nasal Cavity [[id number]]  (Normal) Collected: 11/03/20 1529    Specimen: Swab from Nasal Cavity Updated: 11/03/20 1711     COVID19 Not Detected    Narrative:      Fact sheet for providers: https://www.fda.gov/media/204869/download     Fact sheet for patients: https://www.fda.gov/media/992207/download    [place] Draw [[id number]] Collected: 11/03/20 1201    Specimen: Blood Updated: 11/03/20 1315    Narrative:      The following orders were created for panel order [place] Draw.  Procedure                               Abnormality         Status                     ---------                               -----------         ------                     Light Blue Top[[id number]]                                   Final result               Green Top (Gel)[[id number]]                                  Final result               Lavender Top[[id number]]                                     Final result               Red Top[[id number]]                                          Final result                 Please view  results for these tests on the individual orders.    Light Blue Top [[id number]] Collected: 11/03/20 1201    Specimen: Blood Updated: 11/03/20 1315     Extra Tube hold for add-on     Comment: Auto resulted       Green Top (Gel) [[id number]] Collected: 11/03/20 1201    Specimen: Blood Updated: 11/03/20 1315     Extra Tube Hold for add-ons.     Comment: Auto resulted.       Red Top [[id number]] Collected: 11/03/20 1201    Specimen: Blood Updated: 11/03/20 1315     Extra Tube Hold for add-ons.     Comment: Auto resulted.       Lavender Top [[id number]] Collected: 11/03/20 1201    Specimen: Blood Updated: 11/03/20 1315     Extra Tube hold for add-on     Comment: Auto resulted       Protime-INR [[id number]]  (Normal) Collected: 11/03/20 1201    Specimen: Blood Updated: 11/03/20 1302     Protime 12.6 Seconds      INR 0.98    aPTT [[id number]]  (Normal) Collected: 11/03/20 1201    Specimen: Blood Updated: 11/03/20 1302     PTT 32.0 seconds     Lipase [[id number]]  (Normal) Collected: 11/03/20 1201    Specimen: Blood Updated: 11/03/20 1302     Lipase 19 U/L     Comprehensive Metabolic Panel [[id number]]  (Abnormal) Collected: 11/03/20 1201    Specimen: Blood Updated: 11/03/20 1233     Glucose 125 mg/dL      BUN 12 mg/dL      Creatinine 0.65 mg/dL      Sodium 133 mmol/L      Potassium 4.1 mmol/L      Comment: Slight hemolysis detected by analyzer. Results may be affected.        Chloride 96 mmol/L      CO2 27.0 mmol/L      Calcium 9.7 mg/dL      Total Protein 7.9 g/dL      Albumin 4.30 g/dL      ALT (SGPT) 33 U/L      AST (SGOT) 25 U/L      Alkaline Phosphatase 93 U/L      Total Bilirubin 0.9 mg/dL      eGFR Non African Amer 129 mL/min/1.73      Globulin 3.6 gm/dL      A/G Ratio 1.2 g/dL      BUN/Creatinine Ratio 18.5     Anion Gap 10.0 mmol/L     Narrative:      GFR Normal >60  Chronic Kidney Disease <60  Kidney Failure <15      BNP [[id number]]  (Normal) Collected: 11/03/20 1201    Specimen: Blood Updated: 11/03/20 1233     proBNP  67.4 pg/mL     Narrative:      Among patients with dyspnea, NT-proBNP is highly sensitive for the detection of acute congestive heart failure. In addition NT-proBNP of <300 pg/ml effectively rules out acute congestive heart failure with 99% negative predictive value.    Results may be falsely decreased if patient taking Biotin.      Troponin [[id number]]  (Normal) Collected: 11/03/20 1201    Specimen: Blood Updated: 11/03/20 1233     Troponin T <0.010 ng/mL     Narrative:      Troponin T Reference Range:  <= 0.03 ng/mL-   Negative for AMI  >0.03 ng/mL-     Abnormal for myocardial necrosis.  Clinicians would have to utilize clinical acumen, EKG, Troponin and serial changes to determine if it is an Acute Myocardial Infarction or myocardial injury due to an underlying chronic condition.       Results may be falsely decreased if patient taking Biotin.      CBC & Differential [[id number]]  (Abnormal) Collected: 11/03/20 1201    Specimen: Blood Updated: 11/03/20 1213    Narrative:      The following orders were created for panel order CBC & Differential.  Procedure                               Abnormality         Status                     ---------                               -----------         ------                     CBC Auto Differential[[id number]]        Abnormal            Final result                 Please view results for these tests on the individual orders.    CBC Auto Differential [[id number]]  (Abnormal) Collected: 11/03/20 1201    Specimen: Blood Updated: 11/03/20 1213     WBC 17.10 10*3/mm3      RBC 5.24 10*6/mm3      Hemoglobin 15.5 g/dL      Hematocrit 44.7 %      MCV 85.3 fL      MCH 29.6 pg      MCHC 34.7 g/dL      RDW 13.2 %      RDW-SD 40.7 fl      MPV 10.9 fL      Platelets 263 10*3/mm3      Neutrophil % 76.0 %      Lymphocyte % 12.3 %      Monocyte % 8.7 %      Eosinophil % 2.2 %      Basophil % 0.3 %      Immature Grans % 0.5 %      Neutrophils, Absolute 13.01 10*3/mm3      Lymphocytes, Absolute  2.10 10*3/mm3      Monocytes, Absolute 1.49 10*3/mm3      Eosinophils, Absolute 0.37 10*3/mm3      Basophils, Absolute 0.05 10*3/mm3      Immature Grans, Absolute 0.08 10*3/mm3      nRBC 0.0 /100 WBC         Imaging Results (Last 72 Hours)     Procedure Component Value Units Date/Time    CT Angiogram Chest [[id number]] Collected: 11/03/20 1455     Updated: 11/03/20 1500    Narrative:      EXAMINATION: CT ANGIOGRAM CHEST-      11/3/2020 2:26 PM CST     HISTORY: Recent abdominal laparoscopic surgery now with shortness of  breath and bilateral upper chest pain     In order to have a CT radiation dose as low as reasonably achievable  Automated Exposure Control was utilized for adjustment of the mA and/or  KV according to patient size.     DLP in mGycm= 269.     CT angiography protocol.   CT imaging with bolus IV contrast injection.   Under concurrent supervision axial, sagittal, coronal, and  three-dimensional data sets were constructed.     Normal heart size.  Normal thoracic aorta with no aneurysm or dissection.     Normal and symmetric pulmonary arteries.  No pulmonary embolism.     Mild bibasilar atelectasis.  Upper lobes are clear.  No pneumothorax or pleural effusion.     Postoperative free air noted within within the upper abdomen.  Abdomen/pelvis CT dictated separately.     Summary:  1. No pulmonary embolism.  2. Mild basilar atelectasis.                                   This report was finalized on 11/03/2020 14:57 by Dr. Deon Hernandez MD.    CT Abdomen Pelvis With Contrast [[id number]] Collected: 11/03/20 1439     Updated: 11/03/20 1458    Narrative:      EXAMINATION: CT ABDOMEN PELVIS W CONTRAST-      11/3/2020 2:26 PM CST     HISTORY: Recent diverticulitis to surgery now with elevated white count  and abdominal pain     In order to have a CT radiation dose as low as reasonably achievable  Automated Exposure Control was utilized for adjustment of the mA and/or  KV according to patient size.     DLP in mGycm=  410.     Abdomen pelvis CT with IV contrast injection.     This patient is 4 days status post sigmoid colectomy for diverticulitis.     Intraperitoneal air.  Scattered foci of air within the mesentery.     Low sigmoid anastomotic line noted.  No pelvic abscess.     There is residual sigmoid colon wall thickening with surrounding  inflammation in the left side of the pelvis.     There is focal splenic flexure colonic wall thickening with some  adjacent mesenteric air and edema. (Axial images 25 through 33)  The pericolonic edema at left upper quadrant represents inflammatory  change which may be due to focal colitis or diverticulitis.  Focal mesenteric air in this location is compatible with small contained  perforation. There is no drainable abscess collection at this site.     No small bowel obstruction.  The appendix is visualized. There is no sign of appendicitis.     Heart size is normal.  Mild atelectasis at the lung bases.  No sign of pneumonia.     Normal liver and gallbladder.  Normal pancreas and spleen.  Normal and symmetric adrenal glands.  Bilateral renal cysts.  Right renal cyst = 27 x 24 mm.  Cysts within the left kidney measure up to 19 x 13 mm.     Normal abdominal aorta.     Summary:  1. Postsurgical changes with intra-abdominal air.  2. Inflammatory change and sigmoid colon wall thickening at the left  lower quadrant. CT appearance of residual diverticulitis.  2. No drainable abscess collection.  3. Focal inflammatory change and focal extraluminal air associated with  focal colonic wall thickening at the splenic flexure. This appearance is  compatible with acute inflammatory change and localized perforation.  There is no abscess at this site.  4. Normal appearance of the rectosigmoid anastomosis.                                   This report was finalized on 11/03/2020 14:55 by Dr. Deon Hernandez MD.    XR Chest 1 View [[id number]] Collected: 11/03/20 1358     Updated: 11/03/20 1403    Narrative:       EXAMINATION: XR CHEST 1 VW-     11/3/2020 1:30 PM CST     HISTORY: Short of breath. Shoulder pain.     1 view chest x-ray with no comparison.     Heart size is normal.  The mediastinum is within normal limits.      The lungs are normally expanded with no pneumonia or pneumothorax.     No congestive failure changes.                                                                       Impression:      1. No acute disease.           This report was finalized on 11/03/2020 14:00 by Dr. Deon Hernandez MD.          Assessment/Plan       Diverticulitis      I discussed the CT findings with him and his significant other who is present.  This area at the splenic flexure is concerning.  He does have some diverticular disease in that region on his outside CT scan preoperatively.  Certainly injury at the time of splenic flexure mobilization is a possibility as well.  His body habitus did make visualization a little difficult as well as his thickened omentum in that region.  I am going to admit n.p.o. IV antibiotics pain medicine and monitor closely.      Gretchen Martinez MD  11/03/20  18:28 CST          Electronically signed by Gretchen Martinez MD at 11/03/20 1835          Emergency Department Notes      Benito Salinas MD at 11/03/20 1520          Subjective   Mr. Crandall is a 50-year-old gentleman who recently underwent surgery for complications associated with diverticulitis.  He presents today complaining of worsening shortness of breath and especially right shoulder pain although he does have some left shoulder pain.  He has some abdominal pain as well which is diffuse and he says it has not particularly exacerbated since he was discharged from the hospital yesterday.  He is not sure if he said fever but he does feel cold and hot and diaphoretic alternating.  He has had 2 mild nausea but no vomiting.          Review of Systems   Constitutional: Positive for chills and diaphoresis.   Respiratory: Positive for  shortness of breath.    Gastrointestinal: Positive for abdominal pain.   All other systems reviewed and are negative.      Past Medical History:   Diagnosis Date   • Abnormal serum enzyme level    • Asthma    • Diverticulitis    • Elevated C-reactive protein (CRP)    • Hematuria    • Hyperlipidemia    • Internal hemorrhage    • Internal hemorrhoids    • Kidney cysts    • Peptic ulcer    • PONV (postoperative nausea and vomiting)        Allergies   Allergen Reactions   • Hydrocodone Itching   • Hydrocodone-Acetaminophen Itching     Reaction: Itching         Past Surgical History:   Procedure Laterality Date   • COLON RESECTION N/A 10/30/2020    Procedure: LAPAROSCOPIC ASSISTED SIGMOID COLECTOMY AND LAPAROSCOPIC TAKEDOWN OF SPLEENIC FLEXURE;  Surgeon: Gretchen Martinez MD;  Location: [facility];  Service: General;  Laterality: N/A;   • HEMORRHOIDECTOMY     • KNEE ARTHROSCOPY     • KNEE SURGERY Right     laceration repair   • NASAL SEPTUM SURGERY     • RHINOPLASTY     • ROTATOR CUFF REPAIR         Family History   Problem Relation Age of Onset   • Diverticulitis Maternal Aunt    • Diverticulitis Maternal Aunt    • Diverticulitis Maternal Great-Grandmother    • Colon cancer Neg Hx    • Colon polyps Neg Hx        Social History     Socioeconomic History   • Marital status:      Spouse name: Not on file   • Number of children: Not on file   • Years of education: Not on file   • Highest education level: Not on file   Tobacco Use   • Smoking status: Never Smoker   • Smokeless tobacco: Never Used   Substance and Sexual Activity   • Alcohol use: Yes     Frequency: Never     Comment: occ   • Drug use: No   • Sexual activity: Defer           Objective   Physical Exam  Vitals signs and nursing note reviewed.   Constitutional:       Appearance: He is well-developed.   HENT:      Head: Normocephalic and atraumatic.      Right Ear: External ear normal.      Left Ear: External ear normal.      Nose: Nose normal.   Eyes:       Conjunctiva/sclera: Conjunctivae normal.   Neck:      Musculoskeletal: Normal range of motion and neck supple.   Cardiovascular:      Rate and Rhythm: Normal rate and regular rhythm.      Heart sounds: Normal heart sounds.   Pulmonary:      Effort: Pulmonary effort is normal.      Breath sounds: Normal breath sounds.   Abdominal:      General: Bowel sounds are normal.      Palpations: Abdomen is soft.   Musculoskeletal: Normal range of motion.   Skin:     General: Skin is warm and dry.      Capillary Refill: Capillary refill takes less than 2 seconds.   Neurological:      Mental Status: He is alert and oriented to person, place, and time.   Psychiatric:         Behavior: Behavior normal.         Thought Content: Thought content normal.         Judgment: Judgment normal.         Procedures          ED Course                                           MDM  Number of Diagnoses or Management Options     Amount and/or Complexity of Data Reviewed  Clinical lab tests: reviewed and ordered  Tests in the radiology section of CPT®: reviewed and ordered  Tests in the medicine section of CPT®: reviewed and ordered  Decide to obtain previous medical records or to obtain history from someone other than the patient: yes    Patient Progress  Patient progress: stable      Final diagnoses:   Diverticulitis of large intestine with complication     The patient's work-up in terms of a shortness of of breath was essentially negative with a normal EKG, negative troponin, normal BNP and a CT angiogram of the chest which did not reveal any abnormalities.  I did do a CT of his abdomen and pelvis because of the ongoing abdominal pain and elevated white count and chills and diaphoresis which revealed some splenic flexure abnormalities which I discussed with Dr. Martinez.  He took a look at the CT scan and call back suggested we admit the patient under his care.  I discussed this with the patient and he is okay with that plan and is currently  stable in the ED.       Benito Salinas MD  11/03/20 1529      Electronically signed by Benito Salinas MD at 11/03/20 1529       11/3  hrt rate   101 106  104    o2 2lit     Lab  WBC 17     Neb treatments    Zosyn iv    Iv 100 hr    Morphine iv x2  zofran iv x1  11/4  Morphine iv x2        11/4  Nurse note :   Pt having gas pain.  Medicted with prn meds.  Encouraged pt to ambulate, which he has.  IVF, abx.  ML and 3 lap drsgs c/d/i.  NPO.  O2 2L nc.  Pt anxious at times.  Cont to monitor.      Current Facility-Administered Medications   Medication Dose Route Frequency Provider Last Rate Last Dose   • dextrose 5 % and lactated Ringer's infusion  100 mL/hr Intravenous Continuous Gretchen Martinez  mL/hr at 11/04/20 0210 100 mL/hr at 11/04/20 0210   • ipratropium-albuterol (DUO-NEB) nebulizer solution 3 mL  3 mL Nebulization 4x Daily - RT Gretchen Martinez MD   3 mL at 11/04/20 0649   • morphine injection 4 mg  4 mg Intravenous Q3H PRN Gretchen Martinez MD   4 mg at 11/04/20 0749   • Morphine sulfate (PF) injection 2 mg  2 mg Intravenous Q3H PRN Gretchen Martinez MD       • ondansetron (ZOFRAN) injection 4 mg  4 mg Intravenous Q6H PRN Gretchen Martinez MD   4 mg at 11/03/20 1842   • piperacillin-tazobactam (ZOSYN) 3.375 g in iso-osmotic dextrose 50 ml (premix)  3.375 g Intravenous Q8H Gretchen Martinez MD   3.375 g at 11/04/20 0909   • sodium chloride 0.9 % flush 10 mL  10 mL Intravenous PRN Benito Salinas MD       • sodium chloride 0.9 % flush 10 mL  10 mL Intravenous Q12H Gretchen Martinez MD   10 mL at 11/04/20 0909   • sodium chloride 0.9 % flush 10 mL  10 mL Intravenous PRN Gretchen Martinez MD

## 2020-11-04 NOTE — PLAN OF CARE
Goal Outcome Evaluation:  Plan of Care Reviewed With: patient  Progress: no change  Outcome Summary: Pt having gas pain.  Medicted with prn meds.  Encouraged pt to ambulate, which he has.  IVF, abx.  ML and 3 lap drsgs c/d/i.  NPO.  O2 2L nc.  Pt anxious at times.  Cont to monitor.

## 2020-11-05 LAB
ANION GAP SERPL CALCULATED.3IONS-SCNC: 9 MMOL/L (ref 5–15)
BASOPHILS # BLD AUTO: 0.04 10*3/MM3 (ref 0–0.2)
BASOPHILS NFR BLD AUTO: 0.3 % (ref 0–1.5)
BUN SERPL-MCNC: 14 MG/DL (ref 6–20)
BUN/CREAT SERPL: 19.7 (ref 7–25)
CALCIUM SPEC-SCNC: 9 MG/DL (ref 8.6–10.5)
CHLORIDE SERPL-SCNC: 103 MMOL/L (ref 98–107)
CO2 SERPL-SCNC: 28 MMOL/L (ref 22–29)
CREAT SERPL-MCNC: 0.71 MG/DL (ref 0.76–1.27)
DEPRECATED RDW RBC AUTO: 41.9 FL (ref 37–54)
EOSINOPHIL # BLD AUTO: 0.86 10*3/MM3 (ref 0–0.4)
EOSINOPHIL NFR BLD AUTO: 7.5 % (ref 0.3–6.2)
ERYTHROCYTE [DISTWIDTH] IN BLOOD BY AUTOMATED COUNT: 13.2 % (ref 12.3–15.4)
GFR SERPL CREATININE-BSD FRML MDRD: 117 ML/MIN/1.73
GLUCOSE SERPL-MCNC: 117 MG/DL (ref 65–99)
HCT VFR BLD AUTO: 39.2 % (ref 37.5–51)
HGB BLD-MCNC: 13.2 G/DL (ref 13–17.7)
IMM GRANULOCYTES # BLD AUTO: 0.06 10*3/MM3 (ref 0–0.05)
IMM GRANULOCYTES NFR BLD AUTO: 0.5 % (ref 0–0.5)
LYMPHOCYTES # BLD AUTO: 1.85 10*3/MM3 (ref 0.7–3.1)
LYMPHOCYTES NFR BLD AUTO: 16.2 % (ref 19.6–45.3)
MCH RBC QN AUTO: 29.5 PG (ref 26.6–33)
MCHC RBC AUTO-ENTMCNC: 33.7 G/DL (ref 31.5–35.7)
MCV RBC AUTO: 87.5 FL (ref 79–97)
MONOCYTES # BLD AUTO: 1.07 10*3/MM3 (ref 0.1–0.9)
MONOCYTES NFR BLD AUTO: 9.4 % (ref 5–12)
NEUTROPHILS NFR BLD AUTO: 66.1 % (ref 42.7–76)
NEUTROPHILS NFR BLD AUTO: 7.56 10*3/MM3 (ref 1.7–7)
NRBC BLD AUTO-RTO: 0 /100 WBC (ref 0–0.2)
PLATELET # BLD AUTO: 220 10*3/MM3 (ref 140–450)
PMV BLD AUTO: 10.6 FL (ref 6–12)
POTASSIUM SERPL-SCNC: 4.1 MMOL/L (ref 3.5–5.2)
RBC # BLD AUTO: 4.48 10*6/MM3 (ref 4.14–5.8)
SODIUM SERPL-SCNC: 140 MMOL/L (ref 136–145)
WBC # BLD AUTO: 11.44 10*3/MM3 (ref 3.4–10.8)

## 2020-11-05 PROCEDURE — 25010000002 MORPHINE SULFATE (PF) 2 MG/ML SOLUTION: Performed by: SPECIALIST

## 2020-11-05 PROCEDURE — 80048 BASIC METABOLIC PNL TOTAL CA: CPT | Performed by: SPECIALIST

## 2020-11-05 PROCEDURE — 25010000002 PIPERACILLIN SOD-TAZOBACTAM PER 1 G: Performed by: SPECIALIST

## 2020-11-05 PROCEDURE — 85025 COMPLETE CBC W/AUTO DIFF WBC: CPT | Performed by: SPECIALIST

## 2020-11-05 PROCEDURE — 25010000002 KETOROLAC TROMETHAMINE PER 15 MG: Performed by: SPECIALIST

## 2020-11-05 PROCEDURE — 94799 UNLISTED PULMONARY SVC/PX: CPT

## 2020-11-05 PROCEDURE — 36415 COLL VENOUS BLD VENIPUNCTURE: CPT | Performed by: SPECIALIST

## 2020-11-05 RX ADMIN — IPRATROPIUM BROMIDE AND ALBUTEROL SULFATE 3 ML: 2.5; .5 SOLUTION RESPIRATORY (INHALATION) at 14:00

## 2020-11-05 RX ADMIN — MORPHINE SULFATE 2 MG: 2 INJECTION, SOLUTION INTRAMUSCULAR; INTRAVENOUS at 06:37

## 2020-11-05 RX ADMIN — TAZOBACTAM SODIUM AND PIPERACILLIN SODIUM 3.38 G: 375; 3 INJECTION, SOLUTION INTRAVENOUS at 16:55

## 2020-11-05 RX ADMIN — IPRATROPIUM BROMIDE AND ALBUTEROL SULFATE 3 ML: 2.5; .5 SOLUTION RESPIRATORY (INHALATION) at 09:49

## 2020-11-05 RX ADMIN — SODIUM CHLORIDE, SODIUM LACTATE, POTASSIUM CHLORIDE, CALCIUM CHLORIDE AND DEXTROSE MONOHYDRATE 100 ML/HR: 5; 600; 310; 30; 20 INJECTION, SOLUTION INTRAVENOUS at 01:40

## 2020-11-05 RX ADMIN — KETOROLAC TROMETHAMINE 15 MG: 30 INJECTION, SOLUTION INTRAMUSCULAR at 04:24

## 2020-11-05 RX ADMIN — MORPHINE SULFATE 2 MG: 2 INJECTION, SOLUTION INTRAMUSCULAR; INTRAVENOUS at 16:54

## 2020-11-05 RX ADMIN — KETOROLAC TROMETHAMINE 15 MG: 30 INJECTION, SOLUTION INTRAMUSCULAR at 20:13

## 2020-11-05 RX ADMIN — KETOROLAC TROMETHAMINE 15 MG: 30 INJECTION, SOLUTION INTRAMUSCULAR at 12:53

## 2020-11-05 RX ADMIN — SODIUM CHLORIDE, SODIUM LACTATE, POTASSIUM CHLORIDE, CALCIUM CHLORIDE AND DEXTROSE MONOHYDRATE 100 ML/HR: 5; 600; 310; 30; 20 INJECTION, SOLUTION INTRAVENOUS at 23:16

## 2020-11-05 RX ADMIN — IPRATROPIUM BROMIDE AND ALBUTEROL SULFATE 3 ML: 2.5; .5 SOLUTION RESPIRATORY (INHALATION) at 19:07

## 2020-11-05 RX ADMIN — TAZOBACTAM SODIUM AND PIPERACILLIN SODIUM 3.38 G: 375; 3 INJECTION, SOLUTION INTRAVENOUS at 09:25

## 2020-11-05 RX ADMIN — TAZOBACTAM SODIUM AND PIPERACILLIN SODIUM 3.38 G: 375; 3 INJECTION, SOLUTION INTRAVENOUS at 01:40

## 2020-11-05 RX ADMIN — SODIUM CHLORIDE, SODIUM LACTATE, POTASSIUM CHLORIDE, CALCIUM CHLORIDE AND DEXTROSE MONOHYDRATE 100 ML/HR: 5; 600; 310; 30; 20 INJECTION, SOLUTION INTRAVENOUS at 11:22

## 2020-11-05 RX ADMIN — IPRATROPIUM BROMIDE AND ALBUTEROL SULFATE 3 ML: 2.5; .5 SOLUTION RESPIRATORY (INHALATION) at 07:13

## 2020-11-05 NOTE — PLAN OF CARE
Goal Outcome Evaluation:  Plan of Care Reviewed With: patient  Progress: no change  Outcome Summary: pt c/o pain throughout shift see MAR for interventions; ambulated in [place] independently; VSS; IVF; IV abxl ML lap x3 drsg CDI; safety maintained; cont to monitor

## 2020-11-05 NOTE — PLAN OF CARE
Problem: Adult Inpatient Plan of Care  Goal: Plan of Care Review  Outcome: Ongoing, Progressing  Flowsheets (Taken 11/5/2020 0453)  Progress: no change  Plan of Care Reviewed With: patient  Outcome Summary: C/o moderate pain this shift, medicated with prn IV pain meds x1 and scheduled toradol, pt reported good relief. Abd incisions with g/t remain c/d/i. O2 @ 2L, pt not tolerating room air while sleeping. NPO. IVF and abx infused per order. VSS, will cont to monitor.

## 2020-11-05 NOTE — PROGRESS NOTES
Gretchen Martinez MD Progress Note     LOS: 2 days   Patient Care Team:  Ivone Trujillo APRN as PCP - General (Family Medicine)  Epifanio Sandhu MD as Consulting Physician (Gastroenterology)        Subjective     The Toradol helped a lot with his pain.  And ambulating.  Passing some flatus but no bowel movement still    Objective     Vital Signs  Temp:  [97.6 °F (36.4 °C)-98 °F (36.7 °C)] 97.8 °F (36.6 °C)  Heart Rate:  [71-84] 71  Resp:  [16-18] 18  BP: (107-127)/(60-75) 127/75    Intake & Output (last 3 days)       11/02 0701 - 11/03 0700 11/03 0701 - 11/04 0700 11/04 0701 - 11/05 0700 11/05 0701 - 11/06 0700    I.V. (mL/kg)   4104 (44.8)     IV Piggyback   274.4     Total Intake(mL/kg)   4378.4 (47.8)     Urine (mL/kg/hr)  300 1975 (0.9)     Total Output  300 1975     Net  -300 +2403.4                   Physical Exam:     General Appearance:    Alert, cooperative, in no acute distress   Lungs:     respirations regular, even and unlabored    Heart:    Regular rhythm and normal rate, normal S1 and S2, no            murmur, no gallop, no rub   Chest Wall:    No abnormalities observed   Abdomen:      Abdomen is mildly tender on the right side along the ascending colon distribution.  No significant peritoneal signs.  Left side is nontender.  He has hypoactive bowel sounds   Extremities: No edema,    Results Review:     I reviewed the patient's new clinical results.    Lab Results (last 72 hours)     Procedure Component Value Units Date/Time    Basic Metabolic Panel [[id number]]  (Abnormal) Collected: 11/05/20 0425    Specimen: Blood Updated: 11/05/20 0458     Glucose 117 mg/dL      BUN 14 mg/dL      Creatinine 0.71 mg/dL      Sodium 140 mmol/L      Potassium 4.1 mmol/L      Chloride 103 mmol/L      CO2 28.0 mmol/L      Calcium 9.0 mg/dL      eGFR Non African Amer 117 mL/min/1.73      BUN/Creatinine Ratio 19.7     Anion Gap 9.0 mmol/L     Narrative:      GFR Normal >60  Chronic Kidney Disease <60  Kidney Failure <15       CBC & Differential [[id number]]  (Abnormal) Collected: 11/05/20 0425    Specimen: Blood Updated: 11/05/20 0439    Narrative:      The following orders were created for panel order CBC & Differential.  Procedure                               Abnormality         Status                     ---------                               -----------         ------                     CBC Auto Differential[[id number]]        Abnormal            Final result                 Please view results for these tests on the individual orders.    CBC Auto Differential [[id number]]  (Abnormal) Collected: 11/05/20 0425    Specimen: Blood Updated: 11/05/20 0439     WBC 11.44 10*3/mm3      RBC 4.48 10*6/mm3      Hemoglobin 13.2 g/dL      Hematocrit 39.2 %      MCV 87.5 fL      MCH 29.5 pg      MCHC 33.7 g/dL      RDW 13.2 %      RDW-SD 41.9 fl      MPV 10.6 fL      Platelets 220 10*3/mm3      Neutrophil % 66.1 %      Lymphocyte % 16.2 %      Monocyte % 9.4 %      Eosinophil % 7.5 %      Basophil % 0.3 %      Immature Grans % 0.5 %      Neutrophils, Absolute 7.56 10*3/mm3      Lymphocytes, Absolute 1.85 10*3/mm3      Monocytes, Absolute 1.07 10*3/mm3      Eosinophils, Absolute 0.86 10*3/mm3      Basophils, Absolute 0.04 10*3/mm3      Immature Grans, Absolute 0.06 10*3/mm3      nRBC 0.0 /100 WBC     CBC & Differential [[id number]]  (Abnormal) Collected: 11/04/20 0354    Specimen: Blood Updated: 11/04/20 0542    Narrative:      The following orders were created for panel order CBC & Differential.  Procedure                               Abnormality         Status                     ---------                               -----------         ------                     CBC Auto Differential[[id number]]        Abnormal            Final result                 Please view results for these tests on the individual orders.    CBC Auto Differential [[id number]]  (Abnormal) Collected: 11/04/20 0354    Specimen: Blood Updated: 11/04/20 0542     WBC 13.48  10*3/mm3      RBC 4.97 10*6/mm3      Hemoglobin 14.5 g/dL      Hematocrit 44.4 %      MCV 89.3 fL      MCH 29.2 pg      MCHC 32.7 g/dL      RDW 13.2 %      RDW-SD 43.0 fl      MPV 11.1 fL      Platelets 245 10*3/mm3      Neutrophil % 68.2 %      Lymphocyte % 15.5 %      Monocyte % 10.1 %      Eosinophil % 5.3 %      Basophil % 0.4 %      Immature Grans % 0.5 %      Neutrophils, Absolute 9.20 10*3/mm3      Lymphocytes, Absolute 2.09 10*3/mm3      Monocytes, Absolute 1.36 10*3/mm3      Eosinophils, Absolute 0.71 10*3/mm3      Basophils, Absolute 0.05 10*3/mm3      Immature Grans, Absolute 0.07 10*3/mm3      nRBC 0.0 /100 WBC     Sedimentation Rate [[id number]]  (Abnormal) Collected: 11/04/20 0354    Specimen: Blood Updated: 11/04/20 0526     Sed Rate 70 mm/hr     Basic Metabolic Panel [[id number]]  (Abnormal) Collected: 11/04/20 0354    Specimen: Blood Updated: 11/04/20 0518     Glucose 126 mg/dL      BUN 14 mg/dL      Creatinine 0.70 mg/dL      Sodium 137 mmol/L      Potassium 3.9 mmol/L      Chloride 100 mmol/L      CO2 28.0 mmol/L      Calcium 9.3 mg/dL      eGFR Non African Amer 118 mL/min/1.73      BUN/Creatinine Ratio 20.0     Anion Gap 9.0 mmol/L     Narrative:      GFR Normal >60  Chronic Kidney Disease <60  Kidney Failure <15      C-reactive Protein [[id number]]  (Abnormal) Collected: 11/03/20 1201    Specimen: Blood Updated: 11/03/20 1829     C-Reactive Protein 10.90 mg/dL     COVID PRE-OP / PRE-PROCEDURE SCREENING ORDER (NO ISOLATION) - Swab, Nasal Cavity [[id number]]  (Normal) Collected: 11/03/20 1529    Specimen: Swab from Nasal Cavity Updated: 11/03/20 1711    Narrative:      The following orders were created for panel order COVID PRE-OP / PRE-PROCEDURE SCREENING ORDER (NO ISOLATION) - Swab, Nasal Cavity.  Procedure                               Abnormality         Status                     ---------                               -----------         ------                     COVID-19,[facility]  IN-NAVI...[[id number]]  Normal              Final result                 Please view results for these tests on the individual orders.    COVID-19,[facility] IN-HOUSE,Nasal Swab No Transport Media 3-4 HR TAT - Swab, Nasal Cavity [[id number]]  (Normal) Collected: 11/03/20 1529    Specimen: Swab from Nasal Cavity Updated: 11/03/20 1711     COVID19 Not Detected    Narrative:      Fact sheet for providers: https://www.fda.gov/media/134205/download     Fact sheet for patients: https://www.fda.gov/media/109342/download    [place] Draw [[id number]] Collected: 11/03/20 1201    Specimen: Blood Updated: 11/03/20 1315    Narrative:      The following orders were created for panel order [place] Draw.  Procedure                               Abnormality         Status                     ---------                               -----------         ------                     Light Blue Top[[id number]]                                   Final result               Green Top (Gel)[[id number]]                                  Final result               Lavender Top[[id number]]                                     Final result               Red Top[[id number]]                                          Final result                 Please view results for these tests on the individual orders.    Light Blue Top [[id number]] Collected: 11/03/20 1201    Specimen: Blood Updated: 11/03/20 1315     Extra Tube hold for add-on     Comment: Auto resulted       Green Top (Gel) [[id number]] Collected: 11/03/20 1201    Specimen: Blood Updated: 11/03/20 1315     Extra Tube Hold for add-ons.     Comment: Auto resulted.       Red Top [[id number]] Collected: 11/03/20 1201    Specimen: Blood Updated: 11/03/20 1315     Extra Tube Hold for add-ons.     Comment: Auto resulted.       Lavender Top [[id number]] Collected: 11/03/20 1201    Specimen: Blood Updated: 11/03/20 1315     Extra Tube hold for add-on     Comment: Auto resulted       Protime-INR [[id number]]  (Normal)  Collected: 11/03/20 1201    Specimen: Blood Updated: 11/03/20 1302     Protime 12.6 Seconds      INR 0.98    aPTT [[id number]]  (Normal) Collected: 11/03/20 1201    Specimen: Blood Updated: 11/03/20 1302     PTT 32.0 seconds     Lipase [[id number]]  (Normal) Collected: 11/03/20 1201    Specimen: Blood Updated: 11/03/20 1302     Lipase 19 U/L     Comprehensive Metabolic Panel [[id number]]  (Abnormal) Collected: 11/03/20 1201    Specimen: Blood Updated: 11/03/20 1233     Glucose 125 mg/dL      BUN 12 mg/dL      Creatinine 0.65 mg/dL      Sodium 133 mmol/L      Potassium 4.1 mmol/L      Comment: Slight hemolysis detected by analyzer. Results may be affected.        Chloride 96 mmol/L      CO2 27.0 mmol/L      Calcium 9.7 mg/dL      Total Protein 7.9 g/dL      Albumin 4.30 g/dL      ALT (SGPT) 33 U/L      AST (SGOT) 25 U/L      Alkaline Phosphatase 93 U/L      Total Bilirubin 0.9 mg/dL      eGFR Non African Amer 129 mL/min/1.73      Globulin 3.6 gm/dL      A/G Ratio 1.2 g/dL      BUN/Creatinine Ratio 18.5     Anion Gap 10.0 mmol/L     Narrative:      GFR Normal >60  Chronic Kidney Disease <60  Kidney Failure <15      BNP [[id number]]  (Normal) Collected: 11/03/20 1201    Specimen: Blood Updated: 11/03/20 1233     proBNP 67.4 pg/mL     Narrative:      Among patients with dyspnea, NT-proBNP is highly sensitive for the detection of acute congestive heart failure. In addition NT-proBNP of <300 pg/ml effectively rules out acute congestive heart failure with 99% negative predictive value.    Results may be falsely decreased if patient taking Biotin.      Troponin [[id number]]  (Normal) Collected: 11/03/20 1201    Specimen: Blood Updated: 11/03/20 1233     Troponin T <0.010 ng/mL     Narrative:      Troponin T Reference Range:  <= 0.03 ng/mL-   Negative for AMI  >0.03 ng/mL-     Abnormal for myocardial necrosis.  Clinicians would have to utilize clinical acumen, EKG, Troponin and serial changes to determine if it is an Acute  Myocardial Infarction or myocardial injury due to an underlying chronic condition.       Results may be falsely decreased if patient taking Biotin.      CBC & Differential [[id number]]  (Abnormal) Collected: 11/03/20 1201    Specimen: Blood Updated: 11/03/20 1213    Narrative:      The following orders were created for panel order CBC & Differential.  Procedure                               Abnormality         Status                     ---------                               -----------         ------                     CBC Auto Differential[[id number]]        Abnormal            Final result                 Please view results for these tests on the individual orders.    CBC Auto Differential [[id number]]  (Abnormal) Collected: 11/03/20 1201    Specimen: Blood Updated: 11/03/20 1213     WBC 17.10 10*3/mm3      RBC 5.24 10*6/mm3      Hemoglobin 15.5 g/dL      Hematocrit 44.7 %      MCV 85.3 fL      MCH 29.6 pg      MCHC 34.7 g/dL      RDW 13.2 %      RDW-SD 40.7 fl      MPV 10.9 fL      Platelets 263 10*3/mm3      Neutrophil % 76.0 %      Lymphocyte % 12.3 %      Monocyte % 8.7 %      Eosinophil % 2.2 %      Basophil % 0.3 %      Immature Grans % 0.5 %      Neutrophils, Absolute 13.01 10*3/mm3      Lymphocytes, Absolute 2.10 10*3/mm3      Monocytes, Absolute 1.49 10*3/mm3      Eosinophils, Absolute 0.37 10*3/mm3      Basophils, Absolute 0.05 10*3/mm3      Immature Grans, Absolute 0.08 10*3/mm3      nRBC 0.0 /100 WBC         Imaging Results (Last 72 Hours)     Procedure Component Value Units Date/Time    CT Angiogram Chest [[id number]] Collected: 11/03/20 1455     Updated: 11/03/20 1500    Narrative:      EXAMINATION: CT ANGIOGRAM CHEST-      11/3/2020 2:26 PM CST     HISTORY: Recent abdominal laparoscopic surgery now with shortness of  breath and bilateral upper chest pain     In order to have a CT radiation dose as low as reasonably achievable  Automated Exposure Control was utilized for adjustment of the mA  and/or  KV according to patient size.     DLP in mGycm= 269.     CT angiography protocol.   CT imaging with bolus IV contrast injection.   Under concurrent supervision axial, sagittal, coronal, and  three-dimensional data sets were constructed.     Normal heart size.  Normal thoracic aorta with no aneurysm or dissection.     Normal and symmetric pulmonary arteries.  No pulmonary embolism.     Mild bibasilar atelectasis.  Upper lobes are clear.  No pneumothorax or pleural effusion.     Postoperative free air noted within within the upper abdomen.  Abdomen/pelvis CT dictated separately.     Summary:  1. No pulmonary embolism.  2. Mild basilar atelectasis.                                   This report was finalized on 11/03/2020 14:57 by Dr. Deon Hernandez MD.    CT Abdomen Pelvis With Contrast [[id number]] Collected: 11/03/20 1439     Updated: 11/03/20 1458    Narrative:      EXAMINATION: CT ABDOMEN PELVIS W CONTRAST-      11/3/2020 2:26 PM CST     HISTORY: Recent diverticulitis to surgery now with elevated white count  and abdominal pain     In order to have a CT radiation dose as low as reasonably achievable  Automated Exposure Control was utilized for adjustment of the mA and/or  KV according to patient size.     DLP in mGycm= 410.     Abdomen pelvis CT with IV contrast injection.     This patient is 4 days status post sigmoid colectomy for diverticulitis.     Intraperitoneal air.  Scattered foci of air within the mesentery.     Low sigmoid anastomotic line noted.  No pelvic abscess.     There is residual sigmoid colon wall thickening with surrounding  inflammation in the left side of the pelvis.     There is focal splenic flexure colonic wall thickening with some  adjacent mesenteric air and edema. (Axial images 25 through 33)  The pericolonic edema at left upper quadrant represents inflammatory  change which may be due to focal colitis or diverticulitis.  Focal mesenteric air in this location is compatible with  small contained  perforation. There is no drainable abscess collection at this site.     No small bowel obstruction.  The appendix is visualized. There is no sign of appendicitis.     Heart size is normal.  Mild atelectasis at the lung bases.  No sign of pneumonia.     Normal liver and gallbladder.  Normal pancreas and spleen.  Normal and symmetric adrenal glands.  Bilateral renal cysts.  Right renal cyst = 27 x 24 mm.  Cysts within the left kidney measure up to 19 x 13 mm.     Normal abdominal aorta.     Summary:  1. Postsurgical changes with intra-abdominal air.  2. Inflammatory change and sigmoid colon wall thickening at the left  lower quadrant. CT appearance of residual diverticulitis.  2. No drainable abscess collection.  3. Focal inflammatory change and focal extraluminal air associated with  focal colonic wall thickening at the splenic flexure. This appearance is  compatible with acute inflammatory change and localized perforation.  There is no abscess at this site.  4. Normal appearance of the rectosigmoid anastomosis.                                   This report was finalized on 11/03/2020 14:55 by Dr. Deon Hernandez MD.    XR Chest 1 View [[id number]] Collected: 11/03/20 1358     Updated: 11/03/20 1403    Narrative:      EXAMINATION: XR CHEST 1 VW-     11/3/2020 1:30 PM CST     HISTORY: Short of breath. Shoulder pain.     1 view chest x-ray with no comparison.     Heart size is normal.  The mediastinum is within normal limits.      The lungs are normally expanded with no pneumonia or pneumothorax.     No congestive failure changes.                                                                       Impression:      1. No acute disease.           This report was finalized on 11/03/2020 14:00 by Dr. Deon Hernandez MD.              Assessment/Plan       Diverticulitis      Continue the Toradol.  Will begin clear liquids slowly      Gretchen Martinez MD  11/05/20  07:27 CST

## 2020-11-05 NOTE — PLAN OF CARE
Goal Outcome Evaluation:  Plan of Care Reviewed With: patient  Progress: no change  Outcome Summary: C/o pain x1. Pt tolerating toradol. Seems to be drowsy today. Tolerating clears. Will cont to monitor

## 2020-11-06 LAB
ANION GAP SERPL CALCULATED.3IONS-SCNC: 10 MMOL/L (ref 5–15)
BASOPHILS # BLD AUTO: 0.05 10*3/MM3 (ref 0–0.2)
BASOPHILS NFR BLD AUTO: 0.6 % (ref 0–1.5)
BUN SERPL-MCNC: 10 MG/DL (ref 6–20)
BUN/CREAT SERPL: 16.1 (ref 7–25)
CALCIUM SPEC-SCNC: 9.2 MG/DL (ref 8.6–10.5)
CHLORIDE SERPL-SCNC: 106 MMOL/L (ref 98–107)
CO2 SERPL-SCNC: 26 MMOL/L (ref 22–29)
CREAT SERPL-MCNC: 0.62 MG/DL (ref 0.76–1.27)
DEPRECATED RDW RBC AUTO: 41.9 FL (ref 37–54)
EOSINOPHIL # BLD AUTO: 0.82 10*3/MM3 (ref 0–0.4)
EOSINOPHIL NFR BLD AUTO: 9.5 % (ref 0.3–6.2)
ERYTHROCYTE [DISTWIDTH] IN BLOOD BY AUTOMATED COUNT: 13 % (ref 12.3–15.4)
GFR SERPL CREATININE-BSD FRML MDRD: 136 ML/MIN/1.73
GLUCOSE SERPL-MCNC: 119 MG/DL (ref 65–99)
HCT VFR BLD AUTO: 37.5 % (ref 37.5–51)
HGB BLD-MCNC: 12.5 G/DL (ref 13–17.7)
IMM GRANULOCYTES # BLD AUTO: 0.05 10*3/MM3 (ref 0–0.05)
IMM GRANULOCYTES NFR BLD AUTO: 0.6 % (ref 0–0.5)
LYMPHOCYTES # BLD AUTO: 2.15 10*3/MM3 (ref 0.7–3.1)
LYMPHOCYTES NFR BLD AUTO: 24.9 % (ref 19.6–45.3)
MCH RBC QN AUTO: 29.5 PG (ref 26.6–33)
MCHC RBC AUTO-ENTMCNC: 33.3 G/DL (ref 31.5–35.7)
MCV RBC AUTO: 88.4 FL (ref 79–97)
MONOCYTES # BLD AUTO: 0.89 10*3/MM3 (ref 0.1–0.9)
MONOCYTES NFR BLD AUTO: 10.3 % (ref 5–12)
NEUTROPHILS NFR BLD AUTO: 4.68 10*3/MM3 (ref 1.7–7)
NEUTROPHILS NFR BLD AUTO: 54.1 % (ref 42.7–76)
NRBC BLD AUTO-RTO: 0 /100 WBC (ref 0–0.2)
PLATELET # BLD AUTO: 236 10*3/MM3 (ref 140–450)
PMV BLD AUTO: 11.1 FL (ref 6–12)
POTASSIUM SERPL-SCNC: 3.9 MMOL/L (ref 3.5–5.2)
RBC # BLD AUTO: 4.24 10*6/MM3 (ref 4.14–5.8)
SODIUM SERPL-SCNC: 142 MMOL/L (ref 136–145)
WBC # BLD AUTO: 8.64 10*3/MM3 (ref 3.4–10.8)

## 2020-11-06 PROCEDURE — 85025 COMPLETE CBC W/AUTO DIFF WBC: CPT | Performed by: SPECIALIST

## 2020-11-06 PROCEDURE — 94799 UNLISTED PULMONARY SVC/PX: CPT

## 2020-11-06 PROCEDURE — 25010000002 KETOROLAC TROMETHAMINE PER 15 MG: Performed by: SPECIALIST

## 2020-11-06 PROCEDURE — 80048 BASIC METABOLIC PNL TOTAL CA: CPT | Performed by: SPECIALIST

## 2020-11-06 PROCEDURE — 25010000002 PIPERACILLIN SOD-TAZOBACTAM PER 1 G: Performed by: SPECIALIST

## 2020-11-06 RX ADMIN — SODIUM CHLORIDE, PRESERVATIVE FREE 10 ML: 5 INJECTION INTRAVENOUS at 09:25

## 2020-11-06 RX ADMIN — IPRATROPIUM BROMIDE AND ALBUTEROL SULFATE 3 ML: 2.5; .5 SOLUTION RESPIRATORY (INHALATION) at 10:56

## 2020-11-06 RX ADMIN — IPRATROPIUM BROMIDE AND ALBUTEROL SULFATE 3 ML: 2.5; .5 SOLUTION RESPIRATORY (INHALATION) at 22:20

## 2020-11-06 RX ADMIN — KETOROLAC TROMETHAMINE 15 MG: 30 INJECTION, SOLUTION INTRAMUSCULAR at 04:08

## 2020-11-06 RX ADMIN — SODIUM CHLORIDE, SODIUM LACTATE, POTASSIUM CHLORIDE, CALCIUM CHLORIDE AND DEXTROSE MONOHYDRATE 100 ML/HR: 5; 600; 310; 30; 20 INJECTION, SOLUTION INTRAVENOUS at 07:54

## 2020-11-06 RX ADMIN — KETOROLAC TROMETHAMINE 15 MG: 30 INJECTION, SOLUTION INTRAMUSCULAR at 20:39

## 2020-11-06 RX ADMIN — TAZOBACTAM SODIUM AND PIPERACILLIN SODIUM 3.38 G: 375; 3 INJECTION, SOLUTION INTRAVENOUS at 17:37

## 2020-11-06 RX ADMIN — TAZOBACTAM SODIUM AND PIPERACILLIN SODIUM 3.38 G: 375; 3 INJECTION, SOLUTION INTRAVENOUS at 01:01

## 2020-11-06 RX ADMIN — KETOROLAC TROMETHAMINE 15 MG: 30 INJECTION, SOLUTION INTRAMUSCULAR at 12:05

## 2020-11-06 RX ADMIN — IPRATROPIUM BROMIDE AND ALBUTEROL SULFATE 3 ML: 2.5; .5 SOLUTION RESPIRATORY (INHALATION) at 06:18

## 2020-11-06 RX ADMIN — TAZOBACTAM SODIUM AND PIPERACILLIN SODIUM 3.38 G: 375; 3 INJECTION, SOLUTION INTRAVENOUS at 09:25

## 2020-11-06 NOTE — PROGRESS NOTES
Gretchen Martinez MD Progress Note     LOS: 3 days   Patient Care Team:  Ivone Trujillo APRN as PCP - General (Family Medicine)  Epifanio Sandhu MD as Consulting Physician (Gastroenterology)        Subjective     He is feeling much better.  Had a bowel movement.  Tolerating liquids    Objective     Vital Signs  Temp:  [97.6 °F (36.4 °C)-97.8 °F (36.6 °C)] 97.8 °F (36.6 °C)  Heart Rate:  [68-90] 68  Resp:  [16-18] 16  BP: (117-120)/(61-63) 120/63    Intake & Output (last 3 days)       11/03 0701 - 11/04 0700 11/04 0701 - 11/05 0700 11/05 0701 - 11/06 0700 11/06 0701 - 11/07 0700    P.O.   510     I.V. (mL/kg)  4104 (44.8) 1963 (21.4) 988 (10.8)    IV Piggyback  274.4      Total Intake(mL/kg)  4378.4 (47.8) 2473 (27) 988 (10.8)    Urine (mL/kg/hr) 300 1975 (0.9) 3100 (1.4)     Stool   0     Total Output 300 1975 3100     Net -300 +2403.4 -627 +988            Stool Unmeasured Occurrence   1 x           Physical Exam:     General Appearance:    Alert, cooperative, in no acute distress   Lungs:     respirations regular, even and unlabored    Heart:    Regular rhythm and normal rate, normal S1 and S2, no            murmur, no gallop, no rub   Chest Wall:    No abnormalities observed   Abdomen:      Soft no tenderness wounds look good no significant distention   Extremities: No edema,    Results Review:     I reviewed the patient's new clinical results.    Lab Results (last 72 hours)     Procedure Component Value Units Date/Time    Basic Metabolic Panel [[id number]]  (Abnormal) Collected: 11/06/20 0353    Specimen: Blood Updated: 11/06/20 0526     Glucose 119 mg/dL      BUN 10 mg/dL      Creatinine 0.62 mg/dL      Sodium 142 mmol/L      Potassium 3.9 mmol/L      Chloride 106 mmol/L      CO2 26.0 mmol/L      Calcium 9.2 mg/dL      eGFR Non African Amer 136 mL/min/1.73      BUN/Creatinine Ratio 16.1     Anion Gap 10.0 mmol/L     Narrative:      GFR Normal >60  Chronic Kidney Disease <60  Kidney Failure <15      CBC &  Differential [[id number]]  (Abnormal) Collected: 11/06/20 0353    Specimen: Blood Updated: 11/06/20 0501    Narrative:      The following orders were created for panel order CBC & Differential.  Procedure                               Abnormality         Status                     ---------                               -----------         ------                     CBC Auto Differential[[id number]]        Abnormal            Final result                 Please view results for these tests on the individual orders.    CBC Auto Differential [[id number]]  (Abnormal) Collected: 11/06/20 0353    Specimen: Blood Updated: 11/06/20 0501     WBC 8.64 10*3/mm3      RBC 4.24 10*6/mm3      Hemoglobin 12.5 g/dL      Hematocrit 37.5 %      MCV 88.4 fL      MCH 29.5 pg      MCHC 33.3 g/dL      RDW 13.0 %      RDW-SD 41.9 fl      MPV 11.1 fL      Platelets 236 10*3/mm3      Neutrophil % 54.1 %      Lymphocyte % 24.9 %      Monocyte % 10.3 %      Eosinophil % 9.5 %      Basophil % 0.6 %      Immature Grans % 0.6 %      Neutrophils, Absolute 4.68 10*3/mm3      Lymphocytes, Absolute 2.15 10*3/mm3      Monocytes, Absolute 0.89 10*3/mm3      Eosinophils, Absolute 0.82 10*3/mm3      Basophils, Absolute 0.05 10*3/mm3      Immature Grans, Absolute 0.05 10*3/mm3      nRBC 0.0 /100 WBC     Basic Metabolic Panel [[id number]]  (Abnormal) Collected: 11/05/20 0425    Specimen: Blood Updated: 11/05/20 0458     Glucose 117 mg/dL      BUN 14 mg/dL      Creatinine 0.71 mg/dL      Sodium 140 mmol/L      Potassium 4.1 mmol/L      Chloride 103 mmol/L      CO2 28.0 mmol/L      Calcium 9.0 mg/dL      eGFR Non African Amer 117 mL/min/1.73      BUN/Creatinine Ratio 19.7     Anion Gap 9.0 mmol/L     Narrative:      GFR Normal >60  Chronic Kidney Disease <60  Kidney Failure <15      CBC & Differential [[id number]]  (Abnormal) Collected: 11/05/20 0425    Specimen: Blood Updated: 11/05/20 0439    Narrative:      The following orders were created for panel order  CBC & Differential.  Procedure                               Abnormality         Status                     ---------                               -----------         ------                     CBC Auto Differential[[id number]]        Abnormal            Final result                 Please view results for these tests on the individual orders.    CBC Auto Differential [[id number]]  (Abnormal) Collected: 11/05/20 0425    Specimen: Blood Updated: 11/05/20 0439     WBC 11.44 10*3/mm3      RBC 4.48 10*6/mm3      Hemoglobin 13.2 g/dL      Hematocrit 39.2 %      MCV 87.5 fL      MCH 29.5 pg      MCHC 33.7 g/dL      RDW 13.2 %      RDW-SD 41.9 fl      MPV 10.6 fL      Platelets 220 10*3/mm3      Neutrophil % 66.1 %      Lymphocyte % 16.2 %      Monocyte % 9.4 %      Eosinophil % 7.5 %      Basophil % 0.3 %      Immature Grans % 0.5 %      Neutrophils, Absolute 7.56 10*3/mm3      Lymphocytes, Absolute 1.85 10*3/mm3      Monocytes, Absolute 1.07 10*3/mm3      Eosinophils, Absolute 0.86 10*3/mm3      Basophils, Absolute 0.04 10*3/mm3      Immature Grans, Absolute 0.06 10*3/mm3      nRBC 0.0 /100 WBC     CBC & Differential [[id number]]  (Abnormal) Collected: 11/04/20 0354    Specimen: Blood Updated: 11/04/20 0542    Narrative:      The following orders were created for panel order CBC & Differential.  Procedure                               Abnormality         Status                     ---------                               -----------         ------                     CBC Auto Differential[[id number]]        Abnormal            Final result                 Please view results for these tests on the individual orders.    CBC Auto Differential [[id number]]  (Abnormal) Collected: 11/04/20 0354    Specimen: Blood Updated: 11/04/20 0542     WBC 13.48 10*3/mm3      RBC 4.97 10*6/mm3      Hemoglobin 14.5 g/dL      Hematocrit 44.4 %      MCV 89.3 fL      MCH 29.2 pg      MCHC 32.7 g/dL      RDW 13.2 %      RDW-SD 43.0 fl      MPV 11.1  fL      Platelets 245 10*3/mm3      Neutrophil % 68.2 %      Lymphocyte % 15.5 %      Monocyte % 10.1 %      Eosinophil % 5.3 %      Basophil % 0.4 %      Immature Grans % 0.5 %      Neutrophils, Absolute 9.20 10*3/mm3      Lymphocytes, Absolute 2.09 10*3/mm3      Monocytes, Absolute 1.36 10*3/mm3      Eosinophils, Absolute 0.71 10*3/mm3      Basophils, Absolute 0.05 10*3/mm3      Immature Grans, Absolute 0.07 10*3/mm3      nRBC 0.0 /100 WBC     Sedimentation Rate [[id number]]  (Abnormal) Collected: 11/04/20 0354    Specimen: Blood Updated: 11/04/20 0526     Sed Rate 70 mm/hr     Basic Metabolic Panel [[id number]]  (Abnormal) Collected: 11/04/20 0354    Specimen: Blood Updated: 11/04/20 0518     Glucose 126 mg/dL      BUN 14 mg/dL      Creatinine 0.70 mg/dL      Sodium 137 mmol/L      Potassium 3.9 mmol/L      Chloride 100 mmol/L      CO2 28.0 mmol/L      Calcium 9.3 mg/dL      eGFR Non African Amer 118 mL/min/1.73      BUN/Creatinine Ratio 20.0     Anion Gap 9.0 mmol/L     Narrative:      GFR Normal >60  Chronic Kidney Disease <60  Kidney Failure <15      C-reactive Protein [[id number]]  (Abnormal) Collected: 11/03/20 1201    Specimen: Blood Updated: 11/03/20 1829     C-Reactive Protein 10.90 mg/dL     COVID PRE-OP / PRE-PROCEDURE SCREENING ORDER (NO ISOLATION) - Swab, Nasal Cavity [[id number]]  (Normal) Collected: 11/03/20 1529    Specimen: Swab from Nasal Cavity Updated: 11/03/20 1711    Narrative:      The following orders were created for panel order COVID PRE-OP / PRE-PROCEDURE SCREENING ORDER (NO ISOLATION) - Swab, Nasal Cavity.  Procedure                               Abnormality         Status                     ---------                               -----------         ------                     COVID-19,[facility] IN-NAVI...[[id number]]  Normal              Final result                 Please view results for these tests on the individual orders.    COVID-19,[facility] IN-HOUSE,Nasal Swab No Transport Media 3-4  HR TAT - Swab, Nasal Cavity [[id number]]  (Normal) Collected: 11/03/20 1529    Specimen: Swab from Nasal Cavity Updated: 11/03/20 1711     COVID19 Not Detected    Narrative:      Fact sheet for providers: https://www.fda.gov/media/764208/download     Fact sheet for patients: https://www.fda.gov/media/383015/download    [place] Draw [[id number]] Collected: 11/03/20 1201    Specimen: Blood Updated: 11/03/20 1315    Narrative:      The following orders were created for panel order [place] Draw.  Procedure                               Abnormality         Status                     ---------                               -----------         ------                     Light Blue Top[[id number]]                                   Final result               Green Top (Gel)[[id number]]                                  Final result               Lavender Top[[id number]]                                     Final result               Red Top[[id number]]                                          Final result                 Please view results for these tests on the individual orders.    Light Blue Top [[id number]] Collected: 11/03/20 1201    Specimen: Blood Updated: 11/03/20 1315     Extra Tube hold for add-on     Comment: Auto resulted       Green Top (Gel) [[id number]] Collected: 11/03/20 1201    Specimen: Blood Updated: 11/03/20 1315     Extra Tube Hold for add-ons.     Comment: Auto resulted.       Red Top [[id number]] Collected: 11/03/20 1201    Specimen: Blood Updated: 11/03/20 1315     Extra Tube Hold for add-ons.     Comment: Auto resulted.       Lavender Top [[id number]] Collected: 11/03/20 1201    Specimen: Blood Updated: 11/03/20 1315     Extra Tube hold for add-on     Comment: Auto resulted       Protime-INR [[id number]]  (Normal) Collected: 11/03/20 1201    Specimen: Blood Updated: 11/03/20 1302     Protime 12.6 Seconds      INR 0.98    aPTT [[id number]]  (Normal) Collected: 11/03/20 1201    Specimen: Blood Updated: 11/03/20  1302     PTT 32.0 seconds     Lipase [[id number]]  (Normal) Collected: 11/03/20 1201    Specimen: Blood Updated: 11/03/20 1302     Lipase 19 U/L     Comprehensive Metabolic Panel [[id number]]  (Abnormal) Collected: 11/03/20 1201    Specimen: Blood Updated: 11/03/20 1233     Glucose 125 mg/dL      BUN 12 mg/dL      Creatinine 0.65 mg/dL      Sodium 133 mmol/L      Potassium 4.1 mmol/L      Comment: Slight hemolysis detected by analyzer. Results may be affected.        Chloride 96 mmol/L      CO2 27.0 mmol/L      Calcium 9.7 mg/dL      Total Protein 7.9 g/dL      Albumin 4.30 g/dL      ALT (SGPT) 33 U/L      AST (SGOT) 25 U/L      Alkaline Phosphatase 93 U/L      Total Bilirubin 0.9 mg/dL      eGFR Non African Amer 129 mL/min/1.73      Globulin 3.6 gm/dL      A/G Ratio 1.2 g/dL      BUN/Creatinine Ratio 18.5     Anion Gap 10.0 mmol/L     Narrative:      GFR Normal >60  Chronic Kidney Disease <60  Kidney Failure <15      BNP [[id number]]  (Normal) Collected: 11/03/20 1201    Specimen: Blood Updated: 11/03/20 1233     proBNP 67.4 pg/mL     Narrative:      Among patients with dyspnea, NT-proBNP is highly sensitive for the detection of acute congestive heart failure. In addition NT-proBNP of <300 pg/ml effectively rules out acute congestive heart failure with 99% negative predictive value.    Results may be falsely decreased if patient taking Biotin.      Troponin [[id number]]  (Normal) Collected: 11/03/20 1201    Specimen: Blood Updated: 11/03/20 1233     Troponin T <0.010 ng/mL     Narrative:      Troponin T Reference Range:  <= 0.03 ng/mL-   Negative for AMI  >0.03 ng/mL-     Abnormal for myocardial necrosis.  Clinicians would have to utilize clinical acumen, EKG, Troponin and serial changes to determine if it is an Acute Myocardial Infarction or myocardial injury due to an underlying chronic condition.       Results may be falsely decreased if patient taking Biotin.      CBC & Differential [[id number]]  (Abnormal)  Collected: 11/03/20 1201    Specimen: Blood Updated: 11/03/20 1213    Narrative:      The following orders were created for panel order CBC & Differential.  Procedure                               Abnormality         Status                     ---------                               -----------         ------                     CBC Auto Differential[[id number]]        Abnormal            Final result                 Please view results for these tests on the individual orders.    CBC Auto Differential [[id number]]  (Abnormal) Collected: 11/03/20 1201    Specimen: Blood Updated: 11/03/20 1213     WBC 17.10 10*3/mm3      RBC 5.24 10*6/mm3      Hemoglobin 15.5 g/dL      Hematocrit 44.7 %      MCV 85.3 fL      MCH 29.6 pg      MCHC 34.7 g/dL      RDW 13.2 %      RDW-SD 40.7 fl      MPV 10.9 fL      Platelets 263 10*3/mm3      Neutrophil % 76.0 %      Lymphocyte % 12.3 %      Monocyte % 8.7 %      Eosinophil % 2.2 %      Basophil % 0.3 %      Immature Grans % 0.5 %      Neutrophils, Absolute 13.01 10*3/mm3      Lymphocytes, Absolute 2.10 10*3/mm3      Monocytes, Absolute 1.49 10*3/mm3      Eosinophils, Absolute 0.37 10*3/mm3      Basophils, Absolute 0.05 10*3/mm3      Immature Grans, Absolute 0.08 10*3/mm3      nRBC 0.0 /100 WBC         Imaging Results (Last 72 Hours)     Procedure Component Value Units Date/Time    CT Angiogram Chest [[id number]] Collected: 11/03/20 1455     Updated: 11/03/20 1500    Narrative:      EXAMINATION: CT ANGIOGRAM CHEST-      11/3/2020 2:26 PM CST     HISTORY: Recent abdominal laparoscopic surgery now with shortness of  breath and bilateral upper chest pain     In order to have a CT radiation dose as low as reasonably achievable  Automated Exposure Control was utilized for adjustment of the mA and/or  KV according to patient size.     DLP in mGycm= 269.     CT angiography protocol.   CT imaging with bolus IV contrast injection.   Under concurrent supervision axial, sagittal, coronal,  and  three-dimensional data sets were constructed.     Normal heart size.  Normal thoracic aorta with no aneurysm or dissection.     Normal and symmetric pulmonary arteries.  No pulmonary embolism.     Mild bibasilar atelectasis.  Upper lobes are clear.  No pneumothorax or pleural effusion.     Postoperative free air noted within within the upper abdomen.  Abdomen/pelvis CT dictated separately.     Summary:  1. No pulmonary embolism.  2. Mild basilar atelectasis.                                   This report was finalized on 11/03/2020 14:57 by Dr. Deon Hernandez MD.    CT Abdomen Pelvis With Contrast [[id number]] Collected: 11/03/20 1439     Updated: 11/03/20 1458    Narrative:      EXAMINATION: CT ABDOMEN PELVIS W CONTRAST-      11/3/2020 2:26 PM CST     HISTORY: Recent diverticulitis to surgery now with elevated white count  and abdominal pain     In order to have a CT radiation dose as low as reasonably achievable  Automated Exposure Control was utilized for adjustment of the mA and/or  KV according to patient size.     DLP in mGycm= 410.     Abdomen pelvis CT with IV contrast injection.     This patient is 4 days status post sigmoid colectomy for diverticulitis.     Intraperitoneal air.  Scattered foci of air within the mesentery.     Low sigmoid anastomotic line noted.  No pelvic abscess.     There is residual sigmoid colon wall thickening with surrounding  inflammation in the left side of the pelvis.     There is focal splenic flexure colonic wall thickening with some  adjacent mesenteric air and edema. (Axial images 25 through 33)  The pericolonic edema at left upper quadrant represents inflammatory  change which may be due to focal colitis or diverticulitis.  Focal mesenteric air in this location is compatible with small contained  perforation. There is no drainable abscess collection at this site.     No small bowel obstruction.  The appendix is visualized. There is no sign of appendicitis.     Heart size is  normal.  Mild atelectasis at the lung bases.  No sign of pneumonia.     Normal liver and gallbladder.  Normal pancreas and spleen.  Normal and symmetric adrenal glands.  Bilateral renal cysts.  Right renal cyst = 27 x 24 mm.  Cysts within the left kidney measure up to 19 x 13 mm.     Normal abdominal aorta.     Summary:  1. Postsurgical changes with intra-abdominal air.  2. Inflammatory change and sigmoid colon wall thickening at the left  lower quadrant. CT appearance of residual diverticulitis.  2. No drainable abscess collection.  3. Focal inflammatory change and focal extraluminal air associated with  focal colonic wall thickening at the splenic flexure. This appearance is  compatible with acute inflammatory change and localized perforation.  There is no abscess at this site.  4. Normal appearance of the rectosigmoid anastomosis.                                   This report was finalized on 11/03/2020 14:55 by Dr. Deon Hernandez MD.    XR Chest 1 View [[id number]] Collected: 11/03/20 1358     Updated: 11/03/20 1403    Narrative:      EXAMINATION: XR CHEST 1 VW-     11/3/2020 1:30 PM CST     HISTORY: Short of breath. Shoulder pain.     1 view chest x-ray with no comparison.     Heart size is normal.  The mediastinum is within normal limits.      The lungs are normally expanded with no pneumonia or pneumothorax.     No congestive failure changes.                                                                       Impression:      1. No acute disease.           This report was finalized on 11/03/2020 14:00 by Dr. Deon Hernandez MD.              Assessment/Plan       Diverticulitis      Will advance to regular diet      Gretchen Martinez MD  11/06/20  08:39 CST

## 2020-11-06 NOTE — PLAN OF CARE
Problem: Adult Inpatient Plan of Care  Goal: Plan of Care Review  Flowsheets (Taken 11/6/2020 0302)  Progress: no change  Plan of Care Reviewed With: patient  Outcome Summary: Pt complains of mild discomfort. No complaints of nausea. Pt ambulated in the edgar multiple times. Drsg remain intact. Pt has one large BM. VSS. Will cont to monitor.

## 2020-11-07 VITALS
SYSTOLIC BLOOD PRESSURE: 128 MMHG | TEMPERATURE: 98 F | HEART RATE: 87 BPM | WEIGHT: 202 LBS | RESPIRATION RATE: 16 BRPM | OXYGEN SATURATION: 97 % | DIASTOLIC BLOOD PRESSURE: 78 MMHG | BODY MASS INDEX: 35.79 KG/M2 | HEIGHT: 63 IN

## 2020-11-07 LAB
ANION GAP SERPL CALCULATED.3IONS-SCNC: 10 MMOL/L (ref 5–15)
BASOPHILS # BLD AUTO: 0.05 10*3/MM3 (ref 0–0.2)
BASOPHILS NFR BLD AUTO: 0.5 % (ref 0–1.5)
BUN SERPL-MCNC: 14 MG/DL (ref 6–20)
BUN/CREAT SERPL: 19.2 (ref 7–25)
CALCIUM SPEC-SCNC: 9.4 MG/DL (ref 8.6–10.5)
CHLORIDE SERPL-SCNC: 106 MMOL/L (ref 98–107)
CO2 SERPL-SCNC: 25 MMOL/L (ref 22–29)
CREAT SERPL-MCNC: 0.73 MG/DL (ref 0.76–1.27)
DEPRECATED RDW RBC AUTO: 41.6 FL (ref 37–54)
EOSINOPHIL # BLD AUTO: 0.92 10*3/MM3 (ref 0–0.4)
EOSINOPHIL NFR BLD AUTO: 8.9 % (ref 0.3–6.2)
ERYTHROCYTE [DISTWIDTH] IN BLOOD BY AUTOMATED COUNT: 13.1 % (ref 12.3–15.4)
GFR SERPL CREATININE-BSD FRML MDRD: 113 ML/MIN/1.73
GLUCOSE SERPL-MCNC: 107 MG/DL (ref 65–99)
HCT VFR BLD AUTO: 39.6 % (ref 37.5–51)
HGB BLD-MCNC: 13.2 G/DL (ref 13–17.7)
IMM GRANULOCYTES # BLD AUTO: 0.04 10*3/MM3 (ref 0–0.05)
IMM GRANULOCYTES NFR BLD AUTO: 0.4 % (ref 0–0.5)
LYMPHOCYTES # BLD AUTO: 2.38 10*3/MM3 (ref 0.7–3.1)
LYMPHOCYTES NFR BLD AUTO: 23 % (ref 19.6–45.3)
MCH RBC QN AUTO: 29.2 PG (ref 26.6–33)
MCHC RBC AUTO-ENTMCNC: 33.3 G/DL (ref 31.5–35.7)
MCV RBC AUTO: 87.6 FL (ref 79–97)
MONOCYTES # BLD AUTO: 0.98 10*3/MM3 (ref 0.1–0.9)
MONOCYTES NFR BLD AUTO: 9.5 % (ref 5–12)
NEUTROPHILS NFR BLD AUTO: 5.96 10*3/MM3 (ref 1.7–7)
NEUTROPHILS NFR BLD AUTO: 57.7 % (ref 42.7–76)
NRBC BLD AUTO-RTO: 0 /100 WBC (ref 0–0.2)
PLATELET # BLD AUTO: 266 10*3/MM3 (ref 140–450)
PMV BLD AUTO: 11.2 FL (ref 6–12)
POTASSIUM SERPL-SCNC: 4.1 MMOL/L (ref 3.5–5.2)
RBC # BLD AUTO: 4.52 10*6/MM3 (ref 4.14–5.8)
SODIUM SERPL-SCNC: 141 MMOL/L (ref 136–145)
WBC # BLD AUTO: 10.33 10*3/MM3 (ref 3.4–10.8)

## 2020-11-07 PROCEDURE — 94799 UNLISTED PULMONARY SVC/PX: CPT

## 2020-11-07 PROCEDURE — 25010000002 KETOROLAC TROMETHAMINE PER 15 MG: Performed by: SPECIALIST

## 2020-11-07 PROCEDURE — 80048 BASIC METABOLIC PNL TOTAL CA: CPT | Performed by: SPECIALIST

## 2020-11-07 PROCEDURE — 36415 COLL VENOUS BLD VENIPUNCTURE: CPT | Performed by: SPECIALIST

## 2020-11-07 PROCEDURE — 25010000002 PIPERACILLIN SOD-TAZOBACTAM PER 1 G: Performed by: SPECIALIST

## 2020-11-07 PROCEDURE — 85025 COMPLETE CBC W/AUTO DIFF WBC: CPT | Performed by: SPECIALIST

## 2020-11-07 RX ORDER — AMOXICILLIN AND CLAVULANATE POTASSIUM 875; 125 MG/1; MG/1
1 TABLET, FILM COATED ORAL 2 TIMES DAILY
Qty: 14 TABLET | Refills: 0 | Status: SHIPPED | OUTPATIENT
Start: 2020-11-07 | End: 2020-11-14

## 2020-11-07 RX ADMIN — SODIUM CHLORIDE, SODIUM LACTATE, POTASSIUM CHLORIDE, CALCIUM CHLORIDE AND DEXTROSE MONOHYDRATE 50 ML/HR: 5; 600; 310; 30; 20 INJECTION, SOLUTION INTRAVENOUS at 00:12

## 2020-11-07 RX ADMIN — TAZOBACTAM SODIUM AND PIPERACILLIN SODIUM 3.38 G: 375; 3 INJECTION, SOLUTION INTRAVENOUS at 08:54

## 2020-11-07 RX ADMIN — IPRATROPIUM BROMIDE AND ALBUTEROL SULFATE 3 ML: 2.5; .5 SOLUTION RESPIRATORY (INHALATION) at 06:29

## 2020-11-07 RX ADMIN — TAZOBACTAM SODIUM AND PIPERACILLIN SODIUM 3.38 G: 375; 3 INJECTION, SOLUTION INTRAVENOUS at 02:09

## 2020-11-07 RX ADMIN — KETOROLAC TROMETHAMINE 15 MG: 30 INJECTION, SOLUTION INTRAMUSCULAR at 04:54

## 2020-11-07 RX ADMIN — SODIUM CHLORIDE, PRESERVATIVE FREE 10 ML: 5 INJECTION INTRAVENOUS at 08:54

## 2020-11-07 NOTE — PLAN OF CARE
Goal Outcome Evaluation:  Plan of Care Reviewed With: patient  Progress: no change  Outcome Summary: pt c/o of some discomfort only taking scheduled meds; up ad tammi; tolerating diet; cont to monitor

## 2020-11-07 NOTE — DISCHARGE SUMMARY
Gretchen Martinez MD Discharge Summary    Date of Admission: 11/3/2020  Date of Discharge:  11/7/2020    Discharge Diagnosis: Recurrent diverticulitis    Presenting Problem/History of Present Illness    History and Physical as outlined in the chart    Hospital Course  Patient was recently discharged after undergoing laparoscopic assisted takedown of splenic flexure and sigmoid colectomy for recurrent, persistent diverticulitis.  He went home on postoperative day 3.  He called the office on day 4 with complaints of shortness of breath and distention.  I referred him to the emergency room.  While there is noted to have leukocytosis and CT scan showed inflammatory changes with some small amount of air around the splenic flexure.  Concern for recurrent diverticulitis versus injury during laparoscopic takedown of the splenic flexure.  Patient was admitted and placed on IV antibiotics.  He improved slowly over the ensuing days.  Currently he is afebrile he is tolerating a regular diet his bowels are moving he is nontender his white count has returned to normal.  Patient will be discharged to home on Augmentin.  See medication reconciliation for medications at discharge.    Procedures Performed         Consults:   Consults     Date and Time Order Name Status Description    11/3/2020 1510 Inpatient General Surgery Consult Completed           Condition on Discharge:  stable      Discharge Disposition  Home or Self Care    Discharge Medications     Discharge Medications      New Medications      Instructions Start Date   amoxicillin-clavulanate 875-125 MG per tablet  Commonly known as: Augmentin   1 tablet, Oral, 2 Times Daily         Continue These Medications      Instructions Start Date   albuterol sulfate  (90 Base) MCG/ACT inhaler  Commonly known as: PROVENTIL HFA;VENTOLIN HFA;PROAIR HFA   2 puffs, Inhalation, Every 4 Hours PRN      oxyCODONE-acetaminophen  MG per tablet  Commonly known as: PERCOCET   1 tablet,  Oral, Every 4 Hours PRN             Discharge Diet:     Activity at Discharge:     Follow-up Appointments  No future appointments.  Additional Instructions for the Follow-ups that You Need to Schedule     Discharge Follow-up with Specified Provider: me   As directed      To: me    Follow Up Details: 10 days               Test Results Pending at Discharge       Gretchen Martinez MD  11/07/20  08:48 CST    Time: Time spent at discharge 30 minutes

## 2020-11-07 NOTE — PLAN OF CARE
Problem: Adult Inpatient Plan of Care  Goal: Plan of Care Review  Outcome: Ongoing, Not Progressing  Flowsheets (Taken 11/7/2020 0304)  Progress: no change  Plan of Care Reviewed With: patient  Outcome Summary: Pt complains of mild discomfort at times. No complaints of nausea. Voiding. Ambulating. Pt had a shower. IVF and IV abx. VSS. Will cont to monitor.

## 2020-11-08 ENCOUNTER — READMISSION MANAGEMENT (OUTPATIENT)
Dept: CALL CENTER | Facility: HOSPITAL | Age: 50
End: 2020-11-08

## 2020-11-08 NOTE — PAYOR COMM NOTE
"DC HOME 11-7-20    [id number]  Epifanio Nuno (50 y.o. Male)     Date of Birth Social Security Number Address Home Phone MRN    1970  [address] [place] [address] [phone] [id number]    Druze Marital Status          Other        Admission Date Admission Type Admitting Provider Attending Provider Department, Room/Bed    11/3/20 Emergency Gretchen Martinez MD  [facility] 3C, 362/1    Discharge Date Discharge Disposition Discharge Destination        11/7/2020 Home or Self Care              Attending Provider: (none)   Allergies: Hydrocodone, Hydrocodone-acetaminophen    Isolation: None   Infection: None   Code Status: Prior    Ht: 160 cm (63\")   Wt: 91.6 kg (202 lb)    Admission Cmt: None   Principal Problem: None                Active Insurance as of 11/3/2020     Primary Coverage     Payor Plan Insurance Group Employer/Plan Group    [employer] [id number]     Payor Plan Address Payor Plan Phone Number Payor Plan Fax Number Effective Dates    [address] [phone]  1/1/2020 - None Entered    [facility] [id number]       Subscriber Name Subscriber Birth Date Member ID       EPIFANIO NUNO 1970 [id number]                 Emergency Contacts      (Rel.) Home Phone Work Phone Mobile Phone    Cody Nuno (Spouse) [phone] -- [phone]    Twyla Mcclellan (Mother) [phone] -- [phone]               Discharge Summary      Gretchen Martinez MD at 11/07/20 0848          Gretchen Martinez MD Discharge Summary    Date of Admission: 11/3/2020  Date of Discharge:  11/7/2020    Discharge Diagnosis: Recurrent diverticulitis    Presenting Problem/History of Present Illness    History and Physical as outlined in the chart    Hospital Course  Patient was recently discharged after undergoing laparoscopic assisted takedown of splenic flexure and sigmoid colectomy for recurrent, persistent diverticulitis.  He went home on postoperative day " 3.  He called the office on day 4 with complaints of shortness of breath and distention.  I referred him to the emergency room.  While there is noted to have leukocytosis and CT scan showed inflammatory changes with some small amount of air around the splenic flexure.  Concern for recurrent diverticulitis versus injury during laparoscopic takedown of the splenic flexure.  Patient was admitted and placed on IV antibiotics.  He improved slowly over the ensuing days.  Currently he is afebrile he is tolerating a regular diet his bowels are moving he is nontender his white count has returned to normal.  Patient will be discharged to home on Augmentin.  See medication reconciliation for medications at discharge.    Procedures Performed         Consults:   Consults     Date and Time Order Name Status Description    11/3/2020 7940 Inpatient General Surgery Consult Completed           Condition on Discharge:  stable      Discharge Disposition  Home or Self Care    Discharge Medications     Discharge Medications      New Medications      Instructions Start Date   amoxicillin-clavulanate 875-125 MG per tablet  Commonly known as: Augmentin   1 tablet, Oral, 2 Times Daily         Continue These Medications      Instructions Start Date   albuterol sulfate  (90 Base) MCG/ACT inhaler  Commonly known as: PROVENTIL HFA;VENTOLIN HFA;PROAIR HFA   2 puffs, Inhalation, Every 4 Hours PRN      oxyCODONE-acetaminophen  MG per tablet  Commonly known as: PERCOCET   1 tablet, Oral, Every 4 Hours PRN             Discharge Diet:     Activity at Discharge:     Follow-up Appointments  No future appointments.  Additional Instructions for the Follow-ups that You Need to Schedule     Discharge Follow-up with Specified Provider: me   As directed      To: me    Follow Up Details: 10 days               Test Results Pending at Discharge       Gretchen Martinez MD  11/07/20  08:48 CST    Time: Time spent at discharge 30 minutes              Electronically signed by Gretchen Martinez MD at 11/07/20 4724

## 2020-11-08 NOTE — OUTREACH NOTE
Prep Survey      Responses   [facility] facility patient discharged from?  [facility]   Is LACE score < 7 ?  No   Eligibility  Readm Mgmt   Discharge diagnosis  Concern for recurrent diverticulitis versus injury during laparoscopic takedown of the splenic flexure   Does the patient have one of the following disease processes/diagnoses(primary or secondary)?  Other   Does the patient have Home health ordered?  No   Is there a DME ordered?  No   Comments regarding appointments  f/u apmts needed   Medication alerts for this patient  see AVS for changes   Prep survey completed?  Yes          Soraya Howard RN

## 2020-11-12 ENCOUNTER — READMISSION MANAGEMENT (OUTPATIENT)
Dept: CALL CENTER | Facility: HOSPITAL | Age: 50
End: 2020-11-12

## 2020-11-12 NOTE — OUTREACH NOTE
Medical Week 1 Survey      Responses   [facility] patient discharged from?  [facility]   Does the patient have one of the following disease processes/diagnoses(primary or secondary)?  Other   Week 1 attempt successful?  No   Unsuccessful attempts  Attempt 1          Neeru Jacobson RN

## 2020-11-16 ENCOUNTER — READMISSION MANAGEMENT (OUTPATIENT)
Dept: CALL CENTER | Facility: HOSPITAL | Age: 50
End: 2020-11-16

## 2020-11-16 NOTE — OUTREACH NOTE
Medical Week 1 Survey      Responses   [facility] patient discharged from?  [facility]   Does the patient have one of the following disease processes/diagnoses(primary or secondary)?  Other   Week 1 attempt successful?  Yes   Call start time  1436   Call end time  1437   Discharge diagnosis  Concern for recurrent diverticulitis versus injury during laparoscopic takedown of the splenic flexure   Meds reviewed with patient/caregiver?  Yes   Is the patient having any side effects they believe may be caused by any medication additions or changes?  No   Does the patient have all medications ordered at discharge?  Yes   Is the patient taking all medications as directed (includes completed medication regime)?  Yes   Comments regarding appointments  Appt with Dr. Martinez on 11/17/20   Does the patient have a primary care provider?   Yes   Does the patient have an appointment with their PCP within 7 days of discharge?  Yes   Comments regarding PCP  11/16/20   Has the patient kept scheduled appointments due by today?  N/A   Psychosocial issues?  No   Did the patient receive a copy of their discharge instructions?  Yes   Nursing interventions  Reviewed instructions with patient   What is the patient's perception of their health status since discharge?  Improving   Is the patient/caregiver able to teach back signs and symptoms related to disease process for when to call PCP?  Yes   Is the patient/caregiver able to teach back signs and symptoms related to disease process for when to call 911?  Yes   Is the patient/caregiver able to teach back the hierarchy of who to call/visit for symptoms/problems? PCP, Specialist, Home health nurse, Urgent Care, ED, 911  Yes   If the patient is a current smoker, are they able to teach back resources for cessation?  Not a smoker   Week 1 call completed?  Yes   Revoked  No further contact(revokes)-requires comment   Graduated/Revoked comments  Pt is doing well          Radha Nagel RN

## 2022-02-07 ENCOUNTER — OFFICE VISIT (OUTPATIENT)
Dept: GASTROENTEROLOGY | Facility: CLINIC | Age: 52
End: 2022-02-07

## 2022-02-07 VITALS
BODY MASS INDEX: 39.34 KG/M2 | SYSTOLIC BLOOD PRESSURE: 142 MMHG | WEIGHT: 222 LBS | HEIGHT: 63 IN | DIASTOLIC BLOOD PRESSURE: 88 MMHG | HEART RATE: 95 BPM | OXYGEN SATURATION: 98 % | TEMPERATURE: 97.3 F

## 2022-02-07 DIAGNOSIS — Z01.818 PREOPERATIVE TESTING: Primary | ICD-10-CM

## 2022-02-07 DIAGNOSIS — K62.5 BRBPR (BRIGHT RED BLOOD PER RECTUM): Primary | ICD-10-CM

## 2022-02-07 DIAGNOSIS — Z87.19 HISTORY OF BARRETT'S ESOPHAGUS: ICD-10-CM

## 2022-02-07 PROCEDURE — 99213 OFFICE O/P EST LOW 20 MIN: CPT | Performed by: INTERNAL MEDICINE

## 2022-02-07 RX ORDER — FAMOTIDINE 20 MG/1
40 TABLET, FILM COATED ORAL EVERY EVENING
Qty: 30 TABLET | Refills: 11 | Status: SHIPPED | OUTPATIENT
Start: 2022-02-07

## 2022-02-07 NOTE — PROGRESS NOTES
[facility]    Chief Complaint   Patient presents with   • GI Problem     chronic diverticulitis   • Colonoscopy   • Endoscopy   • Hemorrhoids       Subjective     HPI    Epifanio Crandall is a 51 y.o. male who presents with a chief complaint of rectal bleeding.    We last saw him in October 2020 was having trouble with recurrent diverticulitis.  He states he was having dental pain every month associated with arthralgias.  He did undergo sigmoid resection by Dr. Martinez in November 2020 for recurrent diverticular disease.  He states he is very happy had that surgery.  It relieved all of these symptoms.    When he does complain now is rectal bleeding.  He states sometimes he will feel the toilet bowl with what looks like blood.  Not passing any clots.  He will do this for several days.  Is been doing off and on for years.  He attributes it to internal hemorrhoids.  He last had a colonoscopy 2 years ago at an outside facility by Dr. Vallejo and was noted to have hemorrhoids.  He states he had his hemorrhoids banded in the past, has had other interventions by his surgeon.  He did discuss his hemorrhoids of Dr. Martinez at time of his surgery last year but Dr. Martinez did not recommend both procedures at the same time.  He has never tried any topical medications such as suppositories or any medicines.  He states he does not sit on the commode and read.  He denies diarrhea or constipation is not straining since he had his surgery.  That resolved.    He also carries a diagnosis of Franz's disease.  Denies any heartburn.  He states to have heartburn once in a while if eats late at night.  If he does he will take 1 Tums and that completely resolves the heartburn.  Is not very often.  Denies dysphagia.  Does have a bed at elevates and he uses it at times to stay elevated for the most part he does not eat late at night.  He did just get back from [place].  He states he gained some weight while he was down he  is planned to lose some weight.  Everything else is going well.          ================ copy of 10/13/2020 HPI=====================================    HPI     Epifanio Crandall is a 50 y.o. male who presents with a chief complaint of left lower quadrant pain.     His main complaint is left lower quadrant discomfort.  He states over the last 2 years approximately, he has had recurrent episodes of attacks of left lower quadrant discomfort.  They will last 3 to 4 days.  They are associated with abdominal bloating.  He will get acutely constipated and he has fever.  He states he is checked his temperature and has been up to 103 degrees.  He states in the past he just wrote it out.  He never sought medical care.  One time he did go to the ER at an outside facility.  He states he had a CT scan at that time was diagnosed with diverticulitis.  He was given antibiotics and felt better.     He had another attack August 31, a week after his appointment here.  Once again had acute left lower quadrant discomfort associated with fever.  He had a little bit of constipation.  He went to our urgent care.  No studies were ordered.  He states he was prescribed 2 antibiotics.  After taking antibiotics he was back to his normal self.     When he was here for his first time visit in August, we did not have records.  See copy of HPI below.  Rose did see his outside records and noted he had a colonoscopy in March and endoscopy October and September 2018.  See below.  At his office visit in August Rose recommended that she obtain the records which she did.  She also recommended he start a fiber supplement and take MiraLAX for his constipation.  She initiated reflux precautions.  That talked about PPI therapy but shows reflux precautions as mainstay of therapy.     His colonoscopy did show diffuse diverticulosis.  He tells me when he first saw the GI folks in [place], that he had acute left lower quadrant discomfort with fever.  They did  not recommend colonoscopy at that time.  They waited 6 weeks and then he had a colonoscopy which showed a diverticulosis but no diverticulitis.  He states he was asymptomatic at this time.  He has had recurrent attacks of left lower quadrant pain maybe once a month.  He may skip a month.  He has had them twice a month.  He states in the beginning never sought medical care.  He has had 2 family members that had to have colon surgery for recurrent diverticulitis.  He is interested in pursuing colon surgery.     He states he has been prescribed Bentyl by the outside GI group but that did not help his symptoms.  He typically does not have constipation.  He usually gets constipation when he has the attacks associated with fever.     Currently he feels well.  States he does passing bright red blood and he has known hemorrhoids.  These were also noted during his colonoscopy this past spring.     =============== August 25, 2028= PI================================     HISTORY OF PRESENT ILLNESS:     Epifanio Crandall is a 50 y.o. male presents with rectal bleeding.  He reports that once a month he will have an episode where he gets a little weak and dizzy.  This is followed by some left lower quadrant abdominal cramping then passing stool with bright red blood from the rectum.  He states that the blood is in the commode and he is unsure of the amount.  This has been going on intermittently for the last 2-1/2 years.  The left lower quadrant cramping resolves after bowel movement.  Last episode was 2 weeks ago.  He does have some irregular bowel habits manifested by constipation.  His symptoms are not associated with eating.  He does have history of diverticulitis in the past however this is not the same pain.  No syncopal episodes.  Denies unintentional weight loss.  No diarrhea.  No fevers or chills.  No history of vascular disease.  No problems with low blood pressure or heart rate.  He does not  smoke.        GERD  Occasional regurgitation at night that occurs about 4 nights per week. Uses tums prn.  He is unsure about taking a PPI in the past.  He admits to history of Franz's esophagus.  He had an upper endoscopy last year by Dr. Vallejo.  He denies dysphagia or odynophagia.  No nausea or vomiting.     He has been seen by .   Had colonoscopy was around 4 months ago.  He admits to having hemorrhoids.  He also reports history of diverticulitis in the past.  States that his last episode was 4 months ago and was treated with antibiotics.  Had egd 2019.      Received records: 3-4-20 colonoscopy by Dr. Vallejo at [facility], noted extensive diverticular disease throughout the colon, normal terminal hemorrhoids, random colonic biopsies performed to rule out microscopic colitis.  Pathology report and random colon biopsies benign.      Last egd 10/2018 noted healing gastric ulcer  ( per office note 2-13-20)   Also noted to have egd 9/2018 noting a gastric ulcer, nodular GE junction, short sliding hiatal hernia, esophagus body torturous, gastric biopsy report benign, GE junction path consistent with Franz's esophagus without dysplasia. He was scheduled for repeat egd 2/2020 but I do not have report. Will request once again last egd with path report.        Past Medical History:   Diagnosis Date   • Abnormal serum enzyme level    • Asthma    • Diverticulitis    • Elevated C-reactive protein (CRP)    • Hematuria    • Hyperlipidemia    • Internal hemorrhage    • Internal hemorrhoids    • Kidney cysts    • Peptic ulcer    • PONV (postoperative nausea and vomiting)        Past Surgical History:   Procedure Laterality Date   • COLON RESECTION N/A 10/30/2020    Procedure: LAPAROSCOPIC ASSISTED SIGMOID COLECTOMY AND LAPAROSCOPIC TAKEDOWN OF SPLEENIC FLEXURE;  Surgeon: Gretchen Martinez MD;  Location: [facility];  Service: General;  Laterality: N/A;   • HEMORRHOIDECTOMY     • KNEE ARTHROSCOPY     • KNEE SURGERY Right      laceration repair   • NASAL SEPTUM SURGERY     • RHINOPLASTY     • ROTATOR CUFF REPAIR           Current Outpatient Medications:   •  albuterol sulfate  (90 Base) MCG/ACT inhaler, Inhale 2 puffs Every 4 (Four) Hours As Needed., Disp: , Rfl:   •  oxyCODONE-acetaminophen (PERCOCET)  MG per tablet, Take 1 tablet by mouth Every 4 (Four) Hours As Needed for Moderate Pain  for up to 30 doses., Disp: 30 tablet, Rfl: 0    Allergies   Allergen Reactions   • Hydrocodone Itching   • Hydrocodone-Acetaminophen Itching     Reaction: Itching         Social History     Socioeconomic History   • Marital status:    Tobacco Use   • Smoking status: Never Smoker   • Smokeless tobacco: Never Used   Substance and Sexual Activity   • Alcohol use: Yes     Comment: rare   • Drug use: No   • Sexual activity: Defer       Family History   Problem Relation Age of Onset   • Diverticulitis Maternal Aunt    • Diverticulitis Maternal Aunt    • Diverticulitis Maternal Great-Grandmother    • Colon cancer Neg Hx    • Colon polyps Neg Hx        Review of Systems  No regular heartburn as above, no dysphagia, no abdominal pain, no change in bowel habits,    Objective     Vitals:    02/07/22 1413   BP: 142/88   Pulse: 95   Temp: 97.3 °F (36.3 °C)   SpO2: 98%       Physical Exam  My brief physical exam        Assessment/Plan   Problem List Items Addressed This Visit        Gastrointestinal Abdominal     BRBPR (bright red blood per rectum) - Primary    Relevant Orders    Case Request (Completed)    History of Franz's esophagus    Relevant Orders    Case Request (Completed)            First, regarding the bright red blood per rectum it does sounds like it is from hemorrhoids.  Is been over 2 years approximately since he last had a colonoscopy in outside facility.  With amount of blood A-Spaz I do suggest that we schedule colonoscopy investigation to confirm nothing else is going on but the hemorrhoids.  We talked about differential  diagnosis expressed understanding wishes to proceed with colonoscopy we will plan for MiraLAX prep.  In the interim for treatment of the hemorrhoid disease I recommend a high-fiber diet I suggest 10 g extra fiber daily.  I advised avoiding sitting on the commode and reading which he already avoids.  I suggest that he try some over-the-counter topical agents such as Preparation H suppositories and use 1 every evening for 5 nights in a row as directed as needed for hemorrhoid flares.  We did talk about the options of surgery.  If he fails medical therapy that is a option but we did discuss some downsides of surgical intervention such as increased risk of fecal incontinence later on in life.    Regarding history of Franz's disease his last endoscopy was approximately 2018 he does not really have significant reflux and is not take any medication.  I do recommend that he go ahead and initiate and practice reflux precautions, as well as he should lose weight.  I suggest he start over-the-counter famotidine every evening.  I recommend upper endoscopy to evaluate Franz's disease.  If there is significant Franz's, inflammation etc. then we may need additional therapy or an intervention.  He expressed an understanding wishes to proceed with endoscopy as well as colonoscopy.    Continue ongoing management by primary care provider and other specialists.     Body mass index is 39.33 kg/m².  Elevated BMI, we did discuss weight loss    The risk benefits and alternatives of endoscopy and colonoscopy were provided  EMR Dragon/transcription disclaimer:  Much of this encounter note is electronic transcription/translation of spoken language to printed text.  The electronic translation of spoken language may be erroneous, or at times, nonsensical words or phrases may be inadvertently transcribed.  Although I have reviewed the note for such errors, some may still exist.    Epifanio Sandhu MD  16:20 CST  02/07/22

## 2022-02-22 ENCOUNTER — TELEPHONE (OUTPATIENT)
Dept: GASTROENTEROLOGY | Facility: CLINIC | Age: 52
End: 2022-02-22

## 2022-12-05 NOTE — PROGRESS NOTES
Subjective    Mr. Crandall is 52 y.o. male    Chief Complaint: Low testosterone    History of Present Illness  52-year-old male new patient referred for what he states is low testosterone.  He denies any erectile dysfunction.  He is concerned about difficulty raising his testosterone levels despite injections.  He denies bothersome LUTS, hematuria, or dysuria.    The following portions of the patient's history were reviewed and updated as appropriate: allergies, current medications, past family history, past medical history, past social history, past surgical history and problem list.    Review of Systems      Current Outpatient Medications:   •  albuterol sulfate  (90 Base) MCG/ACT inhaler, Inhale 2 puffs Every 4 (Four) Hours As Needed., Disp: , Rfl:   •  famotidine (Pepcid) 20 MG tablet, Take 2 tablets by mouth Every Evening., Disp: 30 tablet, Rfl: 11  •  Fluticasone-Salmeterol (ADVAIR/WIXELA) 100-50 MCG/ACT DISKUS, INHALE 1 DOSE BY MOUTH TWICE DAILY EVERY 12 HOURS, Disp: , Rfl:   •  Testosterone Cypionate (DEPOTESTOTERONE CYPIONATE) 200 MG/ML injection, INJECT 1 ML I.M. EVERY TWO WEEKS (TWICE MONTHLY), Disp: , Rfl:     Past Medical History:   Diagnosis Date   • Abnormal serum enzyme level    • Asthma    • Diverticulitis    • Elevated C-reactive protein (CRP)    • Hematuria    • Hyperlipidemia    • Internal hemorrhage    • Internal hemorrhoids    • Kidney cysts    • Peptic ulcer    • PONV (postoperative nausea and vomiting)        Past Surgical History:   Procedure Laterality Date   • COLON RESECTION N/A 10/30/2020    Procedure: LAPAROSCOPIC ASSISTED SIGMOID COLECTOMY AND LAPAROSCOPIC TAKEDOWN OF SPLEENIC FLEXURE;  Surgeon: Gretchen Martinez MD;  Location: [facility];  Service: General;  Laterality: N/A;   • HEMORRHOIDECTOMY     • KNEE ARTHROSCOPY     • KNEE SURGERY Right     laceration repair   • NASAL SEPTUM SURGERY     • RHINOPLASTY     • ROTATOR CUFF REPAIR         Social History     Socioeconomic History  "  • Marital status:    Tobacco Use   • Smoking status: Never   • Smokeless tobacco: Never   Substance and Sexual Activity   • Alcohol use: Yes     Comment: rare   • Drug use: No   • Sexual activity: Defer       Family History   Problem Relation Age of Onset   • Diverticulitis Maternal Aunt    • Diverticulitis Maternal Aunt    • Diverticulitis Maternal Great-Grandmother    • Colon cancer Neg Hx    • Colon polyps Neg Hx        Objective    Temp 97.8 °F (36.6 °C)   Ht 160 cm (63\")   Wt 93.9 kg (207 lb)   BMI 36.67 kg/m²     Physical Exam        Results for orders placed or performed in visit on 12/08/22   POC Urinalysis Dipstick, Multipro    Specimen: Urine   Result Value Ref Range    Color Yellow Yellow, Straw, Dark Yellow, Maureen    Clarity, UA Clear Clear    Glucose, UA Negative Negative mg/dL    Bilirubin Negative Negative    Ketones, UA Negative Negative    Specific Gravity  1.030 1.005 - 1.030    Blood, UA Negative Negative    pH, Urine 7.0 5.0 - 8.0    Protein, POC Negative Negative mg/dL    Urobilinogen, UA Normal Normal, 0.2 E.U./dL    Nitrite, UA Negative Negative    Leukocytes Negative Negative     Assessment and Plan    Diagnoses and all orders for this visit:    1. Hypogonadism in male (Primary)  -     POC Urinalysis Dipstick, Multipro  -     Ambulatory Referral to ENT (Otolaryngology)      We discussed that I do not perform testosterone testing for treatment.  We discussed that hypogonadism is generally a primary care issue and/or treated by endocrinology.  We discussed that I specialize in erectile dysfunction not testosterone.  I recommended he consider seeing Dr. Roberts in [place] regarding his hypogonadism.      This document has been signed by BARB Jones MD on December 8, 2022 17:10 CST                  "

## 2022-12-08 ENCOUNTER — OFFICE VISIT (OUTPATIENT)
Dept: UROLOGY | Facility: CLINIC | Age: 52
End: 2022-12-08

## 2022-12-08 VITALS — TEMPERATURE: 97.8 F | WEIGHT: 207 LBS | HEIGHT: 63 IN | BODY MASS INDEX: 36.68 KG/M2

## 2022-12-08 DIAGNOSIS — E29.1 HYPOGONADISM IN MALE: Primary | ICD-10-CM

## 2022-12-08 LAB
BILIRUB BLD-MCNC: NEGATIVE MG/DL
CLARITY, POC: CLEAR
COLOR UR: YELLOW
GLUCOSE UR STRIP-MCNC: NEGATIVE MG/DL
KETONES UR QL: NEGATIVE
LEUKOCYTE EST, POC: NEGATIVE
NITRITE UR-MCNC: NEGATIVE MG/ML
PH UR: 7 [PH] (ref 5–8)
PROT UR STRIP-MCNC: NEGATIVE MG/DL
RBC # UR STRIP: NEGATIVE /UL
SP GR UR: 1.03 (ref 1–1.03)
UROBILINOGEN UR QL: NORMAL

## 2022-12-08 PROCEDURE — 81003 URINALYSIS AUTO W/O SCOPE: CPT | Performed by: UROLOGY

## 2022-12-08 PROCEDURE — 99202 OFFICE O/P NEW SF 15 MIN: CPT | Performed by: UROLOGY

## 2022-12-08 RX ORDER — TESTOSTERONE CYPIONATE 200 MG/ML
INJECTION, SOLUTION INTRAMUSCULAR
COMMUNITY
Start: 2022-11-30

## 2022-12-08 RX ORDER — FLUTICASONE PROPIONATE AND SALMETEROL 100; 50 UG/1; UG/1
POWDER RESPIRATORY (INHALATION)
COMMUNITY
Start: 2022-11-29

## 2022-12-09 NOTE — PROGRESS NOTES
CATIA Knox  [facility]   Pulmonary and Critical Care  [address] [place] [address]  Phone: [phone]  Fax: [phone]           Chief Complaint  Asthma    Subjective    History of Present Illness     Epifanio Crandall presents to [facility] PULMONARY & CRITICAL CARE MEDICINE   History of Present Illness  Mr. Crandall is a pleasant 52 year old male patient referred by his PCP, Ivone BARDALES for Asthma. He has known hyperlipidemia, diverticulitis, hematuria, internal hemorrage, peptic ulcer and possible Barrettes esophagus. He was diagnosed with asthma about 15-20 years ago. He denies seasonal allergies. No family history of lung disease. He was employed as a professional jockey. He states that for the most part the race tracks were prepped and not very bebe. He now has a vegetable and chicken farm. He was diagnosed with Covid-19 and was better in 3 days. He then was diagnosed with RSV and this was much worse. He was so short of breath he could barley speak one word at a time. He was admitted at [facility] and he was on supplemental oxygen for a month. He has been off of the oxygen for the last 3 weeks. He states his son was bringing viruses home from school and is now home schooled. He has been on inhalers for a long time. He has abluterol HFA that he was using twice a day before when he was sick and this has decreased now that he is much improved. He has been on Advair for the last 2-3 months and this has been helping. He had a pulmonary function study done at [facility] however he thinks this was back on 2013?. He was given steroids and antibiotics when he was discharged home from the hospital. Of note a CBC with Diff from Nov 2020 showed elevated absolute eosinophils of 920. He denies night time awakenings. He has not had any UC or ER visits prior to being sick with Covid and RSV.       Objective   Vital Signs:   /92   Pulse 76   " Ht 160 cm (63\")   Wt 95.3 kg (210 lb)   SpO2 96% Comment: RA  BMI 37.20 kg/m²     Physical Exam  Vitals reviewed.   Constitutional:       Appearance: Normal appearance. He is obese.   Cardiovascular:      Rate and Rhythm: Normal rate and regular rhythm.   Pulmonary:      Effort: Pulmonary effort is normal.      Breath sounds: Normal breath sounds.   Neurological:      General: No focal deficit present.      Mental Status: He is alert and oriented to person, place, and time.   Psychiatric:         Mood and Affect: Mood normal.         Behavior: Behavior normal.          Result Review :  The following data was reviewed by: CATIA Knox on 12/12/2022:      Data reviewed: Radiologic studies CXR    My interpretation of imaging:  No acute process   My interpretation of labs: none       My interpretation of the PFT: none     No results found for this or any previous visit.          Assessment and Plan   Diagnoses and all orders for this visit:    1. Moderate persistent asthma without complication (Primary)  -     Pulmonary Function Test; Future  -     IgE + Allergens (22)  -     CBC & Differential    2. History of COVID-19    3. History of RSV infection        He is much improved from his two most recent viral infections. He will continue Advair as he is finding benefit. He will continue albuterol HFA as needed. He is agreeable to have a complete PFT done at the hospital. I will review his CXR imaging he has brought in today on disc. He is also agreeable to have CBC with diff and IgE +22 allergens drawn today.     Follow Up   Return in about 2 months (around 2/12/2023) for PFT-complete at hospital .  Patient was given instructions and counseling regarding his condition or for health maintenance advice. Please see specific information pulled into the AVS if appropriate.     CATIA Knox  12/12/2022  20:11 CST  "

## 2022-12-12 ENCOUNTER — OFFICE VISIT (OUTPATIENT)
Dept: PULMONOLOGY | Facility: CLINIC | Age: 52
End: 2022-12-12

## 2022-12-12 VITALS
DIASTOLIC BLOOD PRESSURE: 92 MMHG | OXYGEN SATURATION: 96 % | WEIGHT: 210 LBS | HEIGHT: 63 IN | BODY MASS INDEX: 37.21 KG/M2 | SYSTOLIC BLOOD PRESSURE: 144 MMHG | HEART RATE: 76 BPM

## 2022-12-12 DIAGNOSIS — J45.40 MODERATE PERSISTENT ASTHMA WITHOUT COMPLICATION: Primary | ICD-10-CM

## 2022-12-12 DIAGNOSIS — Z86.19 HISTORY OF RSV INFECTION: ICD-10-CM

## 2022-12-12 DIAGNOSIS — Z86.16 HISTORY OF COVID-19: ICD-10-CM

## 2022-12-12 PROCEDURE — 99214 OFFICE O/P EST MOD 30 MIN: CPT | Performed by: NURSE PRACTITIONER

## 2022-12-15 LAB
A ALTERNATA IGE QN: <0.1 KU/L
A FUMIGATUS IGE QN: <0.1 KU/L
BASOPHILS # BLD AUTO: 0.1 X10E3/UL (ref 0–0.2)
BASOPHILS NFR BLD AUTO: 1 %
BERMUDA GRASS IGE QN: 0.13 KU/L
BOXELDER IGE QN: 0.12 KU/L
C HERBARUM IGE QN: <0.1 KU/L
CAT DANDER IGE QN: 0.15 KU/L
COMMON RAGWEED IGE QN: 1.1 KU/L
CONV CLASS DESCRIPTION: ABNORMAL
COTTONWOOD IGE QN: 0.1 KU/L
D FARINAE IGE QN: 33.5 KU/L
D PTERONYSS IGE QN: 28 KU/L
DOG DANDER IGE QN: 0.37 KU/L
EOSINOPHIL # BLD AUTO: 0.5 X10E3/UL (ref 0–0.4)
EOSINOPHIL NFR BLD AUTO: 6 %
ERYTHROCYTE [DISTWIDTH] IN BLOOD BY AUTOMATED COUNT: 13.2 % (ref 11.6–15.4)
HCT VFR BLD AUTO: 50.8 % (ref 37.5–51)
HGB BLD-MCNC: 16.6 G/DL (ref 13–17.7)
IGE SERPL-ACNC: 770 IU/ML (ref 6–495)
IMM GRANULOCYTES # BLD AUTO: 0 X10E3/UL (ref 0–0.1)
IMM GRANULOCYTES NFR BLD AUTO: 0 %
LYMPHOCYTES # BLD AUTO: 2.6 X10E3/UL (ref 0.7–3.1)
LYMPHOCYTES NFR BLD AUTO: 29 %
MCH RBC QN AUTO: 28.5 PG (ref 26.6–33)
MCHC RBC AUTO-ENTMCNC: 32.7 G/DL (ref 31.5–35.7)
MCV RBC AUTO: 87 FL (ref 79–97)
MONOCYTES # BLD AUTO: 0.7 X10E3/UL (ref 0.1–0.9)
MONOCYTES NFR BLD AUTO: 8 %
MT JUNIPER IGE QN: 0.49 KU/L
NETTLE IGE QN: 0.16 KU/L
NEUTROPHILS # BLD AUTO: 4.9 X10E3/UL (ref 1.4–7)
NEUTROPHILS NFR BLD AUTO: 56 %
P NOTATUM IGE QN: <0.1 KU/L
PLATELET # BLD AUTO: 185 X10E3/UL (ref 150–450)
RBC # BLD AUTO: 5.82 X10E6/UL (ref 4.14–5.8)
ROACH IGE QN: 0.27 KU/L
SALTWORT IGE QN: 0.11 KU/L
SHEEP SORREL IGE QN: <0.1 KU/L
TIMOTHY IGE QN: <0.1 KU/L
WBC # BLD AUTO: 8.8 X10E3/UL (ref 3.4–10.8)
WHITE ASH IGE QN: <0.1 KU/L
WHITE ELM IGE QN: 0.12 KU/L
WHITE MULBERRY IGE QN: <0.1 KU/L
WHITE OAK IGE QN: 0.11 KU/L

## 2022-12-19 ENCOUNTER — TELEPHONE (OUTPATIENT)
Dept: PULMONOLOGY | Facility: CLINIC | Age: 52
End: 2022-12-19

## 2022-12-22 ENCOUNTER — PATIENT ROUNDING (BHMG ONLY) (OUTPATIENT)
Dept: PULMONOLOGY | Facility: CLINIC | Age: 52
End: 2022-12-22

## 2022-12-22 NOTE — PROGRESS NOTES
December 22, 2022    Hello, may I speak with Epifanio Crandall?    My name is Bridgett León     I am  with [facility] PULMONARY & CRITICAL CARE MEDICINE  [address]  [facility] [address] [phone].    Before we get started may I verify your date of birth? 1970    I am calling to officially welcome you to our practice and ask about your recent visit. Is this a good time to talk? LVM    Tell me about your visit with us. What things went well?         We're always looking for ways to make our patients' experiences even better. Do you have recommendations on ways we may improve?      Overall were you satisfied with your first visit to our practice?        I appreciate you taking the time to speak with me today. Is there anything else I can do for you?       Thank you, and have a great day.

## 2023-03-09 ENCOUNTER — OFFICE VISIT (OUTPATIENT)
Dept: FAMILY MEDICINE CLINIC | Age: 53
End: 2023-03-09

## 2023-03-09 VITALS
TEMPERATURE: 98.8 F | DIASTOLIC BLOOD PRESSURE: 88 MMHG | WEIGHT: 216 LBS | HEART RATE: 74 BPM | HEIGHT: 63 IN | SYSTOLIC BLOOD PRESSURE: 132 MMHG | BODY MASS INDEX: 38.27 KG/M2 | OXYGEN SATURATION: 98 %

## 2023-03-09 DIAGNOSIS — Z90.49 STATUS POST LEFT HEMICOLECTOMY: ICD-10-CM

## 2023-03-09 DIAGNOSIS — K57.20 DIVERTICULITIS OF LARGE INTESTINE WITH PERFORATION AND ABSCESS, UNSPECIFIED BLEEDING STATUS: ICD-10-CM

## 2023-03-09 DIAGNOSIS — R79.89 LOW TESTOSTERONE IN MALE: ICD-10-CM

## 2023-03-09 DIAGNOSIS — K21.9 GASTROESOPHAGEAL REFLUX DISEASE, UNSPECIFIED WHETHER ESOPHAGITIS PRESENT: ICD-10-CM

## 2023-03-09 DIAGNOSIS — R73.9 HYPERGLYCEMIA: ICD-10-CM

## 2023-03-09 DIAGNOSIS — M62.838 MUSCLE SPASM: ICD-10-CM

## 2023-03-09 DIAGNOSIS — J45.40 MODERATE PERSISTENT ASTHMA WITHOUT COMPLICATION: ICD-10-CM

## 2023-03-09 DIAGNOSIS — Z00.00 VISIT FOR PREVENTIVE HEALTH EXAMINATION: Primary | ICD-10-CM

## 2023-03-09 RX ORDER — CYCLOBENZAPRINE HCL 10 MG
10 TABLET ORAL 3 TIMES DAILY PRN
Qty: 90 TABLET | Refills: 3 | Status: SHIPPED | OUTPATIENT
Start: 2023-03-09

## 2023-03-09 RX ORDER — FAMOTIDINE 40 MG/1
40 TABLET, FILM COATED ORAL EVERY EVENING
Qty: 90 TABLET | Refills: 3 | Status: SHIPPED | OUTPATIENT
Start: 2023-03-09

## 2023-03-09 RX ORDER — FAMOTIDINE 20 MG/1
40 TABLET, FILM COATED ORAL EVERY EVENING
Qty: 60 TABLET | Refills: 11 | Status: CANCELLED | OUTPATIENT
Start: 2023-03-09

## 2023-03-09 RX ORDER — FLUTICASONE PROPIONATE AND SALMETEROL 100; 50 UG/1; UG/1
1 POWDER RESPIRATORY (INHALATION) EVERY 12 HOURS
COMMUNITY
End: 2023-03-09

## 2023-03-09 RX ORDER — CYCLOBENZAPRINE HCL 10 MG
10 TABLET ORAL 3 TIMES DAILY PRN
COMMUNITY
End: 2023-03-09 | Stop reason: SDUPTHER

## 2023-03-09 RX ORDER — TESTOSTERONE CYPIONATE 200 MG/ML
150 VIAL (ML) INTRAMUSCULAR WEEKLY
Qty: 12 ML | Refills: 1 | Status: SHIPPED | OUTPATIENT
Start: 2023-03-09

## 2023-03-09 RX ORDER — ALBUTEROL SULFATE 90 UG/1
2 AEROSOL, METERED RESPIRATORY (INHALATION) 4 TIMES DAILY PRN
Qty: 6 EACH | Refills: 3 | Status: SHIPPED | OUTPATIENT
Start: 2023-03-09

## 2023-03-09 RX ORDER — FAMOTIDINE 20 MG/1
2 TABLET, FILM COATED ORAL EVERY EVENING
COMMUNITY
Start: 2022-02-07

## 2023-03-09 RX ORDER — TESTOSTERONE CYPIONATE 200 MG/ML
1 INJECTION INTRAMUSCULAR
COMMUNITY
Start: 2022-11-30

## 2023-03-09 ASSESSMENT — PATIENT HEALTH QUESTIONNAIRE - PHQ9
SUM OF ALL RESPONSES TO PHQ QUESTIONS 1-9: 0
1. LITTLE INTEREST OR PLEASURE IN DOING THINGS: 0
SUM OF ALL RESPONSES TO PHQ QUESTIONS 1-9: 0
SUM OF ALL RESPONSES TO PHQ9 QUESTIONS 1 & 2: 0
SUM OF ALL RESPONSES TO PHQ QUESTIONS 1-9: 0
SUM OF ALL RESPONSES TO PHQ QUESTIONS 1-9: 0
2. FEELING DOWN, DEPRESSED OR HOPELESS: 0

## 2023-03-09 ASSESSMENT — ENCOUNTER SYMPTOMS
RESPIRATORY NEGATIVE: 1
EYES NEGATIVE: 1
GASTROINTESTINAL NEGATIVE: 1
ALLERGIC/IMMUNOLOGIC NEGATIVE: 1

## 2023-03-09 NOTE — PROGRESS NOTES
[facility], [address]  Phone [phone]   Fax [phone]      OFFICE VISIT: 3/9/2023    Dagoberto Coppola-: 1970      HPI  Reason For Visit:  Dagoberto Obrien is a 45 y.o. Establish Care (Establish care, would like to discuss testosterone levels. ) and Health Maintenance (Colon at [address])    Patient presents to establish care. Present concerns:  He has a history of recurrent diverticulitis and has had part of his colon removed secondary to this. He is not presently on any medications for this. We did discuss dietary fiber as an option. He is following with gastroenterology at [facility]. Low testosterone:  He is on Depo testosterone cypionate 200 mg/mL, 1 mL every 2 weeks. Even on this dose, he was still around 200 on testing. We are going to try 0.75ml weekly  He is due for a testosterone check. He has been off testosterone for a while now. Asthma:  Medication:   Advair 100-51 inhalation twice daily   Albuterol HFA 2 puffs every 4 hours as needed  Symptoms: this is well controlled without advair. GERD:  Medication:              Famotidine 40 mg nightly (20mg x2)  Symptoms:controlled on this regimen       height is 5' 3\" (1.6 m) and weight is 216 lb (98 kg). His temporal temperature is 98.8 °F (37.1 °C). His blood pressure is 132/88 and his pulse is 74. His oxygen saturation is 98%. Body mass index is 38.26 kg/m². I have reviewed the following with the Mr. [facility]   Lab Review  No visits with results within 6 Month(s) from this visit. Latest known visit with results is:   No results found for any previous visit. Copies of these are in the chart. Current Outpatient Medications   Medication Sig Dispense Refill    testosterone cypionate (DEPOTESTOTERONE CYPIONATE) 200 MG/ML injection Inject 1 mL into the muscle every 14 days.       famotidine (PEPCID) 20 MG tablet Take 2 tablets by mouth every evening famotidine (PEPCID) 40 MG tablet Take 1 tablet by mouth every evening 90 tablet 3    Testosterone Cypionate 200 MG/ML SOLN Inject 150 mg as directed once a week Max Daily Amount: 150 mg 12 mL 1    albuterol sulfate HFA (VENTOLIN HFA) 108 (90 Base) MCG/ACT inhaler Inhale 2 puffs into the lungs 4 times daily as needed for Wheezing 6 each 3    cyclobenzaprine (FLEXERIL) 10 MG tablet Take 1 tablet by mouth 3 times daily as needed for Muscle spasms 90 tablet 3    albuterol sulfate HFA (PROVENTIL HFA) 108 (90 BASE) MCG/ACT inhaler Inhale 2 puffs into the lungs every 4 hours as needed for Wheezing 1 Inhaler 11     No current facility-administered medications for this visit. Allergies: Lortab [hydrocodone-acetaminophen]     Past Medical History:   Diagnosis Date    Diverticulitis        No family history on file. Past Surgical History:   Procedure Laterality Date    COLONOSCOPY      NASAL SEPTUM SURGERY      SMALL INTESTINE SURGERY      Diverticulitis- removal       Social History     Tobacco Use    Smoking status: Never    Smokeless tobacco: Never   Substance Use Topics    Alcohol use: Yes     Comment: Occasionally        Review of Systems   Constitutional: Negative. HENT: Negative. Eyes: Negative. Respiratory: Negative. Cardiovascular: Negative. Gastrointestinal: Negative. Endocrine: Negative. Genitourinary: Negative. Musculoskeletal: Negative. Skin: Negative. Allergic/Immunologic: Negative. Neurological: Negative. Hematological: Negative. Psychiatric/Behavioral: Negative. Physical Exam  Vitals and nursing note reviewed. Constitutional:       General: He is not in acute distress. Appearance: Normal appearance. He is well-developed. Comments: ob   HENT:      Head: Normocephalic and atraumatic.       Right Ear: Tympanic membrane, ear canal and external ear normal.      Left Ear: Tympanic membrane, ear canal and external ear normal.      Nose: Nose normal.      Mouth/Throat:      Mouth: Mucous membranes are moist.      Pharynx: Oropharynx is clear. No oropharyngeal exudate. Eyes:      General:         Right eye: No discharge. Left eye: No discharge. Extraocular Movements: Extraocular movements intact. Conjunctiva/sclera: Conjunctivae normal.      Pupils: Pupils are equal, round, and reactive to light. Neck:      Thyroid: No thyromegaly. Vascular: No JVD. Cardiovascular:      Rate and Rhythm: Normal rate and regular rhythm. Pulses: Normal pulses. Heart sounds: Normal heart sounds. No murmur heard. No friction rub. No gallop. Pulmonary:      Effort: Pulmonary effort is normal. No respiratory distress. Breath sounds: Normal breath sounds. No wheezing or rales. Abdominal:      General: Bowel sounds are normal. There is no distension. Palpations: Abdomen is soft. There is no mass. Tenderness: There is no abdominal tenderness. Musculoskeletal:         General: No swelling, tenderness or deformity. Normal range of motion. Cervical back: Normal range of motion and neck supple. Lymphadenopathy:      Cervical: No cervical adenopathy. Skin:     General: Skin is warm and dry. Capillary Refill: Capillary refill takes less than 2 seconds. Findings: No lesion or rash. Neurological:      General: No focal deficit present. Mental Status: He is alert and oriented to person, place, and time. Motor: No abnormal muscle tone. ASSESSMENT      ICD-10-CM    1. Visit for preventive health examination  Z00.00 CBC with Auto Differential     Comprehensive Metabolic Panel     Lipid Panel     Microalbumin / Creatinine Urine Ratio     TSH     T4, Free     Testosterone Free and Total Male      2. Diverticulitis of large intestine with perforation and abscess, unspecified bleeding status  K57.20 CBC with Auto Differential      3. Status post left hemicolectomy  Z90.49       4.  Moderate persistent asthma without complication  Z51.32 CBC with Auto Differential     Comprehensive Metabolic Panel     albuterol sulfate HFA (VENTOLIN HFA) 108 (90 Base) MCG/ACT inhaler      5. Low testosterone in male  R79.89 Testosterone Free and Total Male     Testosterone Cypionate 200 MG/ML SOLN      6. Gastroesophageal reflux disease, unspecified whether esophagitis present  K21.9 CBC with Auto Differential     Comprehensive Metabolic Panel     famotidine (PEPCID) 40 MG tablet      7. Muscle spasm  M62.838 cyclobenzaprine (FLEXERIL) 10 MG tablet      8. Hyperglycemia  R73.9 Comprehensive Metabolic Panel     Microalbumin / Creatinine Urine Ratio     Hemoglobin A1C            PLAN      1. Diverticulitis of large intestine with perforation and abscess, unspecified bleeding status  Recommend continue with high-fiber diet. May consider fiber supplementation  - CBC with Auto Differential; Future    2. Status post left hemicolectomy  Bowels have normalized    3. Moderate persistent asthma without complication  He wishes to remain on albuterol but does not want to continue with Advair  - CBC with Auto Differential; Future  - Comprehensive Metabolic Panel; Future  - albuterol sulfate HFA (VENTOLIN HFA) 108 (90 Base) MCG/ACT inhaler; Inhale 2 puffs into the lungs 4 times daily as needed for Wheezing  Dispense: 6 each; Refill: 3    4. Low testosterone in male  We are going to restart his testosterone at 150 mg weekly. We can monitor testosterone in 2 months time. This will be a mid cycle blood draw at 8:00 in the morning  - Testosterone Free and Total Male; Future  - Testosterone Cypionate 200 MG/ML SOLN; Inject 150 mg as directed once a week Max Daily Amount: 150 mg  Dispense: 12 mL; Refill: 1    5. Gastroesophageal reflux disease, unspecified whether esophagitis present  We will refill his famotidine but as a 40 mg tablet  - CBC with Auto Differential; Future  - Comprehensive Metabolic Panel;  Future  - famotidine (PEPCID) 40 MG tablet; Take 1 tablet by mouth every evening  Dispense: 90 tablet; Refill: 3    6. Visit for preventive health examination  Routine age-appropriate anticipatory guidance was provided. Annual wellness visit was performed and issues were addressed as identified.     - CBC with Auto Differential; Future  - Comprehensive Metabolic Panel; Future  - Lipid Panel; Future  - Microalbumin / Creatinine Urine Ratio; Future  - TSH; Future  - T4, Free; Future  - Testosterone Free and Total Male; Future    7. Muscle spasm  Refill Flexeril as before  - cyclobenzaprine (FLEXERIL) 10 MG tablet; Take 1 tablet by mouth 3 times daily as needed for Muscle spasms  Dispense: 90 tablet; Refill: 3    8. Hyperglycemia  Check metabolic profile and hemoglobin A1c  - Comprehensive Metabolic Panel; Future  - Microalbumin / Creatinine Urine Ratio; Future  - Hemoglobin A1C; Future    Orders Placed This Encounter   Procedures    CBC with Auto Differential    Comprehensive Metabolic Panel    Lipid Panel    Microalbumin / Creatinine Urine Ratio    TSH    T4, Free    Testosterone Free and Total Male    Hemoglobin A1C          Return in about 6 months (around 9/9/2023). This was an in-house visit.

## 2023-06-01 ENCOUNTER — TELEPHONE (OUTPATIENT)
Dept: FAMILY MEDICINE CLINIC | Age: 53
End: 2023-06-01

## 2023-06-01 ENCOUNTER — OFFICE VISIT (OUTPATIENT)
Dept: FAMILY MEDICINE CLINIC | Age: 53
End: 2023-06-01

## 2023-06-01 VITALS
TEMPERATURE: 97.5 F | BODY MASS INDEX: 39.16 KG/M2 | SYSTOLIC BLOOD PRESSURE: 148 MMHG | OXYGEN SATURATION: 95 % | HEIGHT: 63 IN | DIASTOLIC BLOOD PRESSURE: 92 MMHG | WEIGHT: 221 LBS | HEART RATE: 75 BPM

## 2023-06-01 DIAGNOSIS — Z12.5 SCREENING FOR PROSTATE CANCER: ICD-10-CM

## 2023-06-01 DIAGNOSIS — R79.89 LOW TESTOSTERONE IN MALE: ICD-10-CM

## 2023-06-01 DIAGNOSIS — K21.9 GASTROESOPHAGEAL REFLUX DISEASE, UNSPECIFIED WHETHER ESOPHAGITIS PRESENT: ICD-10-CM

## 2023-06-01 DIAGNOSIS — I10 PRIMARY HYPERTENSION: ICD-10-CM

## 2023-06-01 DIAGNOSIS — K57.20 DIVERTICULITIS OF LARGE INTESTINE WITH PERFORATION AND ABSCESS, UNSPECIFIED BLEEDING STATUS: ICD-10-CM

## 2023-06-01 DIAGNOSIS — Z00.00 VISIT FOR PREVENTIVE HEALTH EXAMINATION: ICD-10-CM

## 2023-06-01 DIAGNOSIS — J45.40 MODERATE PERSISTENT ASTHMA WITHOUT COMPLICATION: ICD-10-CM

## 2023-06-01 DIAGNOSIS — M54.2 CERVICAL PAIN: ICD-10-CM

## 2023-06-01 DIAGNOSIS — R73.9 HYPERGLYCEMIA: ICD-10-CM

## 2023-06-01 DIAGNOSIS — D58.2 ELEVATED HEMOGLOBIN (HCC): Primary | ICD-10-CM

## 2023-06-01 DIAGNOSIS — M25.561 ACUTE PAIN OF RIGHT KNEE: ICD-10-CM

## 2023-06-01 DIAGNOSIS — M54.40 BACK PAIN OF LUMBAR REGION WITH SCIATICA: Primary | ICD-10-CM

## 2023-06-01 LAB
ALBUMIN SERPL-MCNC: 4.3 G/DL (ref 3.5–5.2)
ALP SERPL-CCNC: 70 U/L (ref 40–130)
ALT SERPL-CCNC: 32 U/L (ref 5–41)
ANION GAP SERPL CALCULATED.3IONS-SCNC: 10 MMOL/L (ref 7–19)
AST SERPL-CCNC: 33 U/L (ref 5–40)
BASOPHILS # BLD: 0.1 K/UL (ref 0–0.2)
BASOPHILS NFR BLD: 1 % (ref 0–1)
BILIRUB SERPL-MCNC: 0.9 MG/DL (ref 0.2–1.2)
BUN SERPL-MCNC: 13 MG/DL (ref 6–20)
CALCIUM SERPL-MCNC: 9.4 MG/DL (ref 8.6–10)
CHLORIDE SERPL-SCNC: 102 MMOL/L (ref 98–111)
CHOLEST SERPL-MCNC: 182 MG/DL (ref 160–199)
CO2 SERPL-SCNC: 27 MMOL/L (ref 22–29)
CREAT SERPL-MCNC: 0.8 MG/DL (ref 0.5–1.2)
CREAT UR-MCNC: 154.2 MG/DL (ref 4.2–622)
EOSINOPHIL # BLD: 0.5 K/UL (ref 0–0.6)
EOSINOPHIL NFR BLD: 6.2 % (ref 0–5)
ERYTHROCYTE [DISTWIDTH] IN BLOOD BY AUTOMATED COUNT: 13.2 % (ref 11.5–14.5)
GLUCOSE SERPL-MCNC: 109 MG/DL (ref 74–109)
HBA1C MFR BLD: 6.2 % (ref 4–6)
HCT VFR BLD AUTO: 61.1 % (ref 42–52)
HDLC SERPL-MCNC: 33 MG/DL (ref 55–121)
HGB BLD-MCNC: 19.4 G/DL (ref 14–18)
IMM GRANULOCYTES # BLD: 0 K/UL
LDLC SERPL CALC-MCNC: 122 MG/DL
LYMPHOCYTES # BLD: 2.3 K/UL (ref 1.1–4.5)
LYMPHOCYTES NFR BLD: 30.4 % (ref 20–40)
MCH RBC QN AUTO: 29.1 PG (ref 27–31)
MCHC RBC AUTO-ENTMCNC: 31.8 G/DL (ref 33–37)
MCV RBC AUTO: 91.7 FL (ref 80–94)
MICROALBUMIN UR-MCNC: 9.1 MG/DL (ref 0–19)
MICROALBUMIN/CREAT UR-RTO: 59 MG/G
MONOCYTES # BLD: 0.7 K/UL (ref 0–0.9)
MONOCYTES NFR BLD: 9.6 % (ref 0–10)
NEUTROPHILS # BLD: 4 K/UL (ref 1.5–7.5)
NEUTS SEG NFR BLD: 52.5 % (ref 50–65)
PLATELET # BLD AUTO: 170 K/UL (ref 130–400)
PMV BLD AUTO: 12.5 FL (ref 9.4–12.4)
POTASSIUM SERPL-SCNC: 4 MMOL/L (ref 3.5–5)
PROT SERPL-MCNC: 7.7 G/DL (ref 6.6–8.7)
PSA SERPL-MCNC: 0.74 NG/ML (ref 0–4)
RBC # BLD AUTO: 6.66 M/UL (ref 4.7–6.1)
SODIUM SERPL-SCNC: 139 MMOL/L (ref 136–145)
T4 FREE SERPL-MCNC: 1.05 NG/DL (ref 0.93–1.7)
TRIGL SERPL-MCNC: 137 MG/DL (ref 0–149)
TSH SERPL DL<=0.005 MIU/L-ACNC: 2.22 UIU/ML (ref 0.27–4.2)
WBC # BLD AUTO: 7.7 K/UL (ref 4.8–10.8)

## 2023-06-01 RX ORDER — LISINOPRIL 10 MG/1
10 TABLET ORAL DAILY
Qty: 30 TABLET | Refills: 5 | Status: SHIPPED | OUTPATIENT
Start: 2023-06-01

## 2023-06-01 RX ORDER — TRIAMCINOLONE ACETONIDE 40 MG/ML
40 INJECTION, SUSPENSION INTRA-ARTICULAR; INTRAMUSCULAR ONCE
Status: COMPLETED | OUTPATIENT
Start: 2023-06-01 | End: 2023-06-01

## 2023-06-01 RX ORDER — DEXAMETHASONE SODIUM PHOSPHATE 4 MG/ML
4 INJECTION, SOLUTION INTRA-ARTICULAR; INTRALESIONAL; INTRAMUSCULAR; INTRAVENOUS; SOFT TISSUE ONCE
Status: COMPLETED | OUTPATIENT
Start: 2023-06-01 | End: 2023-06-01

## 2023-06-01 RX ORDER — METHYLPREDNISOLONE 4 MG/1
TABLET ORAL
Qty: 1 KIT | Refills: 0 | Status: SHIPPED | OUTPATIENT
Start: 2023-06-01 | End: 2023-06-07

## 2023-06-01 RX ORDER — GABAPENTIN 100 MG/1
100 CAPSULE ORAL 2 TIMES DAILY
Qty: 60 CAPSULE | Refills: 3 | Status: SHIPPED | OUTPATIENT
Start: 2023-06-01 | End: 2023-07-01

## 2023-06-01 RX ADMIN — DEXAMETHASONE SODIUM PHOSPHATE 4 MG: 4 INJECTION, SOLUTION INTRA-ARTICULAR; INTRALESIONAL; INTRAMUSCULAR; INTRAVENOUS; SOFT TISSUE at 08:15

## 2023-06-01 RX ADMIN — TRIAMCINOLONE ACETONIDE 40 MG: 40 INJECTION, SUSPENSION INTRA-ARTICULAR; INTRAMUSCULAR at 08:16

## 2023-06-01 ASSESSMENT — ENCOUNTER SYMPTOMS
EYE REDNESS: 0
CONSTIPATION: 0
COUGH: 0
VOMITING: 0
SORE THROAT: 0
DIARRHEA: 0
SHORTNESS OF BREATH: 0
BACK PAIN: 1
RHINORRHEA: 0

## 2023-06-01 NOTE — PROGRESS NOTES
After obtaining consent, and per orders of GLYNN MCKAY, injection of 4mg decadron given in Right upper quad. gluteus  by Mehul Patel. Patient tolerated well. Medication was not supplied by patient. After obtaining consent, and per orders of GLYNN MCKAY, injection of 40mg kenalog given in Right upper quad. gluteus  by Mehul Patel. Patient tolerated well. Medication was not supplied by patient.

## 2023-06-01 NOTE — TELEPHONE ENCOUNTER
----- Message from WOODY Robb sent at 6/1/2023  1:56 PM CDT -----  Please call patient and let them know results.    Normal prostate

## 2023-06-01 NOTE — PROGRESS NOTES
Tony Isabel (:  1970) is a 52 y.o. male,Established patient, here for evaluation of the following chief complaint(s):  Knee Pain (Right knee)      ASSESSMENT/PLAN:    ICD-10-CM    1. Back pain of lumbar region with sciatica  M54.40 dexamethasone (DECADRON) injection 4 mg     triamcinolone acetonide (KENALOG-40) injection 40 mg     gabapentin (NEURONTIN) 100 MG capsule     methylPREDNISolone (MEDROL DOSEPACK) 4 MG tablet    Patient reports the pain is predominantly in the right knee. However, after further discussion with patient I feel like his knee pain is coming from his low back. Recommend conservative treatment as prescribed but if pain does not improve recommend lumbar x-ray      2. Screening for prostate cancer  Z12.5 PSA Screening      3. Primary hypertension  I10 lisinopril (PRINIVIL;ZESTRIL) 10 MG tablet    Patient is asked to monitor BP at home or work, several times per month and return with written values at next office visit. 4. Acute pain of right knee  M25.561 dexamethasone (DECADRON) injection 4 mg     triamcinolone acetonide (KENALOG-40) injection 40 mg     gabapentin (NEURONTIN) 100 MG capsule     methylPREDNISolone (MEDROL DOSEPACK) 4 MG tablet      5. Cervical pain  M54.2 dexamethasone (DECADRON) injection 4 mg     triamcinolone acetonide (KENALOG-40) injection 40 mg     methylPREDNISolone (MEDROL DOSEPACK) 4 MG tablet          Return in about 4 weeks (around 2023), or if symptoms worsen or fail to improve. SUBJECTIVE/OBJECTIVE:  HPI    He is a farmer. RIGHT KNEE PAIN:  Reports right knee pain. The pain is constant but at times it becomes more intense  Denies swelling or redness of the knee  Denies crepitance  Reports pain starts at his right buttock and shoots to his right knee  Denies known knee injury    CERVICAL NECK PAIN:  He has been waking with a stiff neck.   He has taking muscle relaxants and NSAID's  These medications are helping with his neck

## 2023-06-01 NOTE — TELEPHONE ENCOUNTER
----- Message from [id number] [facility],[address], DO sent at 6/1/2023  2:00 PM CDT -----    A1c is 6.2. This indicates that blood sugars are excellently controlled over the past 3 months  Lipids are elevated. This is within range and you may be able to get this down with diet and exercise. Recommended low-fat low-cholesterol diet and increase exercise. We can recheck lipids in 3 to 6 months. If this remains elevated, we can consider starting a medication to lower the cholesterol and decrease risk  Red blood cells and hemoglobin are very high. This is likely caused by the testosterone. Testosterone level is still pending. Thyroid is normal.  There is some protein in the urine. Recommend staying on lisinopril daily. We also need to maintain excellent blood pressure control in order to prevent kidney injury. We do need to see what the testosterone level is. I would recommend donating blood as your blood count is too high. I recommend against any iron supplementation. Please see if we can add a ferritin and iron to his lab survey.

## 2023-06-02 ENCOUNTER — SCHEDULED TELEPHONE ENCOUNTER (OUTPATIENT)
Dept: FAMILY MEDICINE CLINIC | Age: 53
End: 2023-06-02
Payer: COMMERCIAL

## 2023-06-02 DIAGNOSIS — D75.1 POLYCYTHEMIA: Primary | ICD-10-CM

## 2023-06-02 DIAGNOSIS — E29.1 HYPOGONADISM MALE: ICD-10-CM

## 2023-06-02 LAB
FERRITIN SERPL-MCNC: 27.6 NG/ML (ref 30–400)
IRON SERPL-MCNC: 92 UG/DL (ref 59–158)
SHBG SERPL-SCNC: 116 PG/ML (ref 47–244)
SHBG SERPL-SCNC: 26 NMOL/L (ref 11–80)
TESTOST SERPL-MCNC: 487 NG/DL (ref 220–1000)

## 2023-06-02 PROCEDURE — 99442 PR PHYS/QHP TELEPHONE EVALUATION 11-20 MIN: CPT | Performed by: PEDIATRICS

## 2023-06-02 ASSESSMENT — ENCOUNTER SYMPTOMS
GASTROINTESTINAL NEGATIVE: 1
ALLERGIC/IMMUNOLOGIC NEGATIVE: 1
EYES NEGATIVE: 1
RESPIRATORY NEGATIVE: 1

## 2023-06-02 NOTE — TELEPHONE ENCOUNTER
Called patient, spoke with: Patient regarding the results of the patients most recent labs. I advised Patient of Dr. Neil Acuña recommendations.    Patient did voice understanding    Called and spoke with Alisha Lassiter in the lab and added on labs

## 2023-06-02 NOTE — PROGRESS NOTES
Hematocrit 06/01/2023 61.1 (H)     MCV 06/01/2023 91.7     MCH 06/01/2023 29.1     MCHC 06/01/2023 31.8 (L)     RDW 06/01/2023 13.2     Platelets 17/50/0617 170     MPV 06/01/2023 12.5 (H)     Neutrophils % 06/01/2023 52.5     Lymphocytes % 06/01/2023 30.4     Monocytes % 06/01/2023 9.6     Eosinophils % 06/01/2023 6.2 (H)     Basophils % 06/01/2023 1.0     Neutrophils Absolute 06/01/2023 4.0     Immature Granulocytes # 06/01/2023 0.0     Lymphocytes Absolute 06/01/2023 2.3     Monocytes Absolute 06/01/2023 0.70     Eosinophils Absolute 06/01/2023 0.50     Basophils Absolute 06/01/2023 0.10     PSA 06/01/2023 0.74      Copies of these are in the chart. Current Outpatient Medications   Medication Sig Dispense Refill    gabapentin (NEURONTIN) 100 MG capsule Take 1 capsule by mouth in the morning and at bedtime for 30 days. Max Daily Amount: 200 mg 60 capsule 3    methylPREDNISolone (MEDROL DOSEPACK) 4 MG tablet Take by mouth. 1 kit 0    lisinopril (PRINIVIL;ZESTRIL) 10 MG tablet Take 1 tablet by mouth daily 30 tablet 5    famotidine (PEPCID) 40 MG tablet Take 1 tablet by mouth every evening 90 tablet 3    Testosterone Cypionate 200 MG/ML SOLN Inject 150 mg as directed once a week Max Daily Amount: 150 mg 12 mL 1    albuterol sulfate HFA (VENTOLIN HFA) 108 (90 Base) MCG/ACT inhaler Inhale 2 puffs into the lungs 4 times daily as needed for Wheezing 6 each 3    cyclobenzaprine (FLEXERIL) 10 MG tablet Take 1 tablet by mouth 3 times daily as needed for Muscle spasms 90 tablet 3    albuterol sulfate HFA (PROVENTIL HFA) 108 (90 BASE) MCG/ACT inhaler Inhale 2 puffs into the lungs every 4 hours as needed for Wheezing 1 Inhaler 11     No current facility-administered medications for this visit. Allergies: Lortab [hydrocodone-acetaminophen]     Past Medical History:   Diagnosis Date    Diverticulitis        No family history on file.     Past Surgical History:   Procedure Laterality Date    COLONOSCOPY      NASAL SEPTUM

## 2023-06-02 NOTE — TELEPHONE ENCOUNTER
Left message on patient's voicemail to call back for results. ---- Message from WOODY Putnam sent at 6/1/2023  1:56 PM CDT -----  Please call patient and let them know results.    Normal prostate

## 2023-06-05 ENCOUNTER — TELEPHONE (OUTPATIENT)
Dept: FAMILY MEDICINE CLINIC | Age: 53
End: 2023-06-05

## 2023-06-05 DIAGNOSIS — D75.1 POLYCYTHEMIA: Primary | ICD-10-CM

## 2023-06-05 NOTE — TELEPHONE ENCOUNTER
----- Message from [id number] [facility],[address], DO sent at 6/2/2023  5:19 PM CDT -----  Iron stores are relatively low.   Iron level is normal.  We can follow iron levels over time

## 2023-06-05 NOTE — TELEPHONE ENCOUNTER
Called patient, spoke with: Patient regarding the results of the patients most recent labs . I advised Patient of Dr. Meenu Sexton recommendations. Patient did voice understanding     Pt went to donate blood but he could not due to BP being elevated he stated his head is pounding he would like to know if our lab is able to drain the blood?

## 2023-06-07 DIAGNOSIS — D75.1 POLYCYTHEMIA: Primary | ICD-10-CM

## 2023-06-08 ENCOUNTER — TELEPHONE (OUTPATIENT)
Dept: FAMILY MEDICINE CLINIC | Age: 53
End: 2023-06-08

## 2023-06-08 DIAGNOSIS — R79.89 LOW TESTOSTERONE IN MALE: ICD-10-CM

## 2023-06-08 RX ORDER — TESTOSTERONE CYPIONATE 200 MG/ML
150 VIAL (ML) INTRAMUSCULAR WEEKLY
Qty: 9 ML | Refills: 0 | Status: SHIPPED | OUTPATIENT
Start: 2023-06-08

## 2023-06-08 NOTE — PROGRESS NOTES
[facility], [address]  Phone [phone]   Fax [phone]      OFFICE VISIT: 2023    Louis Coppola-: 1970      HPI  Reason For Visit:  Louis Ramon is a 52 y.o. No chief complaint on file. vitals were not taken for this visit. There is no height or weight on file to calculate BMI. I have reviewed the following with the Mr. Gila Rodriguez   Lab Review  Telephone on 2023   Component Date Value    Ferritin 2023 27.6 (L)     Iron 2023 92    Orders Only on 2023   Component Date Value    Hemoglobin A1C 2023 6.2 (H)     Testosterone 2023 487     Sex Hormone Binding 2023 26     Testosterone, Free 2023 116.0     T4 Free 2023 1.05     TSH 2023 2.220     Microalbumin, Random Uri* 2023 9.10     Creatinine, Ur 2023 154.2     Microalbumin Creatinine * 2023 59.0     Cholesterol, Total 2023 182     Triglycerides 2023 137     HDL 2023 33 (L)     LDL Calculated 2023 122     Sodium 2023 139     Potassium 2023 4.0     Chloride 2023 102     CO2 2023 27     Anion Gap 2023 10     Glucose 2023 109     BUN 2023 13     Creatinine 2023 0.8     Est, Glom Filt Rate 2023 >60     Calcium 2023 9.4     Total Protein 2023 7.7     Albumin 2023 4.3     Total Bilirubin 2023 0.9     Alkaline Phosphatase 2023 70     ALT 2023 32     AST 2023 33     WBC 2023 7.7     RBC 2023 6.66 (H)     Hemoglobin 2023 19.4 (H)     Hematocrit 2023 61.1 (H)     MCV 2023 91.7     MCH 2023 29.1     MCHC 2023 31.8 (L)     RDW 2023 13.2     Platelets  170     MPV 2023 12.5 (H)     Neutrophils % 2023 52.5     Lymphocytes % 2023 30.4     Monocytes % 2023 9.6     Eosinophils % 2023 6.2 (H)     Basophils % 2023 1.0

## 2023-06-08 NOTE — TELEPHONE ENCOUNTER
Alison Dominguez from [facility] called to report  critical lab on pts HCT at 58.9. This was pre-phlebotomy. On the report that was sent over by the hospital this morning it was 59.7. They did not check this post-phlebotomy. Will send to provider for recommendations.

## 2023-06-08 NOTE — TELEPHONE ENCOUNTER
Pt is wanting to know if you are going to go ahead and send in his testosterone or does he need to have his blood drawn first for this? Adkins end back to provider for recommendations.

## 2023-06-13 DIAGNOSIS — R79.89 LOW TESTOSTERONE IN MALE: ICD-10-CM

## 2023-07-11 DIAGNOSIS — R79.89 LOW TESTOSTERONE IN MALE: ICD-10-CM

## 2023-07-11 RX ORDER — TESTOSTERONE CYPIONATE 200 MG/ML
400 VIAL (ML) INTRAMUSCULAR WEEKLY
Qty: 8 ML | Refills: 0 | Status: SHIPPED | OUTPATIENT
Start: 2023-07-11

## 2023-07-11 NOTE — TELEPHONE ENCOUNTER
Spoke with patient regarding his testosterone prescription. He states hes take 2 vials weekly and has since March 2023. Verified patient had the 200mg/ml vials and that he was taking 400mg injection weekly. Spoke with Dr. Preeti Gardner, he is sending in correct script for this doseage and would like patient to have his testosterone rechecked in 4 weeks. Patient aware.     Requested Prescriptions     Pending Prescriptions Disp Refills    Testosterone Cypionate 200 MG/ML SOLN 8 mL 0     Sig: Inject 400 mg as directed once a week Max Daily Amount: 400 mg

## 2023-08-04 DIAGNOSIS — D75.1 POLYCYTHEMIA: ICD-10-CM

## 2023-08-04 DIAGNOSIS — R79.89 LOW TESTOSTERONE IN MALE: ICD-10-CM

## 2023-08-04 LAB
BASOPHILS # BLD: 0.1 K/UL (ref 0–0.2)
BASOPHILS NFR BLD: 0.9 % (ref 0–1)
EOSINOPHIL # BLD: 0.3 K/UL (ref 0–0.6)
EOSINOPHIL NFR BLD: 3.6 % (ref 0–5)
ERYTHROCYTE [DISTWIDTH] IN BLOOD BY AUTOMATED COUNT: 17.5 % (ref 11.5–14.5)
HCT VFR BLD AUTO: 60.7 % (ref 42–52)
HGB BLD-MCNC: 19 G/DL (ref 14–18)
IMM GRANULOCYTES # BLD: 0 K/UL
LYMPHOCYTES # BLD: 2.6 K/UL (ref 1.1–4.5)
LYMPHOCYTES NFR BLD: 27.9 % (ref 20–40)
MCH RBC QN AUTO: 28 PG (ref 27–31)
MCHC RBC AUTO-ENTMCNC: 31.3 G/DL (ref 33–37)
MCV RBC AUTO: 89.4 FL (ref 80–94)
MONOCYTES # BLD: 0.8 K/UL (ref 0–0.9)
MONOCYTES NFR BLD: 8.7 % (ref 0–10)
NEUTROPHILS # BLD: 5.5 K/UL (ref 1.5–7.5)
NEUTS SEG NFR BLD: 58.6 % (ref 50–65)
PLATELET # BLD AUTO: 159 K/UL (ref 130–400)
PMV BLD AUTO: 12.7 FL (ref 9.4–12.4)
RBC # BLD AUTO: 6.79 M/UL (ref 4.7–6.1)
WBC # BLD AUTO: 9.4 K/UL (ref 4.8–10.8)

## 2023-08-08 ENCOUNTER — TELEPHONE (OUTPATIENT)
Dept: FAMILY MEDICINE CLINIC | Age: 53
End: 2023-08-08

## 2023-08-08 DIAGNOSIS — R79.89 LOW TESTOSTERONE IN MALE: ICD-10-CM

## 2023-08-08 LAB
SHBG SERPL-SCNC: 23 NMOL/L (ref 11–80)
SHBG SERPL-SCNC: ABNORMAL PG/ML (ref 47–244)
TESTOST SERPL-MCNC: >1500 NG/DL (ref 220–1000)

## 2023-08-08 RX ORDER — TESTOSTERONE CYPIONATE 200 MG/ML
1.5 VIAL (ML) INTRAMUSCULAR WEEKLY
Qty: 8 ML | Refills: 0 | Status: SHIPPED | OUTPATIENT
Start: 2023-08-08

## 2023-08-08 NOTE — TELEPHONE ENCOUNTER
----- Message from [place], DO sent at 8/8/2023  4:49 PM CDT -----  Testosterone is off the scale to even be measured. Recommend decreasing to 1.5 mL weekly instead of 2 mL weekly. This is likely why your hemoglobin is as high as it is. The elevated hemoglobin increases your risk of heart attack and stroke. It also increases your risk of blood clot. Recheck testosterone in 1 month.

## 2023-08-08 NOTE — TELEPHONE ENCOUNTER
----- Message from [address], DO sent at 8/4/2023  1:24 PM CDT -----  Blood count is too high. Need to repeat phlebotomy. We can set this up at [facility] to remove 1 pint of blood   Dx: Polycythemia    Testosterone is pending.

## 2023-08-08 NOTE — TELEPHONE ENCOUNTER
Called patient, spoke with: Patient regarding the results of the patients most recent labs. I advised Patient of Dr. Diamond Foster recommendations. Patient did voice understanding    Will send rx to provider to fill.    Requested Prescriptions     Pending Prescriptions Disp Refills    Testosterone Cypionate 200 MG/ML SOLN 8 mL 0     Sig: Inject 1.5 mg as directed once a week Max Daily Amount: 1.5 mg

## 2023-09-11 ENCOUNTER — OFFICE VISIT (OUTPATIENT)
Dept: FAMILY MEDICINE CLINIC | Age: 53
End: 2023-09-11
Payer: COMMERCIAL

## 2023-09-11 VITALS
BODY MASS INDEX: 37.39 KG/M2 | HEART RATE: 77 BPM | OXYGEN SATURATION: 97 % | HEIGHT: 63 IN | WEIGHT: 211 LBS | TEMPERATURE: 98 F | SYSTOLIC BLOOD PRESSURE: 148 MMHG | DIASTOLIC BLOOD PRESSURE: 102 MMHG

## 2023-09-11 DIAGNOSIS — R79.89 LOW TESTOSTERONE IN MALE: ICD-10-CM

## 2023-09-11 DIAGNOSIS — I10 PRIMARY HYPERTENSION: ICD-10-CM

## 2023-09-11 DIAGNOSIS — M25.561 ACUTE PAIN OF RIGHT KNEE: ICD-10-CM

## 2023-09-11 DIAGNOSIS — M62.838 MUSCLE SPASM: Primary | ICD-10-CM

## 2023-09-11 DIAGNOSIS — D75.1 POLYCYTHEMIA: ICD-10-CM

## 2023-09-11 DIAGNOSIS — G47.19 DAYTIME HYPERSOMNOLENCE: ICD-10-CM

## 2023-09-11 DIAGNOSIS — M54.40 BACK PAIN OF LUMBAR REGION WITH SCIATICA: ICD-10-CM

## 2023-09-11 PROCEDURE — 99214 OFFICE O/P EST MOD 30 MIN: CPT | Performed by: PEDIATRICS

## 2023-09-11 PROCEDURE — 3077F SYST BP >= 140 MM HG: CPT | Performed by: PEDIATRICS

## 2023-09-11 PROCEDURE — 3080F DIAST BP >= 90 MM HG: CPT | Performed by: PEDIATRICS

## 2023-09-11 RX ORDER — TIZANIDINE 4 MG/1
4 TABLET ORAL 3 TIMES DAILY PRN
Qty: 30 TABLET | Refills: 0 | Status: SHIPPED | OUTPATIENT
Start: 2023-09-11

## 2023-09-11 RX ORDER — TESTOSTERONE CYPIONATE 200 MG/ML
1.5 VIAL (ML) INTRAMUSCULAR WEEKLY
Qty: 8 ML | Refills: 0 | Status: SHIPPED | OUTPATIENT
Start: 2023-09-11

## 2023-09-11 RX ORDER — LISINOPRIL 20 MG/1
20 TABLET ORAL DAILY
Qty: 90 TABLET | Refills: 3 | Status: SHIPPED | OUTPATIENT
Start: 2023-09-11

## 2023-09-11 RX ORDER — METHYLPREDNISOLONE 4 MG/1
TABLET ORAL
Qty: 1 KIT | Refills: 1 | Status: SHIPPED | OUTPATIENT
Start: 2023-09-11 | End: 2023-09-17

## 2023-09-11 ASSESSMENT — ENCOUNTER SYMPTOMS
RESPIRATORY NEGATIVE: 1
GASTROINTESTINAL NEGATIVE: 1
EYES NEGATIVE: 1
ALLERGIC/IMMUNOLOGIC NEGATIVE: 1

## 2023-09-11 NOTE — PROGRESS NOTES
this in near future. - Home Sleep Study; Future    7. Primary hypertension  Increase lisinopril to 20mg daily. - lisinopril (PRINIVIL;ZESTRIL) 20 MG tablet; Take 1 tablet by mouth daily  Dispense: 90 tablet; Refill: 3      Orders Placed This Encounter   Procedures    Testosterone, free, total    CBC with Auto Differential    Ferritin    Comprehensive Metabolic Panel    Home Sleep Study        Return in about 6 months (around 3/11/2024).      This was an in-house visit

## 2023-12-05 DIAGNOSIS — M62.838 MUSCLE SPASM: ICD-10-CM

## 2023-12-05 DIAGNOSIS — J45.20 MILD INTERMITTENT ASTHMA WITHOUT COMPLICATION: ICD-10-CM

## 2023-12-05 RX ORDER — ALBUTEROL SULFATE 90 UG/1
2 AEROSOL, METERED RESPIRATORY (INHALATION) EVERY 4 HOURS PRN
Qty: 1 EACH | Refills: 11 | Status: SHIPPED | OUTPATIENT
Start: 2023-12-05 | End: 2023-12-08 | Stop reason: SDUPTHER

## 2023-12-05 NOTE — TELEPHONE ENCOUNTER
Pt called requesting refill on his albuterol inhaler and he is also requesting the muscle relaxer that does not make you drowsy. He said it is the \"twin brother to the flexeril(per Dr Paras Naik it is the same thing it just doesn't make you drowsy.)     Will send this request to Dr Paras Naik to see which medication this would be.      Requested Prescriptions     Pending Prescriptions Disp Refills    albuterol sulfate HFA (PROVENTIL HFA) 108 (90 Base) MCG/ACT inhaler 1 each 11     Sig: Inhale 2 puffs into the lungs every 4 hours as needed for Wheezing

## 2023-12-08 ENCOUNTER — OFFICE VISIT (OUTPATIENT)
Dept: FAMILY MEDICINE CLINIC | Age: 53
End: 2023-12-08

## 2023-12-08 VITALS
HEIGHT: 63 IN | OXYGEN SATURATION: 97 % | TEMPERATURE: 97.6 F | HEART RATE: 86 BPM | SYSTOLIC BLOOD PRESSURE: 120 MMHG | DIASTOLIC BLOOD PRESSURE: 86 MMHG | BODY MASS INDEX: 37.39 KG/M2 | WEIGHT: 211 LBS

## 2023-12-08 DIAGNOSIS — J45.20 MILD INTERMITTENT ASTHMA WITHOUT COMPLICATION: ICD-10-CM

## 2023-12-08 DIAGNOSIS — M54.40 BACK PAIN OF LUMBAR REGION WITH SCIATICA: ICD-10-CM

## 2023-12-08 DIAGNOSIS — M25.561 CHRONIC PAIN OF RIGHT KNEE: Primary | ICD-10-CM

## 2023-12-08 DIAGNOSIS — G89.29 CHRONIC PAIN OF RIGHT KNEE: Primary | ICD-10-CM

## 2023-12-08 DIAGNOSIS — M62.838 MUSCLE SPASM: ICD-10-CM

## 2023-12-08 RX ORDER — TIZANIDINE 4 MG/1
4 TABLET ORAL 3 TIMES DAILY PRN
Qty: 30 TABLET | Refills: 1 | Status: SHIPPED | OUTPATIENT
Start: 2023-12-08

## 2023-12-08 RX ORDER — DEXAMETHASONE SODIUM PHOSPHATE 10 MG/ML
10 INJECTION, EMULSION INTRAMUSCULAR; INTRAVENOUS ONCE
Qty: 1 ML | Refills: 0 | Status: CANCELLED | OUTPATIENT
Start: 2023-12-08 | End: 2023-12-08

## 2023-12-08 RX ORDER — ALBUTEROL SULFATE 90 UG/1
2 AEROSOL, METERED RESPIRATORY (INHALATION) EVERY 4 HOURS PRN
Qty: 3 EACH | Refills: 3 | Status: SHIPPED | OUTPATIENT
Start: 2023-12-08

## 2023-12-08 RX ORDER — DEXAMETHASONE SODIUM PHOSPHATE 10 MG/ML
4 INJECTION INTRAMUSCULAR; INTRAVENOUS ONCE
Status: COMPLETED | OUTPATIENT
Start: 2023-12-08 | End: 2023-12-08

## 2023-12-08 RX ORDER — METHYLPREDNISOLONE 4 MG/1
TABLET ORAL
Qty: 1 KIT | Refills: 0 | Status: SHIPPED | OUTPATIENT
Start: 2023-12-08 | End: 2023-12-14

## 2023-12-08 RX ORDER — TRIAMCINOLONE ACETONIDE 40 MG/ML
40 INJECTION, SUSPENSION INTRA-ARTICULAR; INTRAMUSCULAR ONCE
Status: COMPLETED | OUTPATIENT
Start: 2023-12-08 | End: 2023-12-08

## 2023-12-08 RX ORDER — DEXAMETHASONE SODIUM PHOSPHATE 4 MG/ML
4 INJECTION, SOLUTION INTRA-ARTICULAR; INTRALESIONAL; INTRAMUSCULAR; INTRAVENOUS; SOFT TISSUE ONCE
Status: SHIPPED | OUTPATIENT
Start: 2023-12-08

## 2023-12-08 RX ADMIN — TRIAMCINOLONE ACETONIDE 40 MG: 40 INJECTION, SUSPENSION INTRA-ARTICULAR; INTRAMUSCULAR at 12:08

## 2023-12-08 RX ADMIN — DEXAMETHASONE SODIUM PHOSPHATE 4 MG: 10 INJECTION INTRAMUSCULAR; INTRAVENOUS at 12:15

## 2023-12-08 ASSESSMENT — ENCOUNTER SYMPTOMS
VOMITING: 0
RHINORRHEA: 0
SHORTNESS OF BREATH: 0
EYE REDNESS: 0
SORE THROAT: 0
CONSTIPATION: 0
COUGH: 0
BACK PAIN: 1
DIARRHEA: 0

## 2023-12-08 NOTE — PROGRESS NOTES
After obtaining consent, and per orders of GLYNN MCKAY, injection of 40MG KENALOG given in Left upper quad. gluteus  by Raudel Patel. Patient tolerated well. Medication was not supplied by patient. After obtaining consent, and per orders of GLYNN MCKAY, injection of 4MG DECADRON given in Left upper quad. gluteus  by Raudel Patel. Patient tolerated well. Medication was not supplied by patient.

## 2024-03-11 ENCOUNTER — TELEPHONE (OUTPATIENT)
Dept: FAMILY MEDICINE CLINIC | Age: 54
End: 2024-03-11

## 2024-03-11 ENCOUNTER — OFFICE VISIT (OUTPATIENT)
Dept: FAMILY MEDICINE CLINIC | Age: 54
End: 2024-03-11
Payer: COMMERCIAL

## 2024-03-11 VITALS
WEIGHT: 225.5 LBS | DIASTOLIC BLOOD PRESSURE: 82 MMHG | TEMPERATURE: 96.9 F | OXYGEN SATURATION: 98 % | SYSTOLIC BLOOD PRESSURE: 120 MMHG | HEIGHT: 63 IN | HEART RATE: 78 BPM | BODY MASS INDEX: 39.95 KG/M2

## 2024-03-11 DIAGNOSIS — M62.838 MUSCLE SPASM: ICD-10-CM

## 2024-03-11 DIAGNOSIS — E78.5 MILD HYPERLIPIDEMIA: ICD-10-CM

## 2024-03-11 DIAGNOSIS — I10 PRIMARY HYPERTENSION: ICD-10-CM

## 2024-03-11 DIAGNOSIS — K21.9 GASTROESOPHAGEAL REFLUX DISEASE, UNSPECIFIED WHETHER ESOPHAGITIS PRESENT: ICD-10-CM

## 2024-03-11 DIAGNOSIS — Z11.59 ENCOUNTER FOR HEPATITIS C SCREENING TEST FOR LOW RISK PATIENT: ICD-10-CM

## 2024-03-11 DIAGNOSIS — D75.1 POLYCYTHEMIA: ICD-10-CM

## 2024-03-11 DIAGNOSIS — Z11.4 SCREENING FOR HIV WITHOUT PRESENCE OF RISK FACTORS: ICD-10-CM

## 2024-03-11 DIAGNOSIS — M54.16 LUMBAR RADICULOPATHY: Primary | ICD-10-CM

## 2024-03-11 DIAGNOSIS — J45.20 MILD INTERMITTENT ASTHMA WITHOUT COMPLICATION: ICD-10-CM

## 2024-03-11 DIAGNOSIS — M54.40 BACK PAIN OF LUMBAR REGION WITH SCIATICA: ICD-10-CM

## 2024-03-11 DIAGNOSIS — R79.89 LOW TESTOSTERONE IN MALE: ICD-10-CM

## 2024-03-11 PROCEDURE — 99214 OFFICE O/P EST MOD 30 MIN: CPT | Performed by: PEDIATRICS

## 2024-03-11 PROCEDURE — 3074F SYST BP LT 130 MM HG: CPT | Performed by: PEDIATRICS

## 2024-03-11 PROCEDURE — 3079F DIAST BP 80-89 MM HG: CPT | Performed by: PEDIATRICS

## 2024-03-11 RX ORDER — ALBUTEROL SULFATE 90 UG/1
2 AEROSOL, METERED RESPIRATORY (INHALATION) EVERY 4 HOURS PRN
Qty: 3 EACH | Refills: 3 | Status: SHIPPED | OUTPATIENT
Start: 2024-03-11

## 2024-03-11 RX ORDER — FAMOTIDINE 40 MG/1
40 TABLET, FILM COATED ORAL EVERY EVENING
Qty: 90 TABLET | Refills: 3 | Status: SHIPPED | OUTPATIENT
Start: 2024-03-11

## 2024-03-11 RX ORDER — TESTOSTERONE CYPIONATE 200 MG/ML
1.5 VIAL (ML) INTRAMUSCULAR WEEKLY
Qty: 18 ML | Refills: 0 | Status: SHIPPED | OUTPATIENT
Start: 2024-03-11

## 2024-03-11 RX ORDER — LISINOPRIL 20 MG/1
20 TABLET ORAL DAILY
Qty: 90 TABLET | Refills: 3 | Status: SHIPPED | OUTPATIENT
Start: 2024-03-11 | End: 2024-03-11

## 2024-03-11 RX ORDER — METHYLPREDNISOLONE 4 MG/1
TABLET ORAL
Qty: 1 KIT | Refills: 0 | Status: SHIPPED | OUTPATIENT
Start: 2024-03-11 | End: 2024-03-17

## 2024-03-11 RX ORDER — GABAPENTIN 100 MG/1
100 CAPSULE ORAL 2 TIMES DAILY
Qty: 180 CAPSULE | Refills: 1 | Status: SHIPPED | OUTPATIENT
Start: 2024-03-11 | End: 2024-09-07

## 2024-03-11 RX ORDER — CYCLOBENZAPRINE HCL 10 MG
10 TABLET ORAL 3 TIMES DAILY PRN
Qty: 90 TABLET | Refills: 3 | Status: SHIPPED | OUTPATIENT
Start: 2024-03-11

## 2024-03-11 RX ORDER — TIZANIDINE 4 MG/1
4 TABLET ORAL 3 TIMES DAILY PRN
Qty: 180 TABLET | Refills: 3 | Status: SHIPPED | OUTPATIENT
Start: 2024-03-11

## 2024-03-11 SDOH — ECONOMIC STABILITY: INCOME INSECURITY: HOW HARD IS IT FOR YOU TO PAY FOR THE VERY BASICS LIKE FOOD, HOUSING, MEDICAL CARE, AND HEATING?: NOT HARD AT ALL

## 2024-03-11 SDOH — ECONOMIC STABILITY: FOOD INSECURITY: WITHIN THE PAST 12 MONTHS, YOU WORRIED THAT YOUR FOOD WOULD RUN OUT BEFORE YOU GOT MONEY TO BUY MORE.: NEVER TRUE

## 2024-03-11 SDOH — ECONOMIC STABILITY: FOOD INSECURITY: WITHIN THE PAST 12 MONTHS, THE FOOD YOU BOUGHT JUST DIDN'T LAST AND YOU DIDN'T HAVE MONEY TO GET MORE.: NEVER TRUE

## 2024-03-11 SDOH — ECONOMIC STABILITY: HOUSING INSECURITY
IN THE LAST 12 MONTHS, WAS THERE A TIME WHEN YOU DID NOT HAVE A STEADY PLACE TO SLEEP OR SLEPT IN A SHELTER (INCLUDING NOW)?: NO

## 2024-03-11 ASSESSMENT — PATIENT HEALTH QUESTIONNAIRE - PHQ9
SUM OF ALL RESPONSES TO PHQ QUESTIONS 1-9: 0
2. FEELING DOWN, DEPRESSED OR HOPELESS: 0
1. LITTLE INTEREST OR PLEASURE IN DOING THINGS: 0
SUM OF ALL RESPONSES TO PHQ9 QUESTIONS 1 & 2: 0
SUM OF ALL RESPONSES TO PHQ QUESTIONS 1-9: 0

## 2024-03-11 ASSESSMENT — ENCOUNTER SYMPTOMS
EYES NEGATIVE: 1
RESPIRATORY NEGATIVE: 1
ALLERGIC/IMMUNOLOGIC NEGATIVE: 1
GASTROINTESTINAL NEGATIVE: 1

## 2024-03-11 NOTE — TELEPHONE ENCOUNTER
Yes, he takes Flexeril at bedtime and tizanidine during the day which is why 90 for 3-month supply of Flexeril and 180 for 3-month supply of tizanidine (administered every 8 hours as needed).

## 2024-03-11 NOTE — PROGRESS NOTES
[address] [place] [address]  Phone [phone]   Fax [phone]      OFFICE VISIT: 3/11/2024    Catrachito Coppola-: 1970      HPI  Reason For Visit:  Catrachito is a 53 y.o.     Follow-up (Requesting 90 day supplies - has 90 days told patient may need to talk to [facility] as to why not giving him 90 days)    Patient presents on follow-up for multiple health issues.  Present concerns:  No new concerns.    He did note that he is only getting his prescriptions on a monthly basis.  He would like to get 3-month supplies of his medications.  He does need refills of several medications.      Hypertension:   BP today was   BP Readings from Last 1 Encounters:   24 120/82      Recent BP readings:    BP Readings from Last 3 Encounters:   24 120/82   23 120/86   23 (!) 148/102     Medication   Lisinopril 20 mg daily  Medication compliance:  compliant most of the time  Home blood pressure monitoring: Yes -controlled when checked.    He  is mostly  adherent to a low sodium diet.     Symptoms: None  Laboratory:  Lab Results   Component Value Date    BUN 13 2023    CREATININE 0.8 2023       Mild hyperlipidemia:   Medication:   None  Low Fat, Low Choleterol Diet:  yes -he tries  Myalgias or GI upset: no  The patient exercises intermittently.  Laboratory:    Lab Results   Component Value Date    CHOL 182 2023    TRIG 137 2023    HDL 33 (L) 2023    LDLCALC 122 2023      Lab Results   Component Value Date    ALT 32 2023    AST 33 2023         Mild intermittent asthma:  Medication:   Albuterol HFA, 2 puffs Q normal 4 hours as needed  Symptoms: Fairly well-controlled this time of year.  Known triggers: no known triggers.      Low testosterone:  Medication:   Testosterone cypionate (200 mg/ML) 1.5 ml IM weekly  Symptoms: This does treat his symptoms appropriately.  Without it he has fatigue and decreased libido.  Last

## 2024-03-11 NOTE — TELEPHONE ENCOUNTER
Pharmacy sent a fax questioning if patient takes both   cyclobenzaprine (FLEXERIL) 10 MG tablet  & tiZANidine (ZANAFLEX) 4 MG tablet     These were filled on the same day is pt supposed to be taking both?

## 2024-09-11 ENCOUNTER — OFFICE VISIT (OUTPATIENT)
Dept: FAMILY MEDICINE CLINIC | Age: 54
End: 2024-09-11
Payer: COMMERCIAL

## 2024-09-11 VITALS
DIASTOLIC BLOOD PRESSURE: 82 MMHG | WEIGHT: 216.25 LBS | SYSTOLIC BLOOD PRESSURE: 130 MMHG | BODY MASS INDEX: 38.32 KG/M2 | TEMPERATURE: 97.9 F | HEART RATE: 78 BPM | OXYGEN SATURATION: 98 % | HEIGHT: 63 IN

## 2024-09-11 DIAGNOSIS — J01.90 ACUTE BACTERIAL SINUSITIS: ICD-10-CM

## 2024-09-11 DIAGNOSIS — R79.89 LOW TESTOSTERONE IN MALE: ICD-10-CM

## 2024-09-11 DIAGNOSIS — B96.89 ACUTE BACTERIAL SINUSITIS: ICD-10-CM

## 2024-09-11 DIAGNOSIS — M25.551 RIGHT HIP PAIN: ICD-10-CM

## 2024-09-11 DIAGNOSIS — J45.20 MILD INTERMITTENT ASTHMA WITHOUT COMPLICATION: ICD-10-CM

## 2024-09-11 DIAGNOSIS — M25.561 CHRONIC PAIN OF RIGHT KNEE: Primary | ICD-10-CM

## 2024-09-11 DIAGNOSIS — G89.29 CHRONIC PAIN OF RIGHT KNEE: Primary | ICD-10-CM

## 2024-09-11 DIAGNOSIS — M62.838 MUSCLE SPASM: ICD-10-CM

## 2024-09-11 PROCEDURE — 3079F DIAST BP 80-89 MM HG: CPT | Performed by: PEDIATRICS

## 2024-09-11 PROCEDURE — 3075F SYST BP GE 130 - 139MM HG: CPT | Performed by: PEDIATRICS

## 2024-09-11 PROCEDURE — 99214 OFFICE O/P EST MOD 30 MIN: CPT | Performed by: PEDIATRICS

## 2024-09-11 RX ORDER — ALBUTEROL SULFATE 90 UG/1
2 AEROSOL, METERED RESPIRATORY (INHALATION) EVERY 4 HOURS PRN
Qty: 3 EACH | Refills: 3 | Status: SHIPPED | OUTPATIENT
Start: 2024-09-11

## 2024-09-11 RX ORDER — DOXYCYCLINE 100 MG/1
100 CAPSULE ORAL 2 TIMES DAILY
Qty: 20 CAPSULE | Refills: 0 | Status: SHIPPED | OUTPATIENT
Start: 2024-09-11 | End: 2024-09-21

## 2024-09-11 RX ORDER — TESTOSTERONE CYPIONATE 200 MG/ML
1.5 VIAL (ML) INTRAMUSCULAR WEEKLY
Qty: 18 ML | Refills: 0 | Status: SHIPPED | OUTPATIENT
Start: 2024-09-11

## 2024-09-11 SDOH — ECONOMIC STABILITY: FOOD INSECURITY: WITHIN THE PAST 12 MONTHS, YOU WORRIED THAT YOUR FOOD WOULD RUN OUT BEFORE YOU GOT MONEY TO BUY MORE.: NEVER TRUE

## 2024-09-11 SDOH — ECONOMIC STABILITY: FOOD INSECURITY: WITHIN THE PAST 12 MONTHS, THE FOOD YOU BOUGHT JUST DIDN'T LAST AND YOU DIDN'T HAVE MONEY TO GET MORE.: NEVER TRUE

## 2024-09-11 SDOH — ECONOMIC STABILITY: INCOME INSECURITY: HOW HARD IS IT FOR YOU TO PAY FOR THE VERY BASICS LIKE FOOD, HOUSING, MEDICAL CARE, AND HEATING?: NOT HARD AT ALL

## 2024-09-11 ASSESSMENT — PATIENT HEALTH QUESTIONNAIRE - PHQ9
SUM OF ALL RESPONSES TO PHQ QUESTIONS 1-9: 0
1. LITTLE INTEREST OR PLEASURE IN DOING THINGS: NOT AT ALL
SUM OF ALL RESPONSES TO PHQ9 QUESTIONS 1 & 2: 0
SUM OF ALL RESPONSES TO PHQ QUESTIONS 1-9: 0
SUM OF ALL RESPONSES TO PHQ QUESTIONS 1-9: 0
2. FEELING DOWN, DEPRESSED OR HOPELESS: NOT AT ALL
SUM OF ALL RESPONSES TO PHQ QUESTIONS 1-9: 0

## 2024-09-11 ASSESSMENT — ENCOUNTER SYMPTOMS
RHINORRHEA: 1
GASTROINTESTINAL NEGATIVE: 1
SINUS PRESSURE: 1
ALLERGIC/IMMUNOLOGIC NEGATIVE: 1
RESPIRATORY NEGATIVE: 1
SINUS PAIN: 1
EYES NEGATIVE: 1

## 2024-09-16 ENCOUNTER — HOSPITAL ENCOUNTER (OUTPATIENT)
Dept: GENERAL RADIOLOGY | Age: 54
Discharge: HOME | End: 2024-09-16
Payer: COMMERCIAL

## 2024-09-16 DIAGNOSIS — M25.561 CHRONIC PAIN OF RIGHT KNEE: ICD-10-CM

## 2024-09-16 DIAGNOSIS — Z11.4 SCREENING FOR HIV WITHOUT PRESENCE OF RISK FACTORS: ICD-10-CM

## 2024-09-16 DIAGNOSIS — R79.89 LOW TESTOSTERONE IN MALE: ICD-10-CM

## 2024-09-16 DIAGNOSIS — I10 PRIMARY HYPERTENSION: ICD-10-CM

## 2024-09-16 DIAGNOSIS — G89.29 CHRONIC PAIN OF RIGHT KNEE: ICD-10-CM

## 2024-09-16 DIAGNOSIS — Z11.59 ENCOUNTER FOR HEPATITIS C SCREENING TEST FOR LOW RISK PATIENT: ICD-10-CM

## 2024-09-16 DIAGNOSIS — D75.1 POLYCYTHEMIA: ICD-10-CM

## 2024-09-16 DIAGNOSIS — M25.551 RIGHT HIP PAIN: ICD-10-CM

## 2024-09-16 LAB
ALBUMIN SERPL-MCNC: 4.3 G/DL (ref 3.5–5.2)
ALP SERPL-CCNC: 109 U/L (ref 40–129)
ALT SERPL-CCNC: 49 U/L (ref 5–41)
ANION GAP SERPL CALCULATED.3IONS-SCNC: 14 MMOL/L (ref 7–19)
AST SERPL-CCNC: 32 U/L (ref 5–40)
BASOPHILS # BLD: 0.1 K/UL (ref 0–0.2)
BASOPHILS NFR BLD: 0.7 % (ref 0–1)
BILIRUB SERPL-MCNC: 0.4 MG/DL (ref 0.2–1.2)
BUN SERPL-MCNC: 18 MG/DL (ref 6–20)
CALCIUM SERPL-MCNC: 9.3 MG/DL (ref 8.6–10)
CHLORIDE SERPL-SCNC: 100 MMOL/L (ref 98–111)
CHOLEST SERPL-MCNC: 197 MG/DL (ref 0–199)
CO2 SERPL-SCNC: 24 MMOL/L (ref 22–29)
CREAT SERPL-MCNC: 0.8 MG/DL (ref 0.7–1.2)
CREAT UR-MCNC: 357.2 MG/DL (ref 39–259)
EOSINOPHIL # BLD: 0.3 K/UL (ref 0–0.6)
EOSINOPHIL NFR BLD: 4.4 % (ref 0–5)
ERYTHROCYTE [DISTWIDTH] IN BLOOD BY AUTOMATED COUNT: 12.2 % (ref 11.5–14.5)
GLUCOSE SERPL-MCNC: 103 MG/DL (ref 70–99)
HCT VFR BLD AUTO: 51.5 % (ref 42–52)
HCV AB SERPL QL IA: NORMAL
HDLC SERPL-MCNC: 35 MG/DL (ref 40–60)
HGB BLD-MCNC: 16.9 G/DL (ref 14–18)
HIV-1 P24 AG: NORMAL
HIV1+2 AB SERPLBLD QL IA.RAPID: NORMAL
IMM GRANULOCYTES # BLD: 0 K/UL
LDLC SERPL CALC-MCNC: 130 MG/DL
LYMPHOCYTES # BLD: 1.1 K/UL (ref 1.1–4.5)
LYMPHOCYTES NFR BLD: 15.6 % (ref 20–40)
MCH RBC QN AUTO: 30.2 PG (ref 27–31)
MCHC RBC AUTO-ENTMCNC: 32.8 G/DL (ref 33–37)
MCV RBC AUTO: 92 FL (ref 80–94)
MICROALBUMIN UR-MCNC: 7.8 MG/DL (ref 0–1.99)
MICROALBUMIN/CREAT UR-RTO: 21.8 MG/G
MONOCYTES # BLD: 1.2 K/UL (ref 0–0.9)
MONOCYTES NFR BLD: 17.5 % (ref 0–10)
NEUTROPHILS # BLD: 4.2 K/UL (ref 1.5–7.5)
NEUTS SEG NFR BLD: 61.4 % (ref 50–65)
PLATELET # BLD AUTO: 153 K/UL (ref 130–400)
PMV BLD AUTO: 12.6 FL (ref 9.4–12.4)
POTASSIUM SERPL-SCNC: 4.1 MMOL/L (ref 3.5–5)
PROT SERPL-MCNC: 7.6 G/DL (ref 6.4–8.3)
RBC # BLD AUTO: 5.6 M/UL (ref 4.7–6.1)
SODIUM SERPL-SCNC: 138 MMOL/L (ref 136–145)
T4 FREE SERPL-MCNC: 1.18 NG/DL (ref 0.93–1.7)
TRIGL SERPL-MCNC: 162 MG/DL (ref 0–149)
TSH SERPL DL<=0.005 MIU/L-ACNC: 1.71 UIU/ML (ref 0.27–4.2)
WBC # BLD AUTO: 6.8 K/UL (ref 4.8–10.8)

## 2024-09-16 PROCEDURE — 73502 X-RAY EXAM HIP UNI 2-3 VIEWS: CPT

## 2024-09-16 PROCEDURE — 73562 X-RAY EXAM OF KNEE 3: CPT

## 2024-09-17 DIAGNOSIS — B96.89 ACUTE BACTERIAL SINUSITIS: ICD-10-CM

## 2024-09-17 DIAGNOSIS — R89.9 ELEVATED LABORATORY TEST RESULT: Primary | ICD-10-CM

## 2024-09-17 DIAGNOSIS — J01.90 ACUTE BACTERIAL SINUSITIS: ICD-10-CM

## 2024-09-18 RX ORDER — AZITHROMYCIN 500 MG/1
500 TABLET, FILM COATED ORAL DAILY
Qty: 3 TABLET | Refills: 0 | Status: SHIPPED | OUTPATIENT
Start: 2024-09-18 | End: 2024-09-21

## 2024-09-20 LAB
SHBG SERPL-SCNC: 22.6 PG/ML (ref 47–244)
SHBG SERPL-SCNC: 33 NMOL/L (ref 19–76)
TESTOST SERPL-MCNC: 122 NG/DL (ref 193–740)

## 2024-09-24 ENCOUNTER — TELEPHONE (OUTPATIENT)
Dept: FAMILY MEDICINE CLINIC | Age: 54
End: 2024-09-24

## 2024-09-24 DIAGNOSIS — R79.89 LOW TESTOSTERONE IN MALE: Primary | ICD-10-CM

## 2024-09-30 ENCOUNTER — TELEPHONE (OUTPATIENT)
Dept: FAMILY MEDICINE CLINIC | Age: 54
End: 2024-09-30

## 2024-09-30 NOTE — TELEPHONE ENCOUNTER
Pt called regarding his Xray results- I made him aware that these results just came in this AM and Dr Lilly has not had the chance to review them yet but we will call once he does review.    Pt requested most recent lab results be mailed to his address so he has a copy - mailed

## 2024-10-01 DIAGNOSIS — G89.29 CHRONIC PAIN OF RIGHT KNEE: Primary | ICD-10-CM

## 2024-10-01 DIAGNOSIS — M25.561 CHRONIC PAIN OF RIGHT KNEE: Primary | ICD-10-CM

## 2024-10-01 DIAGNOSIS — M25.551 RIGHT HIP PAIN: ICD-10-CM

## 2024-10-01 RX ORDER — MELOXICAM 15 MG/1
15 TABLET ORAL DAILY
Qty: 30 TABLET | Refills: 5 | Status: SHIPPED | OUTPATIENT
Start: 2024-10-01

## 2024-10-01 RX ORDER — MELOXICAM 15 MG/1
15 TABLET ORAL DAILY
Qty: 90 TABLET | Refills: 1 | Status: CANCELLED | OUTPATIENT
Start: 2024-10-01

## 2024-10-01 NOTE — TELEPHONE ENCOUNTER
----- Message from Dr. PHILL Lilly DO sent at 9/30/2024  6:00 PM CDT -----  Arthritis is seen in bilateral hips.  The radiologist called this mild.  Typically we would treat this with an anti-inflammatory such as meloxicam or nabumetone.  If he would like to try 1 of these medications, they could make a significant difference in your symptoms.  Meloxicam has the lowest incidence of stomach problems but is not quite as strong.  Nabumetone is stronger but can cause ulcers in some individuals.

## 2024-10-01 NOTE — TELEPHONE ENCOUNTER
----- Message from Dr. PHILL Lilly DO sent at 9/30/2024  6:01 PM CDT -----  Right knee on the other hand has significant arthritic changes.  Options would include a medication such as meloxicam or nabumetone, an injection in the knee, or we could refer to orthopedic surgery

## 2024-10-01 NOTE — TELEPHONE ENCOUNTER
Meloxicam 15 mg daily  Dispense #30 with 5 refills.  Alternative prescription is #90 with 1 refills

## 2024-10-01 NOTE — TELEPHONE ENCOUNTER
Called patient, spoke with: Patient regarding the results of the patients most recent Xrays.  I advised Patient of Dr. Lilly recommendations.   Patient did voice understanding      Referral placed to Ortho - pt would like to try Meloxicam what dose would you like sent in?

## 2024-10-02 ENCOUNTER — OFFICE VISIT (OUTPATIENT)
Dept: FAMILY MEDICINE CLINIC | Age: 54
End: 2024-10-02

## 2024-10-02 VITALS
HEIGHT: 63 IN | HEART RATE: 62 BPM | WEIGHT: 216.25 LBS | OXYGEN SATURATION: 96 % | BODY MASS INDEX: 38.32 KG/M2 | DIASTOLIC BLOOD PRESSURE: 98 MMHG | TEMPERATURE: 96.9 F | SYSTOLIC BLOOD PRESSURE: 144 MMHG

## 2024-10-02 DIAGNOSIS — G89.29 CHRONIC PAIN OF RIGHT KNEE: ICD-10-CM

## 2024-10-02 DIAGNOSIS — M17.11 PRIMARY OSTEOARTHRITIS OF RIGHT KNEE: Primary | ICD-10-CM

## 2024-10-02 DIAGNOSIS — M25.561 CHRONIC PAIN OF RIGHT KNEE: ICD-10-CM

## 2024-10-02 RX ORDER — LIDOCAINE HYDROCHLORIDE 10 MG/ML
5 INJECTION, SOLUTION EPIDURAL; INFILTRATION; INTRACAUDAL; PERINEURAL ONCE
Status: SHIPPED | OUTPATIENT
Start: 2024-10-02

## 2024-10-02 RX ORDER — TRIAMCINOLONE ACETONIDE 40 MG/ML
80 INJECTION, SUSPENSION INTRA-ARTICULAR; INTRAMUSCULAR ONCE
Status: COMPLETED | OUTPATIENT
Start: 2024-10-02 | End: 2024-10-02

## 2024-10-02 RX ORDER — LIDOCAINE HYDROCHLORIDE 10 MG/ML
5 INJECTION, SOLUTION INFILTRATION; PERINEURAL ONCE
Status: COMPLETED | OUTPATIENT
Start: 2024-10-02 | End: 2024-10-02

## 2024-10-02 RX ORDER — TRIAMCINOLONE ACETONIDE 40 MG/ML
80 INJECTION, SUSPENSION INTRA-ARTICULAR; INTRAMUSCULAR ONCE
Status: CANCELLED | OUTPATIENT
Start: 2024-10-02 | End: 2024-10-02

## 2024-10-02 RX ADMIN — LIDOCAINE HYDROCHLORIDE 5 ML: 10 INJECTION, SOLUTION INFILTRATION; PERINEURAL at 12:45

## 2024-10-02 RX ADMIN — TRIAMCINOLONE ACETONIDE 80 MG: 40 INJECTION, SUSPENSION INTRA-ARTICULAR; INTRAMUSCULAR at 12:46

## 2024-10-02 NOTE — PROGRESS NOTES
effort is normal.      Breath sounds: Normal breath sounds.   Musculoskeletal:         General: Normal range of motion.      Cervical back: Normal range of motion.      Right knee: Tenderness (Injection, see below) present.   Skin:     General: Skin is dry.   Neurological:      General: No focal deficit present.      Mental Status: He is alert and oriented to person, place, and time. Mental status is at baseline.   Psychiatric:         Mood and Affect: Mood normal.         Behavior: Behavior normal.         Thought Content: Thought content normal.         Judgment: Judgment normal.                 An electronic signature was used to authenticate this note.    --WOODY Melo     Knee Arthrocentesis with Injection Procedure Note    Pre-operative Diagnosis: right osteoarthritis    Post-operative Diagnosis: same    Indications: Symptom relief from osteoarthritis    Anesthesia: Lidocaine 1% without epinephrine without added sodium bicarbonate    Procedure Details     Verbal consent was obtained for the procedure. The joint was prepped with chlorhexidine. A 22 gauge needle was inserted into the inferior aspect of the joint from a lateral approach.  5 ml 1% lidocaine and 2 ml of triamcinolone (KENALOG) 40mg/ml was then injected into the joint through the same needle. The needle was removed and the area cleansed and dressed.    Complications:  None; patient tolerated the procedure well.

## 2024-10-07 DIAGNOSIS — R89.9 ELEVATED LABORATORY TEST RESULT: ICD-10-CM

## 2024-10-07 DIAGNOSIS — R79.89 LOW TESTOSTERONE IN MALE: Primary | ICD-10-CM

## 2024-10-07 DIAGNOSIS — G62.9 NEUROPATHY: ICD-10-CM

## 2024-10-07 DIAGNOSIS — R79.89 LOW TESTOSTERONE IN MALE: ICD-10-CM

## 2024-10-07 LAB
ALBUMIN SERPL-MCNC: 4.1 G/DL (ref 3.5–5.2)
ALP SERPL-CCNC: 84 U/L (ref 40–129)
ALT SERPL-CCNC: 62 U/L (ref 5–41)
ANION GAP SERPL CALCULATED.3IONS-SCNC: 12 MMOL/L (ref 7–19)
AST SERPL-CCNC: 39 U/L (ref 5–40)
BASOPHILS # BLD: 0.1 K/UL (ref 0–0.2)
BASOPHILS NFR BLD: 0.7 % (ref 0–1)
BILIRUB SERPL-MCNC: 0.5 MG/DL (ref 0.2–1.2)
BUN SERPL-MCNC: 15 MG/DL (ref 6–20)
CALCIUM SERPL-MCNC: 10 MG/DL (ref 8.6–10)
CHLORIDE SERPL-SCNC: 103 MMOL/L (ref 98–111)
CHOLEST SERPL-MCNC: 220 MG/DL (ref 0–199)
CO2 SERPL-SCNC: 26 MMOL/L (ref 22–29)
CREAT SERPL-MCNC: 0.7 MG/DL (ref 0.7–1.2)
EOSINOPHIL # BLD: 0.3 K/UL (ref 0–0.6)
EOSINOPHIL NFR BLD: 2.5 % (ref 0–5)
ERYTHROCYTE [DISTWIDTH] IN BLOOD BY AUTOMATED COUNT: 12.6 % (ref 11.5–14.5)
GLUCOSE SERPL-MCNC: 127 MG/DL (ref 70–99)
HCT VFR BLD AUTO: 52.3 % (ref 42–52)
HDLC SERPL-MCNC: 37 MG/DL (ref 40–60)
HGB BLD-MCNC: 17.2 G/DL (ref 14–18)
IMM GRANULOCYTES # BLD: 0.3 K/UL
LDLC SERPL CALC-MCNC: 112 MG/DL
LYMPHOCYTES # BLD: 3 K/UL (ref 1.1–4.5)
LYMPHOCYTES NFR BLD: 25.9 % (ref 20–40)
MCH RBC QN AUTO: 30.1 PG (ref 27–31)
MCHC RBC AUTO-ENTMCNC: 32.9 G/DL (ref 33–37)
MCV RBC AUTO: 91.4 FL (ref 80–94)
MONOCYTES # BLD: 1.1 K/UL (ref 0–0.9)
MONOCYTES NFR BLD: 9.2 % (ref 0–10)
NEUTROPHILS # BLD: 7 K/UL (ref 1.5–7.5)
NEUTS SEG NFR BLD: 59.1 % (ref 50–65)
PLATELET # BLD AUTO: 187 K/UL (ref 130–400)
PMV BLD AUTO: 11.8 FL (ref 9.4–12.4)
POTASSIUM SERPL-SCNC: 4 MMOL/L (ref 3.5–5)
PROT SERPL-MCNC: 7.3 G/DL (ref 6.4–8.3)
RBC # BLD AUTO: 5.72 M/UL (ref 4.7–6.1)
SODIUM SERPL-SCNC: 141 MMOL/L (ref 136–145)
TRIGL SERPL-MCNC: 357 MG/DL (ref 0–149)
VIT B12 SERPL-MCNC: 504 PG/ML (ref 232–1245)
WBC # BLD AUTO: 11.8 K/UL (ref 4.8–10.8)

## 2024-10-07 NOTE — PROGRESS NOTES
[address] [place] [address]  Phone [phone]   Fax [phone]      OFFICE VISIT: 10/7/2024    Catrachito Coppola-: 1970      HPI  Reason For Visit:  Catrachito is a 54 y.o.     No chief complaint on file.       vitals were not taken for this visit.      There is no height or weight on file to calculate BMI.      I have reviewed the following with the Mr. Coppola   Lab Review  Orders Only on 2024   Component Date Value    Rapid HIV 1&2 2024 Non-reactive     HIV-1 P24 Ag 2024 Non-reactive     Hep C Ab Interp 2024 Non-Reactive     TSH 2024 1.710     T4 Free 2024 1.18     Microalb, Ur 2024 7.80 (H)     Creatinine, Ur 2024 357.2 (H)     Microalb/Creat Ratio 2024 21.8     Cholesterol, Total 2024 197     Triglycerides 2024 162 (H)     HDL 2024 35 (L)     LDL Cholesterol 2024 130 (H)     Sodium 2024 138     Potassium 2024 4.1     Chloride 2024 100     CO2 2024 24     Anion Gap 2024 14     Glucose 2024 103 (H)     BUN 2024 18     Creatinine 2024 0.8     Est, Glom Filt Rate 2024 >90     Calcium 2024 9.3     Total Protein 2024 7.6     Albumin 2024 4.3     Total Bilirubin 2024 0.4     Alkaline Phosphatase 2024 109     ALT 2024 49 (H)     AST 2024 32     WBC 2024 6.8     RBC 2024 5.60     Hemoglobin 2024 16.9     Hematocrit 2024 51.5     MCV 2024 92.0     MCH 2024 30.2     MCHC 2024 32.8 (L)     RDW 2024 12.2     Platelets 2024 153     MPV 2024 12.6 (H)     Neutrophils % 2024 61.4     Lymphocytes % 2024 15.6 (L)     Monocytes % 2024 17.5 (H)     Eosinophils % 2024 4.4     Basophils % 2024 0.7     Neutrophils Absolute 2024 4.2     Immature Granulocytes # 2024 0.0     Lymphocytes Absolute 2024 1.1

## 2024-10-08 ENCOUNTER — TELEPHONE (OUTPATIENT)
Dept: FAMILY MEDICINE CLINIC | Age: 54
End: 2024-10-08

## 2024-10-08 LAB
SHBG SERPL-SCNC: 17 NMOL/L (ref 19–76)
SHBG SERPL-SCNC: 227.4 PG/ML (ref 47–244)
TESTOST SERPL-MCNC: 749 NG/DL (ref 193–740)

## 2024-10-08 NOTE — TELEPHONE ENCOUNTER
Called patient, spoke with: Patient regarding the results of the patients most recent labs.  I advised Patient of Dr. Lilly recommendations.   Patient did voice understanding    Pt requested results be mailed to address listed - mailed

## 2024-10-08 NOTE — TELEPHONE ENCOUNTER
----- Message from Dr. PHILL Lilly DO sent at 10/8/2024  2:26 PM CDT -----  Testosterone is in the normal range.  Your metabolic profile is normal.  This includes kidney and liver functions as well as electrolytes.  Lipids are mildly elevated but within range and you can get this down with diet and exercise.  CBC is normal without any evidence of viscosity issues.  Vitamin B12 level is normal

## 2024-10-24 ENCOUNTER — OFFICE VISIT (OUTPATIENT)
Age: 54
End: 2024-10-24
Payer: COMMERCIAL

## 2024-10-24 VITALS — HEIGHT: 64 IN | BODY MASS INDEX: 38.24 KG/M2 | WEIGHT: 224 LBS

## 2024-10-24 DIAGNOSIS — G89.29 CHRONIC PAIN OF RIGHT KNEE: Primary | ICD-10-CM

## 2024-10-24 DIAGNOSIS — M25.561 CHRONIC PAIN OF RIGHT KNEE: Primary | ICD-10-CM

## 2024-10-24 DIAGNOSIS — M17.11 PRIMARY LOCALIZED OSTEOARTHRITIS OF RIGHT KNEE: ICD-10-CM

## 2024-10-24 DIAGNOSIS — M23.203 OTHER OLD TEAR OF MEDIAL MENISCUS OF RIGHT KNEE: ICD-10-CM

## 2024-10-24 PROCEDURE — 99203 OFFICE O/P NEW LOW 30 MIN: CPT | Performed by: PHYSICIAN ASSISTANT

## 2024-10-24 ASSESSMENT — ENCOUNTER SYMPTOMS: SHORTNESS OF BREATH: 0

## 2024-10-24 NOTE — PROGRESS NOTES
[facility]  [facility]  [address] [place] [address]  Dept: [phone]  Dept Fax: [phone]  Loc: [phone]     Catrachito Coppola (:  1970) is a 54 y.o. male,New patient, here for evaluation of the following:    Chief Complaint   Patient presents with    Knee Pain     R knee pain - worsened of the last 6 mo.  Injured 30+ years ago.           Subjective   Patient is a 54-year-old  male present to the clinic with right knee pain.  He reports he has had knee pain for several years.  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  He reports that he also has had a previous injury in his quad tendon with a repair.  He reports pain on the inside of his knee.  He has seen another provider and had a steroid injection in the right knee 2 to 3 weeks ago.  He reports that injection did help some and he is able to move around better.  He does not take any oral anti-inflammatories due to the research that he is done and the effects they have on the rest of your body.    Knee Pain         Allergies   Allergen Reactions    Lortab [Hydrocodone-Acetaminophen] Itching     Past Surgical History:   Procedure Laterality Date    COLONOSCOPY      KNEE SURGERY Right     NASAL SEPTUM SURGERY      SMALL INTESTINE SURGERY      Diverticulitis- removal     Social History     Tobacco Use    Smoking status: Never    Smokeless tobacco: Never   Substance Use Topics    Alcohol use: Yes     Comment: Occasionally    Drug use: No          Review of Systems   Constitutional:  Negative for fatigue and fever.   Respiratory:  Negative for shortness of breath.    Cardiovascular:  Negative for chest pain.   Musculoskeletal:  Positive for arthralgias and joint swelling.   Skin:  Negative for rash and wound.   Neurological:  Negative for weakness.   All other systems reviewed and are negative.         Objective   Physical Exam  Constitutional:       General: He is not in acute

## 2024-10-28 ENCOUNTER — TELEPHONE (OUTPATIENT)
Age: 54
End: 2024-10-28

## 2024-10-28 NOTE — TELEPHONE ENCOUNTER
NPR  MAMADOU         No precertification or post-service clinical review is required for the specific codes you have requested. The patient must stay within network for highest benefit level if it is a plan with out of network benefits. It is important that you verify benefits by going to [email or web address] or calling Customer Service.   [facility] INSURANCE PORTAL CHECK INSURANCE IF SHE COMES IN AFTER 1/1/2025

## 2024-12-05 ENCOUNTER — OFFICE VISIT (OUTPATIENT)
Age: 54
End: 2024-12-05

## 2024-12-05 VITALS — BODY MASS INDEX: 38.24 KG/M2 | WEIGHT: 224 LBS | HEIGHT: 64 IN

## 2024-12-05 DIAGNOSIS — G89.29 CHRONIC PAIN OF RIGHT KNEE: Primary | ICD-10-CM

## 2024-12-05 DIAGNOSIS — M25.561 CHRONIC PAIN OF RIGHT KNEE: Primary | ICD-10-CM

## 2024-12-05 DIAGNOSIS — M17.11 PRIMARY LOCALIZED OSTEOARTHRITIS OF RIGHT KNEE: ICD-10-CM

## 2024-12-05 RX ORDER — LIDOCAINE HYDROCHLORIDE 10 MG/ML
6 INJECTION, SOLUTION INFILTRATION; PERINEURAL ONCE
Status: COMPLETED | OUTPATIENT
Start: 2024-12-05 | End: 2024-12-06

## 2024-12-05 ASSESSMENT — ENCOUNTER SYMPTOMS: SHORTNESS OF BREATH: 0

## 2024-12-05 NOTE — PROGRESS NOTES
[facility]  [facility]  [address] [facility] [place] [address]  Dept: [phone]  Dept Fax: [phone]  Loc: [phone]     Catrachito Coppola (:  1970) is a 54 y.o. male,Established patient, here for evaluation of the following:    Chief Complaint   Patient presents with    Knee Pain     R knee - Durolane inj           Subjective   Patient is a 54-year-old  male came to the clinic for a right knee viscosupplementation injection.    Knee Pain         Allergies   Allergen Reactions    Lortab [Hydrocodone-Acetaminophen] Itching     Past Surgical History:   Procedure Laterality Date    COLONOSCOPY      KNEE SURGERY Right     NASAL SEPTUM SURGERY      SMALL INTESTINE SURGERY      Diverticulitis- removal     Social History     Tobacco Use    Smoking status: Never    Smokeless tobacco: Never   Substance Use Topics    Alcohol use: Yes     Comment: Occasionally    Drug use: No          Review of Systems   Constitutional:  Negative for fatigue and fever.   Respiratory:  Negative for shortness of breath.    Cardiovascular:  Negative for chest pain.   Musculoskeletal:  Positive for arthralgias.   Skin:  Negative for rash and wound.   Neurological:  Negative for weakness.   All other systems reviewed and are negative.         Objective   Physical Exam  Constitutional:       General: He is not in acute distress.     Appearance: Normal appearance.   HENT:      Head: Normocephalic.   Pulmonary:      Effort: Pulmonary effort is normal.   Musculoskeletal:      Comments: Upon inspection of the right knee there is no erythema, ecchymosis, deformity.  generalized tenderness about the knee.  Range of motion is within normal limits.  Extension is to 0 degrees and flexion is to approximately 120 degrees.  Valgus varus testing is negative.  Knee is stable.  Hip is supple.  Neurovascular intact distally.      Skin:     Findings: No bruising or erythema.

## 2024-12-06 RX ADMIN — LIDOCAINE HYDROCHLORIDE 6 ML: 10 INJECTION, SOLUTION INFILTRATION; PERINEURAL at 11:12

## 2025-01-27 ENCOUNTER — TELEPHONE (OUTPATIENT)
Age: 55
End: 2025-01-27

## 2025-01-27 DIAGNOSIS — D75.1 POLYCYTHEMIA: Primary | ICD-10-CM

## 2025-01-27 PROCEDURE — NBSRV: Performed by: PEDIATRICS

## 2025-01-27 NOTE — TELEPHONE ENCOUNTER
Pt called stating Saturday he attempted to donate blood to [facility] but they told him he could not due to his HGB being elevated 20.3 - he states previously Dr Lilly placed orders to [facility] for therapeutic phlebotomy    He would like order placed to have this completed again - okay for orders?

## 2025-01-28 NOTE — TELEPHONE ENCOUNTER
Please set up for phlebotomy at [facility].  Remove 1 pint of blood    I would also recommend that if he is utilizing testosterone, he should at a minimum back off, and consider discontinuing medication.

## 2025-01-30 DIAGNOSIS — Z12.11 SPECIAL SCREENING FOR MALIGNANT NEOPLASMS, COLON: Primary | ICD-10-CM

## 2025-02-03 DIAGNOSIS — D75.1 POLYCYTHEMIA: Primary | ICD-10-CM

## 2025-02-03 DIAGNOSIS — R79.89 LOW TESTOSTERONE IN MALE: Primary | ICD-10-CM

## 2025-02-04 LAB
HCT VFR BLD CALC: 61.4 % (ref 41–53)
HEMOGLOBIN: 19.9 G/DL (ref 13.5–17.5)

## 2025-02-06 ENCOUNTER — TELEPHONE (OUTPATIENT)
Age: 55
End: 2025-02-06

## 2025-02-06 DIAGNOSIS — R79.89 LOW TESTOSTERONE IN MALE: Primary | ICD-10-CM

## 2025-02-06 DIAGNOSIS — D75.1 POLYCYTHEMIA: ICD-10-CM

## 2025-02-06 LAB
SHBG SERPL-SCNC: 29 NMOL/L (ref 19–76)
TESTOST FREE MFR SERPL: 1.9 % (ref 1.6–2.9)
TESTOST FREE SERPL-MCNC: 40 PG/ML (ref 47–244)
TESTOST SERPL-MCNC: 214 NG/DL (ref 300–890)

## 2025-02-06 NOTE — TELEPHONE ENCOUNTER
Called patient, spoke with: Patient regarding the results of the patients most recent labs.  I advised Patient of Dr. Lilly recommendations.   Patient did voice understanding

## 2025-02-06 NOTE — TELEPHONE ENCOUNTER
----- Message from Dr. PHILL Lilly DO sent at 2/6/2025  9:39 AM CST -----  Recommend considering urology referral for subcutaneous testosterone pellet placement.  This can provide a very consistent level of testosterone replacement.  This way we can maximize testosterone without having complications of that therapy or at least minimizing them.  Pellets are typically placed every 3 months and are slow release for the medication.  These are located just below the skin, and are not typically painful or bothersome.

## 2025-02-20 ENCOUNTER — OFFICE VISIT (OUTPATIENT)
Dept: UROLOGY | Age: 55
End: 2025-02-20
Payer: COMMERCIAL

## 2025-02-20 ENCOUNTER — HOSPITAL ENCOUNTER (OUTPATIENT)
Dept: INFUSION THERAPY | Age: 55
Setting detail: INFUSION SERIES
Discharge: HOME | End: 2025-02-20
Payer: COMMERCIAL

## 2025-02-20 VITALS — TEMPERATURE: 98.2 F | WEIGHT: 216.2 LBS | HEIGHT: 63 IN | BODY MASS INDEX: 38.31 KG/M2

## 2025-02-20 VITALS
DIASTOLIC BLOOD PRESSURE: 96 MMHG | OXYGEN SATURATION: 95 % | RESPIRATION RATE: 16 BRPM | SYSTOLIC BLOOD PRESSURE: 121 MMHG | HEART RATE: 70 BPM

## 2025-02-20 DIAGNOSIS — D58.2 ELEVATED HEMOGLOBIN: Primary | ICD-10-CM

## 2025-02-20 DIAGNOSIS — E78.1 HIGH TRIGLYCERIDES: ICD-10-CM

## 2025-02-20 DIAGNOSIS — D75.1 SECONDARY ERYTHROCYTOSIS: ICD-10-CM

## 2025-02-20 DIAGNOSIS — R79.89 LOW TESTOSTERONE IN MALE: Primary | ICD-10-CM

## 2025-02-20 DIAGNOSIS — R79.89 LOW TESTOSTERONE IN MALE: ICD-10-CM

## 2025-02-20 DIAGNOSIS — G47.30 SLEEP APNEA, UNSPECIFIED TYPE: ICD-10-CM

## 2025-02-20 LAB
ALBUMIN SERPL-MCNC: 4.7 G/DL (ref 3.5–5.2)
ALP SERPL-CCNC: 89 U/L (ref 40–129)
ALT SERPL-CCNC: 63 U/L (ref 5–41)
ANION GAP SERPL CALCULATED.3IONS-SCNC: 15 MMOL/L (ref 8–16)
APPEARANCE FLUID: CLEAR
AST SERPL-CCNC: 51 U/L (ref 5–40)
BILIRUB SERPL-MCNC: 0.4 MG/DL (ref 0.2–1.2)
BILIRUBIN, POC: NORMAL
BLOOD URINE, POC: NORMAL
BUN SERPL-MCNC: 20 MG/DL (ref 6–20)
CALCIUM SERPL-MCNC: 10 MG/DL (ref 8.6–10)
CHLORIDE SERPL-SCNC: 100 MMOL/L (ref 98–107)
CHOLEST SERPL-MCNC: 200 MG/DL (ref 0–199)
CLARITY, POC: CLEAR
CO2 SERPL-SCNC: 27 MMOL/L (ref 22–29)
COLOR, POC: YELLOW
CREAT SERPL-MCNC: 0.8 MG/DL (ref 0.7–1.2)
ERYTHROCYTE [DISTWIDTH] IN BLOOD BY AUTOMATED COUNT: 13.4 % (ref 11.5–14.5)
FSH SERPL-SCNC: 0.8 MIU/ML
GLUCOSE SERPL-MCNC: 109 MG/DL (ref 70–99)
GLUCOSE URINE, POC: NORMAL MG/DL
HCT VFR BLD AUTO: 60.4 % (ref 42–52)
HDLC SERPL-MCNC: 39 MG/DL (ref 40–60)
HGB BLD-MCNC: 19.3 G/DL (ref 14–18)
KETONES, POC: NORMAL MG/DL
LDLC SERPL CALC-MCNC: 141 MG/DL
LEUKOCYTE EST, POC: NORMAL
LH SERPL-ACNC: 0.4 MIU/ML
MCH RBC QN AUTO: 28.2 PG (ref 27–31)
MCHC RBC AUTO-ENTMCNC: 32 G/DL (ref 33–37)
MCV RBC AUTO: 88.2 FL (ref 80–94)
NITRITE, POC: NORMAL
PH, POC: 5.5
PLATELET # BLD AUTO: 191 K/UL (ref 130–400)
PMV BLD AUTO: 12.1 FL (ref 9.4–12.4)
POTASSIUM SERPL-SCNC: 4.7 MMOL/L (ref 3.5–5.1)
PROLACTIN SERPL-MCNC: 3.83 NG/ML (ref 4.04–15.2)
PROT SERPL-MCNC: 8.3 G/DL (ref 6.4–8.3)
PROTEIN, POC: NORMAL MG/DL
PSA SERPL-MCNC: 0.61 NG/ML (ref 0–4)
RBC # BLD AUTO: 6.85 M/UL (ref 4.7–6.1)
SODIUM SERPL-SCNC: 142 MMOL/L (ref 136–145)
SPECIFIC GRAVITY, POC: 1.02
TESTOST SERPL-MCNC: 157.2 NG/DL (ref 193–740)
TRIGL SERPL-MCNC: 100 MG/DL (ref 0–149)
UROBILINOGEN, POC: 0.2 MG/DL
WBC # BLD AUTO: 7.5 K/UL (ref 4.8–10.8)

## 2025-02-20 PROCEDURE — 99205 OFFICE O/P NEW HI 60 MIN: CPT | Performed by: NURSE PRACTITIONER

## 2025-02-20 PROCEDURE — 99195 PHLEBOTOMY: CPT

## 2025-02-20 PROCEDURE — 2580000003 HC RX 258: Performed by: NURSE PRACTITIONER

## 2025-02-20 PROCEDURE — 81002 URINALYSIS NONAUTO W/O SCOPE: CPT | Performed by: NURSE PRACTITIONER

## 2025-02-20 RX ORDER — 0.9 % SODIUM CHLORIDE 0.9 %
1000 INTRAVENOUS SOLUTION INTRAVENOUS ONCE
Status: COMPLETED | OUTPATIENT
Start: 2025-02-20 | End: 2025-02-20

## 2025-02-20 RX ORDER — TESTOSTERONE CYPIONATE 200 MG/ML
INJECTION, SOLUTION INTRAMUSCULAR
COMMUNITY
Start: 2025-01-15 | End: 2025-02-20

## 2025-02-20 RX ORDER — 0.9 % SODIUM CHLORIDE 0.9 %
250 INTRAVENOUS SOLUTION INTRAVENOUS ONCE
OUTPATIENT
Start: 2025-02-20 | End: 2025-02-20

## 2025-02-20 RX ADMIN — SODIUM CHLORIDE 1000 ML: 9 INJECTION, SOLUTION INTRAVENOUS at 14:58

## 2025-02-20 ASSESSMENT — ENCOUNTER SYMPTOMS
NAUSEA: 0
ABDOMINAL PAIN: 0
VOMITING: 0
ABDOMINAL DISTENTION: 0

## 2025-02-20 NOTE — PROGRESS NOTES
Pt arrived to [facility]. After multiple therapeutic phlebotomy sticks that clotted, 500ml of NS administered. After fluids, RN was able to successfully obtain access for therapeutic phlebotomy. 500ml of whole blood was removed.     Electronically signed by Lety Heath RN on 2/20/2025 at 4:16 PM

## 2025-02-20 NOTE — PROGRESS NOTES
Catrachito Coppola is a 54 y.o., male, New patient who presents today   Chief Complaint   Patient presents with    New Patient     I am here today as a new patient or low testosterone.   Patient states he is irritable. Fatigue & experiencing muscle acnes. He also states he is experiencing brain fog.      Patient presents for evaluation of low testosterone.  Patient reports he has been on therapy for a couple of years now.  He is currently being managed by Dr. Lilly with cypionate injections 1.5 mL/300mg weekly.  Symptoms on initial workup include drowsiness, brain fog, low energy, low motivation, irritability.  Patient reports on his initial dose of testosterone, he experienced significant symptom relief, but with every subsequent dose, he had little to no symptom relief.  In reviewing previous labs, he has also had some issues with newly elevated triglycerides and problems with secondary erythrocytosis.  In fact, he has actually been off testosterone therapy for about a month because of this issue.  Patient also reports he has initiated a new diet and has been following this plan for about a week now.  Patient has never undergone sleep study, but does believe he has sleep apnea.  He reports he would not be able to utilize a CPAP, but would be interested in pursuing inspire if he was a candidate.  In regards to other urologic issues, he really has no symptoms/history.  He has no familial history of urologic cancer.      Lab Results   Component Value Date    TESTOSTERONE 749 (H) 10/07/2024    TESTOSTERONE 122 (L) 09/16/2024    TESTOSTERONE >1,500 (H) 08/04/2023        Lab Results   Component Value Date    WBC 11.8 (H) 10/07/2024    HGB 19.9 (A) 02/04/2025    HCT 61.4 (A) 02/04/2025    MCV 91.4 10/07/2024     10/07/2024       Lab Results   Component Value Date    PSA 0.74 06/01/2023         Lab Results   Component Value Date    CHOL 220 (H) 10/07/2024    TRIG 357 (H) 10/07/2024    HDL 37 (L) 10/07/2024    LDL 
rolling walker

## 2025-02-24 ENCOUNTER — TELEPHONE (OUTPATIENT)
Age: 55
End: 2025-02-24

## 2025-02-24 LAB
ESTRADIOL SERPL HS-MCNC: 26.6 PG/ML (ref 10–42)
ESTROGEN SERPL CALC-MCNC: 62.1 PG/ML (ref 19–69)
ESTRONE SERPL-MCNC: 35.5 PG/ML (ref 9–36)

## 2025-02-24 NOTE — TELEPHONE ENCOUNTER
----- Message from Dr. PHILL Lilly DO sent at 2/24/2025  2:56 PM CST -----  Cologuard is normal.  Repeat screening in 3 years

## 2025-02-27 ENCOUNTER — OFFICE VISIT (OUTPATIENT)
Dept: UROLOGY | Age: 55
End: 2025-02-27
Payer: COMMERCIAL

## 2025-02-27 VITALS — BODY MASS INDEX: 37.03 KG/M2 | WEIGHT: 209 LBS | HEIGHT: 63 IN | TEMPERATURE: 98.2 F

## 2025-02-27 DIAGNOSIS — D75.1 SECONDARY ERYTHROCYTOSIS: ICD-10-CM

## 2025-02-27 DIAGNOSIS — G47.30 SLEEP APNEA, UNSPECIFIED TYPE: ICD-10-CM

## 2025-02-27 DIAGNOSIS — R79.89 LOW TESTOSTERONE IN MALE: Primary | ICD-10-CM

## 2025-02-27 DIAGNOSIS — E78.1 HIGH TRIGLYCERIDES: ICD-10-CM

## 2025-02-27 DIAGNOSIS — R79.89 ELEVATED LFTS: ICD-10-CM

## 2025-02-27 LAB
HCT VFR BLD AUTO: 54.6 % (ref 42–52)
HGB BLD-MCNC: 17.5 G/DL (ref 14–18)

## 2025-02-27 PROCEDURE — 99215 OFFICE O/P EST HI 40 MIN: CPT | Performed by: NURSE PRACTITIONER

## 2025-02-27 NOTE — PROGRESS NOTES
Catrachito Coppola is a 54 y.o., male, Established patient who presents today   Chief Complaint   Patient presents with    Follow-up     I am here today for my 1 week follow up.     Patient presents for follow-up of low testosterone.  He had previously been on therapy for a couple of years managed by his primary care provider with cypionate injections 300 mg weekly.  He has been off of therapy for 1 to 2 months now because of secondary erythrocytosis.  Labs were reevaluated.  Testosterone is obviously low.  Blood counts remain elevated, but improved since therapeutic phlebotomy last week.  PSA is within normal limits.  Triglycerides have returned to normal.  Symptoms on initial workup include drowsiness, brain fog, low energy, low motivation, irritability.  The symptoms continue now.    On patient's initial dose of testosterone, he did experience significant symptom relief, but with each dosage afterwards had little response.  Patient has never been tested for sleep apnea, so I did order a home sleep study.  He does not believe he would be able to utilize a CPAP, but would be interested in pursuing inspire.  In regards to other urologic issues, he really has no symptoms/history.  He has no familial history of urologic cancer.     Of note, patient's LFTs are slightly elevated.  Most likely secondary to some over-the-counter medications/dietary changes.  Will redraw these in the next couple of months.  If remain elevated, will be referred to primary care provider for further evaluation.    Lab Results   Component Value Date    TESTOSTERONE 157.2 (L) 02/20/2025    TESTOSTERONE 749 (H) 10/07/2024    TESTOSTERONE 122 (L) 09/16/2024        Lab Results   Component Value Date    WBC 7.5 02/20/2025    HGB 17.5 02/27/2025    HCT 54.6 (H) 02/27/2025    MCV 88.2 02/20/2025     02/20/2025       Lab Results   Component Value Date    PSA 0.61 02/20/2025    PSA 0.74 06/01/2023     REVIEW OF SYSTEMS:  Review of Systems

## 2025-02-28 RX ORDER — 0.9 % SODIUM CHLORIDE 0.9 %
500 INTRAVENOUS SOLUTION INTRAVENOUS ONCE
Status: CANCELLED
Start: 2025-02-28

## 2025-03-03 ENCOUNTER — HOSPITAL ENCOUNTER (OUTPATIENT)
Dept: INFUSION THERAPY | Age: 55
Setting detail: INFUSION SERIES
Discharge: HOME | End: 2025-03-03
Payer: COMMERCIAL

## 2025-03-03 VITALS
RESPIRATION RATE: 16 BRPM | OXYGEN SATURATION: 100 % | SYSTOLIC BLOOD PRESSURE: 120 MMHG | HEART RATE: 78 BPM | TEMPERATURE: 97 F | DIASTOLIC BLOOD PRESSURE: 88 MMHG

## 2025-03-03 DIAGNOSIS — D58.2 ELEVATED HEMOGLOBIN: Primary | ICD-10-CM

## 2025-03-03 DIAGNOSIS — D75.1 SECONDARY ERYTHROCYTOSIS: ICD-10-CM

## 2025-03-03 PROCEDURE — 96360 HYDRATION IV INFUSION INIT: CPT

## 2025-03-03 PROCEDURE — 99195 PHLEBOTOMY: CPT

## 2025-03-03 PROCEDURE — 2580000003 HC RX 258: Performed by: NURSE PRACTITIONER

## 2025-03-03 RX ORDER — 0.9 % SODIUM CHLORIDE 0.9 %
250 INTRAVENOUS SOLUTION INTRAVENOUS ONCE
OUTPATIENT
Start: 2025-03-03 | End: 2025-03-03

## 2025-03-03 RX ORDER — 0.9 % SODIUM CHLORIDE 0.9 %
500 INTRAVENOUS SOLUTION INTRAVENOUS ONCE
Start: 2025-03-03 | End: 2025-03-03

## 2025-03-03 RX ORDER — 0.9 % SODIUM CHLORIDE 0.9 %
500 INTRAVENOUS SOLUTION INTRAVENOUS ONCE
Status: COMPLETED | OUTPATIENT
Start: 2025-03-03 | End: 2025-03-03

## 2025-03-03 RX ADMIN — SODIUM CHLORIDE 500 ML: 9 INJECTION, SOLUTION INTRAVENOUS at 09:44

## 2025-03-03 ASSESSMENT — ENCOUNTER SYMPTOMS
ABDOMINAL DISTENTION: 0
VOMITING: 0
BACK PAIN: 0
NAUSEA: 0
ABDOMINAL PAIN: 0

## 2025-03-03 NOTE — FLOWSHEET NOTE
After first phlebotomy stick- blood flowing so slowly that was dc'd and 1000 cc ivf infused.  Then phelebotomy performed successly .  See flowsheet.  Pt tolerated well.

## 2025-03-11 ENCOUNTER — TELEPHONE (OUTPATIENT)
Dept: UROLOGY | Age: 55
End: 2025-03-11

## 2025-03-11 NOTE — TELEPHONE ENCOUNTER
Attempted to reach patient regarding labs needed prior to appointment Thursday. No answer, left VM letting patient know labs would need to be completed by end of day tomorrow to keep this appointment.

## 2025-03-12 DIAGNOSIS — D75.1 SECONDARY ERYTHROCYTOSIS: ICD-10-CM

## 2025-03-12 DIAGNOSIS — R79.89 ELEVATED LFTS: Primary | ICD-10-CM

## 2025-03-12 LAB
HCT VFR BLD AUTO: 47.7 % (ref 42–52)
HGB BLD-MCNC: 15.5 G/DL (ref 14–18)

## 2025-03-13 ENCOUNTER — OFFICE VISIT (OUTPATIENT)
Dept: UROLOGY | Age: 55
End: 2025-03-13
Payer: COMMERCIAL

## 2025-03-13 VITALS — TEMPERATURE: 98.1 F | WEIGHT: 209.2 LBS | BODY MASS INDEX: 37.07 KG/M2 | HEIGHT: 63 IN

## 2025-03-13 DIAGNOSIS — R79.89 LOW TESTOSTERONE IN MALE: Primary | ICD-10-CM

## 2025-03-13 DIAGNOSIS — D75.1 SECONDARY ERYTHROCYTOSIS: ICD-10-CM

## 2025-03-13 DIAGNOSIS — R79.89 ELEVATED LFTS: ICD-10-CM

## 2025-03-13 LAB
BACTERIA URINE, POC: ABNORMAL
BILIRUBIN URINE: 0 MG/DL
BLOOD, URINE: POSITIVE
CASTS URINE, POC: ABNORMAL
CLARITY, UA: CLEAR
COLOR, UA: YELLOW
CRYSTALS URINE, POC: ABNORMAL
EPI CELLS URINE, POC: ABNORMAL
GLUCOSE URINE: ABNORMAL
KETONES, URINE: NEGATIVE
LEUKOCYTE EST, POC: ABNORMAL
NITRITE, URINE: NEGATIVE
PH UA: 5.5 (ref 4.5–8)
PROTEIN UA: ABNORMAL
RBC URINE, POC: 2
SPECIFIC GRAVITY UA: 1.03 (ref 1–1.03)
UROBILINOGEN, URINE: ABNORMAL
WBC URINE, POC: ABNORMAL
YEAST URINE, POC: ABNORMAL

## 2025-03-13 PROCEDURE — 99214 OFFICE O/P EST MOD 30 MIN: CPT | Performed by: NURSE PRACTITIONER

## 2025-03-13 PROCEDURE — 81001 URINALYSIS AUTO W/SCOPE: CPT | Performed by: NURSE PRACTITIONER

## 2025-03-13 RX ORDER — TESTOSTERONE 40.5 MG/2.5G
2.5 GEL TOPICAL DAILY
Qty: 75 G | Refills: 0 | Status: SHIPPED | OUTPATIENT
Start: 2025-03-13 | End: 2025-04-12

## 2025-03-13 ASSESSMENT — ENCOUNTER SYMPTOMS
ABDOMINAL DISTENTION: 0
VOMITING: 0
ABDOMINAL PAIN: 0
BACK PAIN: 0
NAUSEA: 0

## 2025-03-13 NOTE — PROGRESS NOTES
Catrachito Coppola is a 54 y.o., male, Established patient who presents today   Chief Complaint   Patient presents with    Follow-up     I am here for 4 week follow up  I had my labs done       Patient presents for follow-up of low testosterone.  He had previously been on therapy for a couple of years managed by his primary care provider with cypionate injections 300 mg weekly.  He has been off of therapy for 1 to 2 months now because of secondary erythrocytosis.  Labs were reevaluated.  Testosterone is obviously low.  Blood counts are finally within normal limits after a few rounds of therapeutic phlebotomy.  PSA is within normal limits.  Triglycerides have returned to normal.  Symptoms on initial workup include drowsiness, brain fog, low energy, low motivation, irritability.  The symptoms continue now.     On patient's initial dose of testosterone, he did experience significant symptom relief, but with each dosage afterwards had little response.  Patient has never been tested for sleep apnea, so I did order a home sleep study.  He does not believe he would be able to utilize a CPAP, but would be interested in pursuing inspire.  In regards to other urologic issues, he really has no symptoms/history.  He has no familial history of urologic cancer.      Of note, patient's LFTs are slightly elevated.  Most likely secondary to some over-the-counter medications/dietary changes.  Will redraw these in the next couple of months.  If remain elevated, will be referred to primary care provider for further evaluation.    Lab Results   Component Value Date    TESTOSTERONE 157.2 (L) 02/20/2025    TESTOSTERONE 749 (H) 10/07/2024    TESTOSTERONE 122 (L) 09/16/2024        Lab Results   Component Value Date    WBC 7.5 02/20/2025    HGB 15.5 03/12/2025    HCT 47.7 03/12/2025    MCV 88.2 02/20/2025     02/20/2025       Lab Results   Component Value Date    PSA 0.61 02/20/2025    PSA 0.74 06/01/2023      REVIEW OF SYSTEMS:  Review

## 2025-03-19 ENCOUNTER — TELEPHONE (OUTPATIENT)
Dept: UROLOGY | Age: 55
End: 2025-03-19

## 2025-03-20 ENCOUNTER — TELEPHONE (OUTPATIENT)
Dept: UROLOGY | Age: 55
End: 2025-03-20

## 2025-04-17 DIAGNOSIS — R79.89 ELEVATED LFTS: ICD-10-CM

## 2025-04-17 LAB
ALBUMIN SERPL-MCNC: 4.2 G/DL (ref 3.5–5.2)
ALP SERPL-CCNC: 109 U/L (ref 40–129)
ALT SERPL-CCNC: 53 U/L (ref 10–50)
ANION GAP SERPL CALCULATED.3IONS-SCNC: 14 MMOL/L (ref 8–16)
AST SERPL-CCNC: 40 U/L (ref 10–50)
BASOPHILS # BLD: 0.1 K/UL (ref 0–0.2)
BASOPHILS NFR BLD: 0.9 % (ref 0–1)
BILIRUB SERPL-MCNC: 0.8 MG/DL (ref 0.2–1.2)
BUN SERPL-MCNC: 14 MG/DL (ref 6–20)
CALCIUM SERPL-MCNC: 9.4 MG/DL (ref 8.6–10)
CHLORIDE SERPL-SCNC: 102 MMOL/L (ref 98–107)
CO2 SERPL-SCNC: 25 MMOL/L (ref 22–29)
CREAT SERPL-MCNC: 0.7 MG/DL (ref 0.7–1.2)
EOSINOPHIL # BLD: 0.7 K/UL (ref 0–0.6)
EOSINOPHIL NFR BLD: 7.6 % (ref 0–5)
ERYTHROCYTE [DISTWIDTH] IN BLOOD BY AUTOMATED COUNT: 14.5 % (ref 11.5–14.5)
GLUCOSE SERPL-MCNC: 112 MG/DL (ref 70–99)
HCT VFR BLD AUTO: 49.2 % (ref 42–52)
HGB BLD-MCNC: 16.5 G/DL (ref 14–18)
IMM GRANULOCYTES # BLD: 0.1 K/UL
LYMPHOCYTES # BLD: 2.4 K/UL (ref 1.1–4.5)
LYMPHOCYTES NFR BLD: 24.4 % (ref 20–40)
MCH RBC QN AUTO: 29.8 PG (ref 27–31)
MCHC RBC AUTO-ENTMCNC: 33.5 G/DL (ref 33–37)
MCV RBC AUTO: 88.8 FL (ref 80–94)
MONOCYTES # BLD: 0.8 K/UL (ref 0–0.9)
MONOCYTES NFR BLD: 8.6 % (ref 0–10)
NEUTROPHILS # BLD: 5.6 K/UL (ref 1.5–7.5)
NEUTS SEG NFR BLD: 57.8 % (ref 50–65)
PLATELET # BLD AUTO: 181 K/UL (ref 130–400)
PMV BLD AUTO: 12.4 FL (ref 9.4–12.4)
POTASSIUM SERPL-SCNC: 4.1 MMOL/L (ref 3.5–5.1)
PROT SERPL-MCNC: 7.5 G/DL (ref 6.4–8.3)
RBC # BLD AUTO: 5.54 M/UL (ref 4.7–6.1)
SODIUM SERPL-SCNC: 141 MMOL/L (ref 136–145)
TESTOST SERPL-MCNC: 120 NG/DL (ref 193–740)
WBC # BLD AUTO: 9.7 K/UL (ref 4.8–10.8)

## 2025-04-24 ENCOUNTER — TELEMEDICINE (OUTPATIENT)
Dept: UROLOGY | Age: 55
End: 2025-04-24
Payer: COMMERCIAL

## 2025-04-24 DIAGNOSIS — R79.89 ELEVATED LFTS: ICD-10-CM

## 2025-04-24 DIAGNOSIS — D75.1 SECONDARY ERYTHROCYTOSIS: Primary | ICD-10-CM

## 2025-04-24 DIAGNOSIS — R79.89 LOW TESTOSTERONE IN MALE: ICD-10-CM

## 2025-04-24 PROCEDURE — 99214 OFFICE O/P EST MOD 30 MIN: CPT | Performed by: NURSE PRACTITIONER

## 2025-04-24 RX ORDER — TESTOSTERONE 40.5 MG/2.5G
5 GEL TOPICAL DAILY
Qty: 150 G | Refills: 0 | Status: SHIPPED | OUTPATIENT
Start: 2025-04-24 | End: 2025-05-24

## 2025-04-24 ASSESSMENT — ENCOUNTER SYMPTOMS
ABDOMINAL PAIN: 0
VOMITING: 0
ABDOMINAL DISTENTION: 0
BACK PAIN: 0
NAUSEA: 0

## 2025-04-24 NOTE — ASSESSMENT & PLAN NOTE
Blood counts have remained stable while on therapy.  Will increase therapy and follow labs closely.  Does have standing order for therapeutic phlebotomy if needed.

## 2025-04-24 NOTE — ASSESSMENT & PLAN NOTE
Initiated topical therapy a few weeks ago.  Patient notes a drastic improvement in his symptoms although his levels remain subtherapeutic.  Because his blood counts have also remained stable, I would like to increase him to 2 packets/day to see if this might improve his labs.  Patient is agreeable.  Will plan for virtual visit in about 6 weeks to reevaluate.    Orders:    Testosterone; Future    CBC; Future    PSA, Diagnostic; Future    testosterone (ANDROGEL) 40.5 MG/2.5GM (1.62%) GEL packet; Apply 5 g topically daily for 30 days. Max Daily Amount: 5 g

## 2025-04-24 NOTE — PROGRESS NOTES
Catrachito Coppola, was evaluated through a synchronous (real-time) audio-video encounter. The patient (or guardian if applicable) is aware that this is a billable service, which includes applicable co-pays. This Virtual Visit was conducted with patient's (and/or legal guardian's) consent. Patient identification was verified, and a caregiver was present when appropriate.   The patient was located at Home: [address] [place] [address] [place] [address]  Provider was located at Facility (Appt Dept): [address] [facility], [address]  [place] [address]  Confirm you are appropriately licensed, registered, or certified to deliver care in the state where the patient is located as indicated above. If you are not or unsure, please re-schedule the visit: Yes, I confirm.     Catrachito Coppola (:  1970) is a Established patient, presenting virtually for evaluation of the following:      Below is the assessment and plan developed based on review of pertinent history, physical exam, labs, studies, and medications.     Assessment & Plan  Low testosterone in male  Initiated topical therapy a few weeks ago.  Patient notes a drastic improvement in his symptoms although his levels remain subtherapeutic.  Because his blood counts have also remained stable, I would like to increase him to 2 packets/day to see if this might improve his labs.  Patient is agreeable.  Will plan for virtual visit in about 6 weeks to reevaluate.    Orders:    Testosterone; Future    CBC; Future    PSA, Diagnostic; Future    testosterone (ANDROGEL) 40.5 MG/2.5GM (1.62%) GEL packet; Apply 5 g topically daily for 30 days. Max Daily Amount: 5 g    Secondary erythrocytosis  Blood counts have remained stable while on therapy.  Will increase therapy and follow labs closely.  Does have standing order for therapeutic phlebotomy if needed.         Elevated LFTs  Previously noted to have some elevated LFTs, most likely for some over-the-counter medication.  Will reevaluate.  If

## (undated) DEVICE — SUT SILK 2/0 SH CR8 18IN CR8 C012D

## (undated) DEVICE — ENDOPATH XCEL WITH OPTIVIEW TECHNOLOGY UNIVERSAL TROCAR STABILITY SLEEVES: Brand: ENDOPATH XCEL OPTIVIEW

## (undated) DEVICE — ANTIBACTERIAL UNDYED BRAIDED (POLYGLACTIN 910), SYNTHETIC ABSORBABLE SUTURE: Brand: COATED VICRYL

## (undated) DEVICE — PK TURNOVER RM ADV

## (undated) DEVICE — ADHS LIQ MASTISOL 2/3ML

## (undated) DEVICE — ST TBG PNEUMOCLEAR EVAC SMOKE HIFLO

## (undated) DEVICE — LEGGINGS, PAIR, 33X51 XL, STERILE: Brand: MEDLINE

## (undated) DEVICE — TOTAL TRAY, 16FR 10ML SIL FOLEY, URN: Brand: MEDLINE

## (undated) DEVICE — YANKAUER,BULB TIP WITH VENT: Brand: ARGYLE

## (undated) DEVICE — SPNG LAP PREWSH SFTPK 18X18IN STRL PK/5

## (undated) DEVICE — PAD LAP CHOLE: Brand: MEDLINE INDUSTRIES, INC.

## (undated) DEVICE — MAJOR DOUBLE BASIN W/GOWNS II: Brand: MEDLINE INDUSTRIES, INC.

## (undated) DEVICE — GLV SURG BIOGEL M LTX PF 7 1/2

## (undated) DEVICE — SUT SILK 3/0 SUTUPAK TIES 24IN SA74H

## (undated) DEVICE — CLTH CLENS READYCLEANSE PERI CARE PK/5

## (undated) DEVICE — SUT PDS 1 CTX 36IN VIO PDP371T

## (undated) DEVICE — HARMONIC ACE +7 LAPAROSCOPIC SHEARS ADVANCED HEMOSTASIS 5MM DIAMETER 36CM SHAFT LENGTH  FOR USE WITH GRAY HAND PIECE ONLY: Brand: HARMONIC ACE

## (undated) DEVICE — DRSNG SURESITE WNDW 4X4.5

## (undated) DEVICE — POOLE SUCTION INSTRUMENT WITH REMOVABLE SHEATH: Brand: POOLE

## (undated) DEVICE — SUT SILK 2/0 SUTUPAK TIES 24IN SA75H

## (undated) DEVICE — ENDOPATH PNEUMONEEDLE INSUFFLATION NEEDLES WITH LUER LOCK CONNECTORS 120MM: Brand: ENDOPATH

## (undated) DEVICE — IRRIGATOR BULB ASEPTO 60CC STRL

## (undated) DEVICE — ENDOPATH XCEL WITH OPTIVIEW TECHNOLOGY DILATING TIP TROCARS WITH STABILITY SLEEVES: Brand: ENDOPATH XCEL OPTIVIEW

## (undated) DEVICE — 2, DISPOSABLE SUCTION/IRRIGATOR WITHOUT DISPOSABLE TIP: Brand: STRYKEFLOW

## (undated) DEVICE — TUBING, SUCTION, 1/4" X 12', STRAIGHT: Brand: MEDLINE

## (undated) DEVICE — BARRIER, ABSORBABLE, ADHESION: Brand: SEPRAFILM®

## (undated) DEVICE — MONOPOLAR METZENBAUM SCISSOR, MINI BLADE TIP, DISPOSABLE: Brand: MONOPOLAR METZENBAUM SCISSOR, MINI BLADE TIP, DISPOSABLE

## (undated) DEVICE — SHEET,DRAPE,53X77,STERILE: Brand: MEDLINE

## (undated) DEVICE — SPNG GZ STRL 2S 4X4 12PLY

## (undated) DEVICE — APPL CHLORAPREP HI/LITE 26ML ORNG

## (undated) DEVICE — ELECTRD BLD EZ CLN STD 6.5IN